# Patient Record
Sex: FEMALE | Race: WHITE | NOT HISPANIC OR LATINO | Employment: FULL TIME | URBAN - METROPOLITAN AREA
[De-identification: names, ages, dates, MRNs, and addresses within clinical notes are randomized per-mention and may not be internally consistent; named-entity substitution may affect disease eponyms.]

---

## 2017-10-16 ENCOUNTER — APPOINTMENT (OUTPATIENT)
Dept: WOMENS IMAGING | Facility: HOSPITAL | Age: 42
End: 2017-10-16

## 2017-10-16 PROCEDURE — 77067 SCR MAMMO BI INCL CAD: CPT | Performed by: RADIOLOGY

## 2017-10-16 PROCEDURE — 77063 BREAST TOMOSYNTHESIS BI: CPT | Performed by: RADIOLOGY

## 2017-11-16 ENCOUNTER — CONSULT (OUTPATIENT)
Dept: GASTROENTEROLOGY | Facility: CLINIC | Age: 42
End: 2017-11-16

## 2017-11-16 VITALS
SYSTOLIC BLOOD PRESSURE: 117 MMHG | BODY MASS INDEX: 35.13 KG/M2 | HEART RATE: 92 BPM | HEIGHT: 66 IN | DIASTOLIC BLOOD PRESSURE: 94 MMHG | OXYGEN SATURATION: 98 % | WEIGHT: 218.6 LBS

## 2017-11-16 DIAGNOSIS — R19.7 DIARRHEA, UNSPECIFIED TYPE: ICD-10-CM

## 2017-11-16 DIAGNOSIS — R19.00 ABDOMINAL SWELLING: ICD-10-CM

## 2017-11-16 DIAGNOSIS — Z86.19 HISTORY OF HELICOBACTER PYLORI INFECTION: ICD-10-CM

## 2017-11-16 DIAGNOSIS — R10.30 LOWER ABDOMINAL PAIN: Primary | ICD-10-CM

## 2017-11-16 DIAGNOSIS — R14.3 FLATULENCE: ICD-10-CM

## 2017-11-16 DIAGNOSIS — R11.0 NAUSEA: ICD-10-CM

## 2017-11-16 PROCEDURE — 99244 OFF/OP CNSLTJ NEW/EST MOD 40: CPT | Performed by: PHYSICIAN ASSISTANT

## 2017-11-16 RX ORDER — INSULIN GLARGINE 100 [IU]/ML
INJECTION, SOLUTION SUBCUTANEOUS DAILY
COMMUNITY
End: 2022-09-16 | Stop reason: SDUPTHER

## 2017-11-16 RX ORDER — CITALOPRAM 10 MG/1
10 TABLET ORAL DAILY
COMMUNITY
End: 2018-08-09

## 2017-11-16 RX ORDER — TRAMADOL HYDROCHLORIDE 50 MG/1
50 TABLET ORAL EVERY 12 HOURS
COMMUNITY
End: 2018-11-26

## 2017-11-16 RX ORDER — MONTELUKAST SODIUM 10 MG/1
10 TABLET ORAL NIGHTLY
COMMUNITY
End: 2018-11-26 | Stop reason: ALTCHOICE

## 2017-11-16 RX ORDER — PAROXETINE 10 MG/1
10 TABLET, FILM COATED ORAL EVERY MORNING
COMMUNITY

## 2017-11-16 NOTE — PROGRESS NOTES
Chief Complaint   Patient presents with   • Nausea   • Diarrhea     Marquita Vernon is a 42 y.o. female who presents to the office today at the request of ALIE Carolina for Nausea and Diarrhea.    HPI    The patient was seen for a GI consult due to nausea and diarrhea.  Patient reports that this has been ongoing for the last two years.  She has had an EGD/colonoscopy in 2013 and was diagnosed with H. Pylori and completed the treatment.  Patient denies vomiting and hematochezia but reports lower abdominal pain, dyspepsia, and increased flatulence.  She also reports melena but states that she takes Pepto Bismol frequently.  Patient has a history of pancreatitis.  Patient takes ultram as needed.  Patient has uncontrolled DM type 2.  Medical, surgical, social, and family histories were reviewed and are listed below.      Review of Systems   Constitutional: Positive for chills, fatigue and fever.   HENT: Positive for congestion and sore throat. Negative for trouble swallowing and voice change.    Eyes: Negative for pain and visual disturbance.   Respiratory: Positive for cough, shortness of breath and wheezing.    Cardiovascular: Negative for chest pain, palpitations and leg swelling.   Gastrointestinal: Positive for abdominal distention, abdominal pain, diarrhea, nausea and vomiting. Negative for anal bleeding, blood in stool, constipation and rectal pain.   Endocrine: Negative for cold intolerance and heat intolerance.   Genitourinary: Negative for difficulty urinating and pelvic pain.   Musculoskeletal: Positive for arthralgias, back pain and myalgias.   Skin: Negative for rash and wound.   Allergic/Immunologic: Positive for environmental allergies. Negative for food allergies.   Neurological: Positive for headaches. Negative for dizziness.   Hematological: Bruises/bleeds easily.   Psychiatric/Behavioral: Positive for sleep disturbance. The patient is nervous/anxious.        ACTIVE PROBLEMS:   Specialty Problems   "   None          PAST MEDICAL HISTORY:  Past Medical History:   Diagnosis Date   • Asthma    • Diabetes mellitus    • Scoliosis        SURGICAL HISTORY:  Past Surgical History:   Procedure Laterality Date   • CHOLECYSTECTOMY     • COLONOSCOPY     • CYST REMOVAL     • ENDOSCOPY     • TUBAL ABDOMINAL LIGATION         FAMILY HISTORY:  Family History   Problem Relation Age of Onset   • Diabetes Other    • Cancer Other    • Lung cancer Other        SOCIAL HISTORY:  Social History   Substance Use Topics   • Smoking status: Never Smoker   • Smokeless tobacco: Never Used   • Alcohol use No       CURRENT MEDICATION:    Current Outpatient Prescriptions:   •  Cholecalciferol (VITAMIN D3) 5000 units capsule capsule, Take 5,000 Units by mouth Daily., Disp: , Rfl:   •  citalopram (CeleXA) 10 MG tablet, Take 10 mg by mouth Daily., Disp: , Rfl:   •  insulin aspart (novoLOG FLEXPEN) 100 UNIT/ML solution pen-injector sc pen, Inject  under the skin 3 (Three) Times a Day With Meals., Disp: , Rfl:   •  insulin glargine (LANTUS) 100 UNIT/ML injection, Inject  under the skin Daily., Disp: , Rfl:   •  montelukast (SINGULAIR) 10 MG tablet, Take 10 mg by mouth Every Night., Disp: , Rfl:   •  PARoxetine (PAXIL) 10 MG tablet, Take 10 mg by mouth Every Morning., Disp: , Rfl:   •  traMADol (ULTRAM) 50 MG tablet, Take 50 mg by mouth Every 12 (Twelve) Hours., Disp: , Rfl:     ALLERGIES:  Ibuprofen and Latex    VISIT VITALS:  /94  Pulse 92  Ht 66\" (167.6 cm)  Wt 218 lb 9.6 oz (99.2 kg)  SpO2 98%  BMI 35.28 kg/m2    PHYSICAL EXAMINATION:  Physical Exam   Constitutional: She is oriented to person, place, and time. She appears well-developed and well-nourished. No distress.   HENT:   Head: Normocephalic and atraumatic.   Right Ear: External ear normal.   Left Ear: External ear normal.   Nose: Nose normal.   Mouth/Throat: Oropharynx is clear and moist. No oropharyngeal exudate.   Eyes: Conjunctivae and EOM are normal. Pupils are equal, " round, and reactive to light. Right eye exhibits no discharge. Left eye exhibits no discharge. No scleral icterus.   Neck: Normal range of motion. Neck supple. No JVD present. No tracheal deviation present. No thyromegaly present.   Cardiovascular: Normal rate, regular rhythm, normal heart sounds and intact distal pulses.  Exam reveals no gallop and no friction rub.    No murmur heard.  Pulmonary/Chest: Effort normal and breath sounds normal. No stridor. No respiratory distress. She has no wheezes. She has no rales. She exhibits no tenderness.   Abdominal: Soft. Bowel sounds are normal. She exhibits no distension and no mass. There is tenderness (RLQ). There is no rebound and no guarding. No hernia.   Musculoskeletal: She exhibits no edema, tenderness or deformity.   Lymphadenopathy:     She has no cervical adenopathy.   Neurological: She is alert and oriented to person, place, and time. She has normal reflexes. She displays normal reflexes. No cranial nerve deficit. She exhibits normal muscle tone. Coordination normal.   Skin: Skin is warm and dry. No rash noted. She is not diaphoretic. No erythema. No pallor.   Psychiatric: She has a normal mood and affect. Her behavior is normal. Judgment and thought content normal.       Assessment/Plan      Diagnosis Plan   1. Lower abdominal pain  H. Pylori Breath Test - Breath, Lung    NM gastric emptying   2. Nausea  H. Pylori Breath Test - Breath, Lung    NM gastric emptying   3. Diarrhea, unspecified type  H. Pylori Breath Test - Breath, Lung    Stool Culture Result    Ova & Parasite Result - Stool, Per Rectum    Clostridium Difficile Toxin - Stool, Per Rectum    XR Abdomen KUB   4. Flatulence  H. Pylori Breath Test - Breath, Lung   5. Abdominal swelling  H. Pylori Breath Test - Breath, Lung    NM gastric emptying   6. History of Helicobacter pylori infection  H. Pylori Breath Test - Breath, Lung     The patient will be scheduled for a gastric emptying test due to GI  symptoms in presence of uncontrolled DM.  She will also have an abdominal xray to rule out constipation.  An H. Pylori test will be ordered due to patient's hx of H. Pylori infection.  Stool studies will be collected to rule out infectious cause for diarrhea.  She voiced understanding and agreement of recommendations.    Return in about 4 weeks (around 12/14/2017) for Recheck.           BRITTANIE Briones

## 2017-11-24 ENCOUNTER — APPOINTMENT (OUTPATIENT)
Dept: NUCLEAR MEDICINE | Facility: HOSPITAL | Age: 42
End: 2017-11-24

## 2017-12-02 ENCOUNTER — HOSPITAL ENCOUNTER (OUTPATIENT)
Dept: GENERAL RADIOLOGY | Facility: HOSPITAL | Age: 42
Discharge: HOME | End: 2017-12-02
Admitting: PHYSICIAN ASSISTANT

## 2017-12-02 ENCOUNTER — HOSPITAL ENCOUNTER (OUTPATIENT)
Dept: NUCLEAR MEDICINE | Facility: HOSPITAL | Age: 42
Discharge: HOME | End: 2017-12-02

## 2017-12-02 DIAGNOSIS — R11.0 NAUSEA: ICD-10-CM

## 2017-12-02 DIAGNOSIS — R10.30 LOWER ABDOMINAL PAIN: ICD-10-CM

## 2017-12-02 DIAGNOSIS — R19.00 ABDOMINAL SWELLING: ICD-10-CM

## 2017-12-02 PROCEDURE — 74000 HC ABDOMEN KUB: CPT

## 2017-12-02 PROCEDURE — A9541 TC99M SULFUR COLLOID: HCPCS | Performed by: PHYSICIAN ASSISTANT

## 2017-12-02 PROCEDURE — 78264 GASTRIC EMPTYING IMG STUDY: CPT | Performed by: RADIOLOGY

## 2017-12-02 PROCEDURE — 0 TECHNETIUM SULFUR COLLOID: Performed by: PHYSICIAN ASSISTANT

## 2017-12-02 PROCEDURE — 78264 GASTRIC EMPTYING IMG STUDY: CPT

## 2017-12-02 PROCEDURE — 74000 XR ABDOMEN KUB: CPT | Performed by: RADIOLOGY

## 2017-12-02 RX ADMIN — TECHNETIUM TC 99M SULFUR COLLOID 1 DOSE: KIT at 12:37

## 2017-12-06 ENCOUNTER — TELEPHONE (OUTPATIENT)
Dept: GASTROENTEROLOGY | Facility: CLINIC | Age: 42
End: 2017-12-06

## 2017-12-06 NOTE — TELEPHONE ENCOUNTER
Please call patient and let her know that her gastric emptying scan showed delayed gastric emptying.  Please schedule her to come in to the office to discuss this unless she has an appointment already within the next two weeks.  Thank you.

## 2017-12-19 ENCOUNTER — OFFICE VISIT (OUTPATIENT)
Dept: GASTROENTEROLOGY | Facility: CLINIC | Age: 42
End: 2017-12-19

## 2017-12-19 VITALS
BODY MASS INDEX: 34.84 KG/M2 | HEIGHT: 67 IN | WEIGHT: 222 LBS | HEART RATE: 93 BPM | SYSTOLIC BLOOD PRESSURE: 131 MMHG | OXYGEN SATURATION: 99 % | DIASTOLIC BLOOD PRESSURE: 100 MMHG

## 2017-12-19 DIAGNOSIS — R19.7 DIARRHEA, UNSPECIFIED TYPE: Primary | ICD-10-CM

## 2017-12-19 DIAGNOSIS — R11.0 NAUSEA: ICD-10-CM

## 2017-12-19 PROCEDURE — 99213 OFFICE O/P EST LOW 20 MIN: CPT | Performed by: PHYSICIAN ASSISTANT

## 2017-12-19 RX ORDER — AMOXICILLIN 875 MG/1
875 TABLET, COATED ORAL 2 TIMES DAILY
COMMUNITY
End: 2018-01-31

## 2017-12-19 NOTE — PROGRESS NOTES
Chief Complaint   Patient presents with   • Nausea       Marquita Vernon is a 42 y.o. female who presents to the office today for follow up appointment regarding Nausea.    HPI  She was having diarrhea and nausea. Noticed change since starting antibiotics for sinuses. She has noticed an improvement in her bowel habits since starting the antibiotic. She was afraid to eat due to symptoms and diabetes. She is having 2 stools daily which are more formed now. Nausea has improved as well.     Gastric emptying abnormal in past with noted delayed emptying. Abdominal film was read as normal.    Review of Systems   Constitutional: Positive for chills, fatigue and fever.   HENT: Positive for congestion and sore throat. Negative for trouble swallowing and voice change.    Eyes: Negative for pain and visual disturbance.   Respiratory: Positive for cough, shortness of breath and wheezing.    Cardiovascular: Negative for chest pain, palpitations and leg swelling.   Gastrointestinal: Positive for diarrhea. Negative for abdominal distention, abdominal pain, anal bleeding, blood in stool, constipation, nausea, rectal pain and vomiting.   Endocrine: Negative for cold intolerance and heat intolerance.   Genitourinary: Negative for difficulty urinating and pelvic pain.   Musculoskeletal: Positive for arthralgias, back pain and myalgias.   Skin: Negative for rash and wound.   Allergic/Immunologic: Positive for environmental allergies. Negative for food allergies.   Neurological: Positive for headaches. Negative for dizziness.   Hematological: Bruises/bleeds easily.   Psychiatric/Behavioral: Positive for sleep disturbance. The patient is nervous/anxious.        Past Medical History:   Diagnosis Date   • Asthma    • Diabetes mellitus    • Scoliosis        Past Surgical History:   Procedure Laterality Date   • CHOLECYSTECTOMY     • COLONOSCOPY     • CYST REMOVAL     • ENDOSCOPY     • TUBAL ABDOMINAL LIGATION         Family History   Problem  "Relation Age of Onset   • Diabetes Other    • Cancer Other    • Lung cancer Other        Social History   Substance Use Topics   • Smoking status: Never Smoker   • Smokeless tobacco: Never Used   • Alcohol use No       CURRENT MEDICATION:  •  amoxicillin (AMOXIL) 875 MG tablet, Take 875 mg by mouth 2 (Two) Times a Day., Disp: , Rfl:   •  Cholecalciferol (VITAMIN D3) 5000 units capsule capsule, Take 5,000 Units by mouth Daily., Disp: , Rfl:   •  citalopram (CeleXA) 10 MG tablet, Take 10 mg by mouth Daily., Disp: , Rfl:   •  insulin aspart (novoLOG FLEXPEN) 100 UNIT/ML solution pen-injector sc pen, Inject  under the skin 3 (Three) Times a Day With Meals., Disp: , Rfl:   •  insulin glargine (LANTUS) 100 UNIT/ML injection, Inject  under the skin Daily., Disp: , Rfl:   •  montelukast (SINGULAIR) 10 MG tablet, Take 10 mg by mouth Every Night., Disp: , Rfl:   •  PARoxetine (PAXIL) 10 MG tablet, Take 10 mg by mouth Every Morning., Disp: , Rfl:   •  traMADol (ULTRAM) 50 MG tablet, Take 50 mg by mouth Every 12 (Twelve) Hours., Disp: , Rfl:     ALLERGIES:  Ibuprofen and Latex    VISIT VITALS:  /100  Pulse 93  Ht 170.2 cm (67\")  Wt 101 kg (222 lb)  SpO2 99%  BMI 34.77 kg/m2    Physical Exam   Constitutional: She is oriented to person, place, and time. She appears well-developed and well-nourished. No distress.   HENT:   Head: Normocephalic and atraumatic.   Right Ear: External ear normal.   Left Ear: External ear normal.   Nose: Nose normal.   Mouth/Throat: Oropharynx is clear and moist.   Eyes: Conjunctivae and EOM are normal. Right eye exhibits no discharge. Left eye exhibits no discharge. No scleral icterus.   Neck: Normal range of motion. Neck supple.   Cardiovascular: Normal rate, regular rhythm and normal heart sounds.  Exam reveals no gallop and no friction rub.    No murmur heard.  Pulmonary/Chest: Effort normal and breath sounds normal. No respiratory distress. She has no wheezes. She has no rales. She " exhibits no tenderness.   Abdominal: Soft. Normal appearance and bowel sounds are normal. She exhibits no distension, no ascites and no mass. There is no tenderness. There is no rigidity and no guarding. No hernia.   Musculoskeletal: Normal range of motion. She exhibits no edema or deformity.   Neurological: She is alert and oriented to person, place, and time. She exhibits normal muscle tone. Coordination normal.   Skin: Skin is warm and dry. No rash noted. No erythema. No pallor.   Psychiatric: She has a normal mood and affect. Her behavior is normal. Judgment and thought content normal.   Nursing note and vitals reviewed.      Assessment/Plan     1. Diarrhea, unspecified type    2. Nausea      Her GI complaints have improved. She has been feeling much better. I have asked her to monitor symptoms for recurrence and call our office if needed. She will continue with small portions at meal times and not overeat due to gastroparesis.       Return if symptoms worsen or fail to improve.      Electronically signed 12/22/2017 2:41 PM  Dianna Walters PA-C, [facility]

## 2017-12-29 ENCOUNTER — TELEPHONE (OUTPATIENT)
Dept: GASTROENTEROLOGY | Facility: CLINIC | Age: 42
End: 2017-12-29

## 2018-01-03 NOTE — TELEPHONE ENCOUNTER
If she has the flu, her symptoms are likely related to that. She should schedule appt for a few weeks out after flu has resolved to discuss complaints.

## 2018-01-03 NOTE — TELEPHONE ENCOUNTER
Patient stated that after she stopped taking antibiotics, diarrhea and stomach cramps came back. She currently has the flu. Is there anything that you could call in for her so she doesn't have to come in? Thank you.

## 2018-01-04 NOTE — TELEPHONE ENCOUNTER
Spoke with patient and let her know that her symptoms are likely related to the flu. I made her an appointment on 01/31/2018 to come in and discuss her complaints.

## 2018-01-31 ENCOUNTER — OFFICE VISIT (OUTPATIENT)
Dept: GASTROENTEROLOGY | Facility: CLINIC | Age: 43
End: 2018-01-31

## 2018-01-31 ENCOUNTER — LAB (OUTPATIENT)
Dept: LAB | Facility: HOSPITAL | Age: 43
End: 2018-01-31

## 2018-01-31 VITALS
SYSTOLIC BLOOD PRESSURE: 112 MMHG | BODY MASS INDEX: 35.75 KG/M2 | OXYGEN SATURATION: 98 % | HEIGHT: 67 IN | WEIGHT: 227.8 LBS | HEART RATE: 92 BPM | DIASTOLIC BLOOD PRESSURE: 82 MMHG

## 2018-01-31 DIAGNOSIS — R10.9 ABDOMINAL CRAMPING: ICD-10-CM

## 2018-01-31 DIAGNOSIS — A09 TRAVELER'S DIARRHEA: ICD-10-CM

## 2018-01-31 DIAGNOSIS — R15.2 FECAL URGENCY: ICD-10-CM

## 2018-01-31 DIAGNOSIS — R19.7 DIARRHEA, UNSPECIFIED TYPE: ICD-10-CM

## 2018-01-31 DIAGNOSIS — R19.7 DIARRHEA, UNSPECIFIED TYPE: Primary | ICD-10-CM

## 2018-01-31 LAB
027 TOXIN: NORMAL
ALBUMIN SERPL-MCNC: 4.4 G/DL (ref 3.5–5)
ALBUMIN/GLOB SERPL: 1.5 G/DL (ref 1.5–2.5)
ALP SERPL-CCNC: 86 U/L (ref 35–104)
ALT SERPL W P-5'-P-CCNC: 22 U/L (ref 10–36)
AMYLASE SERPL-CCNC: 41 U/L (ref 28–100)
ANION GAP SERPL CALCULATED.3IONS-SCNC: 6.9 MMOL/L (ref 3.6–11.2)
AST SERPL-CCNC: 20 U/L (ref 10–30)
BASOPHILS # BLD AUTO: 0.05 10*3/MM3 (ref 0–0.3)
BASOPHILS NFR BLD AUTO: 0.6 % (ref 0–2)
BILIRUB SERPL-MCNC: 0.4 MG/DL (ref 0.2–1.8)
BUN BLD-MCNC: 18 MG/DL (ref 7–21)
BUN/CREAT SERPL: 20.9 (ref 7–25)
C DIFF TOX GENS STL QL NAA+PROBE: NEGATIVE
CALCIUM SPEC-SCNC: 9.2 MG/DL (ref 7.7–10)
CHLORIDE SERPL-SCNC: 107 MMOL/L (ref 99–112)
CO2 SERPL-SCNC: 24.1 MMOL/L (ref 24.3–31.9)
CREAT BLD-MCNC: 0.86 MG/DL (ref 0.43–1.29)
CRP SERPL-MCNC: <0.5 MG/DL (ref 0–0.99)
DEPRECATED RDW RBC AUTO: 41.8 FL (ref 37–54)
EOSINOPHIL # BLD AUTO: 0.36 10*3/MM3 (ref 0–0.7)
EOSINOPHIL NFR BLD AUTO: 4.2 % (ref 0–5)
ERYTHROCYTE [DISTWIDTH] IN BLOOD BY AUTOMATED COUNT: 13 % (ref 11.5–14.5)
GFR SERPL CREATININE-BSD FRML MDRD: 72 ML/MIN/1.73
GLOBULIN UR ELPH-MCNC: 2.9 GM/DL
GLUCOSE BLD-MCNC: 277 MG/DL (ref 70–110)
HCT VFR BLD AUTO: 41.6 % (ref 37–47)
HGB BLD-MCNC: 13.6 G/DL (ref 12–16)
IMM GRANULOCYTES # BLD: 0.02 10*3/MM3 (ref 0–0.03)
IMM GRANULOCYTES NFR BLD: 0.2 % (ref 0–0.5)
LACTOFERRIN STL QL LA: NEGATIVE
LIPASE SERPL-CCNC: 41 U/L (ref 13–60)
LYMPHOCYTES # BLD AUTO: 2.21 10*3/MM3 (ref 1–3)
LYMPHOCYTES NFR BLD AUTO: 25.6 % (ref 21–51)
MCH RBC QN AUTO: 29.5 PG (ref 27–33)
MCHC RBC AUTO-ENTMCNC: 32.7 G/DL (ref 33–37)
MCV RBC AUTO: 90.2 FL (ref 80–94)
MONOCYTES # BLD AUTO: 0.61 10*3/MM3 (ref 0.1–0.9)
MONOCYTES NFR BLD AUTO: 7.1 % (ref 0–10)
NEUTROPHILS # BLD AUTO: 5.39 10*3/MM3 (ref 1.4–6.5)
NEUTROPHILS NFR BLD AUTO: 62.3 % (ref 30–70)
OSMOLALITY SERPL CALC.SUM OF ELEC: 287.5 MOSM/KG (ref 273–305)
PLATELET # BLD AUTO: 253 10*3/MM3 (ref 130–400)
PMV BLD AUTO: 12.7 FL (ref 6–10)
POTASSIUM BLD-SCNC: 4.9 MMOL/L (ref 3.5–5.3)
PROT SERPL-MCNC: 7.3 G/DL (ref 6–8)
RBC # BLD AUTO: 4.61 10*6/MM3 (ref 4.2–5.4)
SODIUM BLD-SCNC: 138 MMOL/L (ref 135–153)
WBC NRBC COR # BLD: 8.64 10*3/MM3 (ref 4.5–12.5)

## 2018-01-31 PROCEDURE — 87045 FECES CULTURE AEROBIC BACT: CPT

## 2018-01-31 PROCEDURE — 36415 COLL VENOUS BLD VENIPUNCTURE: CPT

## 2018-01-31 PROCEDURE — 80053 COMPREHEN METABOLIC PANEL: CPT

## 2018-01-31 PROCEDURE — 85025 COMPLETE CBC W/AUTO DIFF WBC: CPT

## 2018-01-31 PROCEDURE — 87046 STOOL CULTR AEROBIC BACT EA: CPT

## 2018-01-31 PROCEDURE — 87328 CRYPTOSPORIDIUM AG IA: CPT

## 2018-01-31 PROCEDURE — 86140 C-REACTIVE PROTEIN: CPT

## 2018-01-31 PROCEDURE — 87177 OVA AND PARASITES SMEARS: CPT

## 2018-01-31 PROCEDURE — 82150 ASSAY OF AMYLASE: CPT

## 2018-01-31 PROCEDURE — 87493 C DIFF AMPLIFIED PROBE: CPT

## 2018-01-31 PROCEDURE — 82656 EL-1 FECAL QUAL/SEMIQ: CPT

## 2018-01-31 PROCEDURE — 87209 SMEAR COMPLEX STAIN: CPT

## 2018-01-31 PROCEDURE — 83013 H PYLORI (C-13) BREATH: CPT

## 2018-01-31 PROCEDURE — 83631 LACTOFERRIN FECAL (QUANT): CPT

## 2018-01-31 PROCEDURE — 99214 OFFICE O/P EST MOD 30 MIN: CPT | Performed by: PHYSICIAN ASSISTANT

## 2018-01-31 PROCEDURE — 87329 GIARDIA AG IA: CPT

## 2018-01-31 PROCEDURE — 83690 ASSAY OF LIPASE: CPT

## 2018-01-31 RX ORDER — NITAZOXANIDE 500 MG/1
500 TABLET ORAL 2 TIMES DAILY WITH MEALS
Qty: 20 TABLET | Refills: 0 | Status: SHIPPED | OUTPATIENT
Start: 2018-01-31 | End: 2018-02-09

## 2018-01-31 NOTE — PROGRESS NOTES
: 1975    Chief Complaint   Patient presents with   • Diarrhea       Marquita Vernon is a 42 y.o. female who presents to the office today as a follow up appointment regarding Diarrhea.    History of Present Illness:  Since the beginning of 2018, she developed severe abdominal gas pains and it has now gotten to the point that it is keeping her up at night. Abdominal pain is generalized and described as cramping, severe and intermittent. It will last for long periods of time  She has diarrhea up to 8 times per day. Fecal urgency is present without incontinence. She has been taking probiotics which seem to help some. Denies nausea currently. Diarrhea has been intermittent for the past 2 years, she did have improvement in symptoms after taking an antibiotic for a sinus infection around the time of her last office visit.     EGD/colonoscopy was performed in  and she was diagnosed with H. Pylori and completed the treatment at that time. There is also history of pancreatitis.  Takes ultram as needed and has uncontrolled DM type 2.      Abdominal film 2017:  IMPRESSION:  Nonobstructive bowel gas pattern.    Review of Systems   Constitutional: Positive for chills, fatigue and fever.   HENT: Positive for congestion and sore throat. Negative for trouble swallowing and voice change.    Eyes: Negative for pain and visual disturbance.   Respiratory: Positive for cough, shortness of breath and wheezing.    Cardiovascular: Negative for chest pain, palpitations and leg swelling.   Gastrointestinal: Positive for diarrhea. Negative for abdominal distention, abdominal pain, anal bleeding, blood in stool, constipation, nausea, rectal pain and vomiting.   Endocrine: Negative for cold intolerance and heat intolerance.   Genitourinary: Negative for difficulty urinating and pelvic pain.   Musculoskeletal: Positive for arthralgias, back pain and myalgias.   Skin: Negative for rash and wound.   Allergic/Immunologic: Positive  "for environmental allergies. Negative for food allergies.   Neurological: Positive for headaches. Negative for dizziness.   Hematological: Bruises/bleeds easily.   Psychiatric/Behavioral: Positive for sleep disturbance. The patient is nervous/anxious.        Past Medical History:   Diagnosis Date   • Asthma    • Diabetes mellitus    • Scoliosis        Past Surgical History:   Procedure Laterality Date   • CHOLECYSTECTOMY     • COLONOSCOPY     • CYST REMOVAL     • ENDOSCOPY     • TUBAL ABDOMINAL LIGATION         Family History   Problem Relation Age of Onset   • Diabetes Other    • Cancer Other    • Lung cancer Other        Social History     Social History   • Marital status: Unknown     Spouse name: N/A   • Number of children: N/A   • Years of education: N/A     Social History Main Topics   • Smoking status: Never Smoker   • Smokeless tobacco: Never Used   • Alcohol use No   • Drug use: Not on file   • Sexual activity: Not on file     Other Topics Concern   • Not on file     Social History Narrative   • No narrative on file       Current Outpatient Prescriptions:   •  Cholecalciferol (VITAMIN D3) 5000 units capsule capsule, Take 5,000 Units by mouth Daily., Disp: , Rfl:   •  citalopram (CeleXA) 10 MG tablet, Take 10 mg by mouth Daily., Disp: , Rfl:   •  insulin aspart (novoLOG FLEXPEN) 100 UNIT/ML solution pen-injector sc pen, Inject  under the skin 3 (Three) Times a Day With Meals., Disp: , Rfl:   •  insulin glargine (LANTUS) 100 UNIT/ML injection, Inject  under the skin Daily., Disp: , Rfl:   •  montelukast (SINGULAIR) 10 MG tablet, Take 10 mg by mouth Every Night., Disp: , Rfl:   •  PARoxetine (PAXIL) 10 MG tablet, Take 10 mg by mouth Every Morning., Disp: , Rfl:   •  traMADol (ULTRAM) 50 MG tablet, Take 50 mg by mouth Every 12 (Twelve) Hours., Disp: , Rfl:     Allergies:   Ibuprofen and Latex    Vitals:  /82  Pulse 92  Ht 170.2 cm (67\")  Wt 103 kg (227 lb 12.8 oz)  SpO2 98%  BMI 35.68 " kg/m2    Physical Exam   Constitutional: She is oriented to person, place, and time. She appears well-developed and well-nourished. No distress.   HENT:   Head: Normocephalic and atraumatic.   Right Ear: External ear normal.   Left Ear: External ear normal.   Nose: Nose normal.   Mouth/Throat: Oropharynx is clear and moist.   Eyes: Conjunctivae and EOM are normal. Right eye exhibits no discharge. Left eye exhibits no discharge. No scleral icterus.   Neck: Normal range of motion. Neck supple.   Cardiovascular: Normal rate, regular rhythm and normal heart sounds.  Exam reveals no gallop and no friction rub.    No murmur heard.  Pulmonary/Chest: Effort normal and breath sounds normal. No respiratory distress. She has no wheezes. She has no rales. She exhibits no tenderness.   Abdominal: Soft. Normal appearance and bowel sounds are normal. She exhibits no distension, no ascites and no mass. There is tenderness (generalized, mild). There is no rigidity and no guarding. No hernia.   Musculoskeletal: Normal range of motion. She exhibits no edema or deformity.   Neurological: She is alert and oriented to person, place, and time. She exhibits normal muscle tone. Coordination normal.   Skin: Skin is warm and dry. No rash noted. No erythema. No pallor.   Psychiatric: She has a normal mood and affect. Her behavior is normal. Judgment and thought content normal.   Nursing note and vitals reviewed.      Assessment/Plan:  1. Diarrhea, unspecified type    2. Abdominal cramping    3. Fecal urgency      Orders Placed This Encounter   Procedures   • Clostridium Difficile Toxin, PCR - Stool, Per Rectum   • Giardia / Cryptosporidium Screen - Stool, Per Rectum   • Stool Culture - Stool, Per Rectum   • Ova & Parasite Examination - Stool, Per Rectum   • H. Pylori Breath Test - , Lung   • Fecal Lactoferrin - Stool, Per Rectum   • Pancreatic Elastase, Fecal - Stool, Per Rectum   • Amylase   • Comprehensive Metabolic Panel   • C-reactive  Protein   • Lipase   • CBC & Differential       New Medications Ordered This Visit   Medications   • nitazoxanide (ALINIA) 500 MG tablet     Sig: Take 1 tablet by mouth 2 (Two) Times a Day With Meals for 10 days.     Dispense:  20 tablet     Refill:  0     She will have aforementioned tests for further evaluation of her complaints. I have recommended that after stool testing, she go ahead and start treatment with Alinia for diarrhea. Previous abdominal film was reviewed and completely normal. She can also continue Probiotics daily plus Simethicone (Gas-x OTC) as needed.         Return in about 3 weeks (around 2/21/2018) for recheck diarrhea.      Electronically signed 1/31/2018 11:02 AM  Dianna Walters PA-C, [facility]

## 2018-02-02 LAB
BACTERIA SPEC AEROBE CULT: NORMAL
CRYPTOSP AG STL QL IA: NEGATIVE
G LAMBLIA AG STL QL IA: NEGATIVE

## 2018-02-03 LAB
O+P SPEC MICRO: NORMAL
OVA + PARASITE RESULT 1: NORMAL
UREA BREATH TEST QL: NORMAL
UREA BREATH TEST QL: POSITIVE

## 2018-02-05 LAB — ELASTASE PANC STL-MCNT: >500 UG ELAST./G

## 2018-02-06 ENCOUNTER — TELEPHONE (OUTPATIENT)
Dept: GASTROENTEROLOGY | Facility: CLINIC | Age: 43
End: 2018-02-06

## 2018-02-06 NOTE — TELEPHONE ENCOUNTER
Patient states Alinia 500mg bid  Is not covered on [employer]. She wants you to try and p-a

## 2018-02-07 ENCOUNTER — TELEPHONE (OUTPATIENT)
Dept: GASTROENTEROLOGY | Facility: CLINIC | Age: 43
End: 2018-02-07

## 2018-02-07 ENCOUNTER — DOCUMENTATION (OUTPATIENT)
Dept: GASTROENTEROLOGY | Facility: CLINIC | Age: 43
End: 2018-02-07

## 2018-02-07 DIAGNOSIS — R19.7 DIARRHEA, UNSPECIFIED TYPE: Primary | ICD-10-CM

## 2018-02-09 RX ORDER — METRONIDAZOLE 500 MG/1
500 TABLET ORAL 3 TIMES DAILY
Qty: 30 TABLET | Refills: 0 | Status: SHIPPED | OUTPATIENT
Start: 2018-02-09 | End: 2018-02-22

## 2018-02-09 NOTE — TELEPHONE ENCOUNTER
Spoke with patient and let her know her insurance denied Alinia. I let her know that she has Flagyl at her pharmacy and told her to not drink alcohol, use mouth wash or cough syrup while taking the medicine. She voiced understanding.

## 2018-02-09 NOTE — TELEPHONE ENCOUNTER
I will send Flagyl instead. Please let her know and she should not drink alcohol, use mouth wash or cough syrup while taking this medication. Stop probiotic while taking it then resume after therapy.

## 2018-02-22 ENCOUNTER — OFFICE VISIT (OUTPATIENT)
Dept: GASTROENTEROLOGY | Facility: CLINIC | Age: 43
End: 2018-02-22

## 2018-02-22 VITALS
OXYGEN SATURATION: 99 % | HEART RATE: 99 BPM | SYSTOLIC BLOOD PRESSURE: 115 MMHG | BODY MASS INDEX: 36.26 KG/M2 | WEIGHT: 231 LBS | DIASTOLIC BLOOD PRESSURE: 88 MMHG | HEIGHT: 67 IN

## 2018-02-22 DIAGNOSIS — A04.8 H. PYLORI INFECTION: Primary | ICD-10-CM

## 2018-02-22 DIAGNOSIS — R10.84 ABDOMINAL PAIN, GENERALIZED: ICD-10-CM

## 2018-02-22 PROCEDURE — 99214 OFFICE O/P EST MOD 30 MIN: CPT | Performed by: PHYSICIAN ASSISTANT

## 2018-02-22 RX ORDER — OMEPRAZOLE 20 MG/1
20 CAPSULE, DELAYED RELEASE ORAL AS NEEDED
COMMUNITY
End: 2018-02-22 | Stop reason: SDUPTHER

## 2018-02-22 RX ORDER — OMEPRAZOLE 20 MG/1
20 CAPSULE, DELAYED RELEASE ORAL
Qty: 20 CAPSULE | Refills: 0 | Status: SHIPPED | OUTPATIENT
Start: 2018-02-22

## 2018-04-19 ENCOUNTER — RESULTS ENCOUNTER (OUTPATIENT)
Dept: GASTROENTEROLOGY | Facility: CLINIC | Age: 43
End: 2018-04-19

## 2018-04-19 DIAGNOSIS — A04.8 H. PYLORI INFECTION: ICD-10-CM

## 2018-08-09 ENCOUNTER — APPOINTMENT (OUTPATIENT)
Dept: GENERAL RADIOLOGY | Facility: HOSPITAL | Age: 43
End: 2018-08-09

## 2018-08-09 ENCOUNTER — HOSPITAL ENCOUNTER (EMERGENCY)
Facility: HOSPITAL | Age: 43
Discharge: HOME | End: 2018-08-09
Attending: EMERGENCY MEDICINE | Admitting: EMERGENCY MEDICINE

## 2018-08-09 ENCOUNTER — APPOINTMENT (OUTPATIENT)
Dept: CT IMAGING | Facility: HOSPITAL | Age: 43
End: 2018-08-09

## 2018-08-09 VITALS
OXYGEN SATURATION: 100 % | BODY MASS INDEX: 37.28 KG/M2 | SYSTOLIC BLOOD PRESSURE: 109 MMHG | WEIGHT: 232 LBS | DIASTOLIC BLOOD PRESSURE: 66 MMHG | RESPIRATION RATE: 18 BRPM | TEMPERATURE: 98.6 F | HEART RATE: 72 BPM | HEIGHT: 66 IN

## 2018-08-09 DIAGNOSIS — R10.31 RIGHT LOWER QUADRANT ABDOMINAL PAIN: ICD-10-CM

## 2018-08-09 DIAGNOSIS — E11.65 TYPE 2 DIABETES MELLITUS WITH HYPERGLYCEMIA, WITH LONG-TERM CURRENT USE OF INSULIN (HCC): Primary | ICD-10-CM

## 2018-08-09 DIAGNOSIS — Z79.4 TYPE 2 DIABETES MELLITUS WITH HYPERGLYCEMIA, WITH LONG-TERM CURRENT USE OF INSULIN (HCC): Primary | ICD-10-CM

## 2018-08-09 LAB
ACETONE BLD QL: ABNORMAL
ALBUMIN SERPL-MCNC: 4.1 G/DL (ref 3.5–5)
ALBUMIN/GLOB SERPL: 1.4 G/DL (ref 1.5–2.5)
ALP SERPL-CCNC: 76 U/L (ref 35–104)
ALT SERPL W P-5'-P-CCNC: 25 U/L (ref 10–36)
AMYLASE SERPL-CCNC: 18 U/L (ref 28–100)
ANION GAP SERPL CALCULATED.3IONS-SCNC: 7.1 MMOL/L (ref 3.6–11.2)
AST SERPL-CCNC: 53 U/L (ref 10–30)
B-HCG UR QL: NEGATIVE
BASOPHILS # BLD AUTO: 0.03 10*3/MM3 (ref 0–0.3)
BASOPHILS NFR BLD AUTO: 0.4 % (ref 0–2)
BILIRUB SERPL-MCNC: 0.7 MG/DL (ref 0.2–1.8)
BILIRUB UR QL STRIP: NEGATIVE
BUN BLD-MCNC: 14 MG/DL (ref 7–21)
BUN/CREAT SERPL: 14.4 (ref 7–25)
CALCIUM SPEC-SCNC: 9.3 MG/DL (ref 7.7–10)
CHLORIDE SERPL-SCNC: 102 MMOL/L (ref 99–112)
CLARITY UR: CLEAR
CO2 SERPL-SCNC: 22.9 MMOL/L (ref 24.3–31.9)
COLOR UR: YELLOW
CREAT BLD-MCNC: 0.97 MG/DL (ref 0.43–1.29)
CRP SERPL-MCNC: 1.42 MG/DL (ref 0–0.99)
DEPRECATED RDW RBC AUTO: 39.1 FL (ref 37–54)
EOSINOPHIL # BLD AUTO: 0.25 10*3/MM3 (ref 0–0.7)
EOSINOPHIL NFR BLD AUTO: 3.7 % (ref 0–5)
ERYTHROCYTE [DISTWIDTH] IN BLOOD BY AUTOMATED COUNT: 12.5 % (ref 11.5–14.5)
GFR SERPL CREATININE-BSD FRML MDRD: 63 ML/MIN/1.73
GLOBULIN UR ELPH-MCNC: 2.9 GM/DL
GLUCOSE BLD-MCNC: 425 MG/DL (ref 70–110)
GLUCOSE BLDC GLUCOMTR-MCNC: 325 MG/DL (ref 70–130)
GLUCOSE BLDC GLUCOMTR-MCNC: 403 MG/DL (ref 70–130)
GLUCOSE UR STRIP-MCNC: ABNORMAL MG/DL
HCT VFR BLD AUTO: 38.3 % (ref 37–47)
HGB BLD-MCNC: 12.8 G/DL (ref 12–16)
HGB UR QL STRIP.AUTO: NEGATIVE
IMM GRANULOCYTES # BLD: 0.02 10*3/MM3 (ref 0–0.03)
IMM GRANULOCYTES NFR BLD: 0.3 % (ref 0–0.5)
KETONES UR QL STRIP: ABNORMAL
LEUKOCYTE ESTERASE UR QL STRIP.AUTO: NEGATIVE
LIPASE SERPL-CCNC: 34 U/L (ref 13–60)
LYMPHOCYTES # BLD AUTO: 1.61 10*3/MM3 (ref 1–3)
LYMPHOCYTES NFR BLD AUTO: 23.8 % (ref 21–51)
MCH RBC QN AUTO: 29.4 PG (ref 27–33)
MCHC RBC AUTO-ENTMCNC: 33.4 G/DL (ref 33–37)
MCV RBC AUTO: 88 FL (ref 80–94)
MONOCYTES # BLD AUTO: 0.37 10*3/MM3 (ref 0.1–0.9)
MONOCYTES NFR BLD AUTO: 5.5 % (ref 0–10)
NEUTROPHILS # BLD AUTO: 4.48 10*3/MM3 (ref 1.4–6.5)
NEUTROPHILS NFR BLD AUTO: 66.3 % (ref 30–70)
NITRITE UR QL STRIP: NEGATIVE
OSMOLALITY SERPL CALC.SUM OF ELEC: 283.1 MOSM/KG (ref 273–305)
PH UR STRIP.AUTO: <=5 [PH] (ref 5–8)
PLATELET # BLD AUTO: 183 10*3/MM3 (ref 130–400)
PMV BLD AUTO: 12.9 FL (ref 6–10)
POTASSIUM BLD-SCNC: 4.4 MMOL/L (ref 3.5–5.3)
POTASSIUM BLD-SCNC: 6.1 MMOL/L (ref 3.5–5.3)
PROT SERPL-MCNC: 7 G/DL (ref 6–8)
PROT UR QL STRIP: NEGATIVE
RBC # BLD AUTO: 4.35 10*6/MM3 (ref 4.2–5.4)
SODIUM BLD-SCNC: 132 MMOL/L (ref 135–153)
SP GR UR STRIP: >1.03 (ref 1–1.03)
TROPONIN I SERPL-MCNC: <0.006 NG/ML
UROBILINOGEN UR QL STRIP: ABNORMAL
WBC NRBC COR # BLD: 6.76 10*3/MM3 (ref 4.5–12.5)

## 2018-08-09 PROCEDURE — 86140 C-REACTIVE PROTEIN: CPT | Performed by: PHYSICIAN ASSISTANT

## 2018-08-09 PROCEDURE — 99285 EMERGENCY DEPT VISIT HI MDM: CPT

## 2018-08-09 PROCEDURE — 74022 RADEX COMPL AQT ABD SERIES: CPT

## 2018-08-09 PROCEDURE — 82150 ASSAY OF AMYLASE: CPT | Performed by: PHYSICIAN ASSISTANT

## 2018-08-09 PROCEDURE — 25010000002 IOPAMIDOL 61 % SOLUTION: Performed by: EMERGENCY MEDICINE

## 2018-08-09 PROCEDURE — 83690 ASSAY OF LIPASE: CPT | Performed by: PHYSICIAN ASSISTANT

## 2018-08-09 PROCEDURE — 25010000002 ONDANSETRON PER 1 MG: Performed by: PHYSICIAN ASSISTANT

## 2018-08-09 PROCEDURE — 84132 ASSAY OF SERUM POTASSIUM: CPT | Performed by: PHYSICIAN ASSISTANT

## 2018-08-09 PROCEDURE — 74177 CT ABD & PELVIS W/CONTRAST: CPT | Performed by: RADIOLOGY

## 2018-08-09 PROCEDURE — 93005 ELECTROCARDIOGRAM TRACING: CPT | Performed by: PHYSICIAN ASSISTANT

## 2018-08-09 PROCEDURE — 85025 COMPLETE CBC W/AUTO DIFF WBC: CPT | Performed by: PHYSICIAN ASSISTANT

## 2018-08-09 PROCEDURE — 84484 ASSAY OF TROPONIN QUANT: CPT | Performed by: PHYSICIAN ASSISTANT

## 2018-08-09 PROCEDURE — 96374 THER/PROPH/DIAG INJ IV PUSH: CPT

## 2018-08-09 PROCEDURE — 80053 COMPREHEN METABOLIC PANEL: CPT | Performed by: PHYSICIAN ASSISTANT

## 2018-08-09 PROCEDURE — 74022 RADEX COMPL AQT ABD SERIES: CPT | Performed by: RADIOLOGY

## 2018-08-09 PROCEDURE — 82962 GLUCOSE BLOOD TEST: CPT

## 2018-08-09 PROCEDURE — 63710000001 INSULIN REGULAR HUMAN PER 5 UNITS: Performed by: PHYSICIAN ASSISTANT

## 2018-08-09 PROCEDURE — 96361 HYDRATE IV INFUSION ADD-ON: CPT

## 2018-08-09 PROCEDURE — 25010000002 MORPHINE PER 10 MG: Performed by: EMERGENCY MEDICINE

## 2018-08-09 PROCEDURE — 81025 URINE PREGNANCY TEST: CPT | Performed by: PHYSICIAN ASSISTANT

## 2018-08-09 PROCEDURE — 93010 ELECTROCARDIOGRAM REPORT: CPT | Performed by: INTERNAL MEDICINE

## 2018-08-09 PROCEDURE — 96375 TX/PRO/DX INJ NEW DRUG ADDON: CPT

## 2018-08-09 PROCEDURE — 74177 CT ABD & PELVIS W/CONTRAST: CPT

## 2018-08-09 PROCEDURE — 82009 KETONE BODYS QUAL: CPT | Performed by: PHYSICIAN ASSISTANT

## 2018-08-09 PROCEDURE — 81003 URINALYSIS AUTO W/O SCOPE: CPT | Performed by: PHYSICIAN ASSISTANT

## 2018-08-09 RX ORDER — SODIUM CHLORIDE 0.9 % (FLUSH) 0.9 %
10 SYRINGE (ML) INJECTION AS NEEDED
Status: DISCONTINUED | OUTPATIENT
Start: 2018-08-09 | End: 2018-08-09 | Stop reason: HOSPADM

## 2018-08-09 RX ORDER — ONDANSETRON 2 MG/ML
4 INJECTION INTRAMUSCULAR; INTRAVENOUS ONCE
Status: COMPLETED | OUTPATIENT
Start: 2018-08-09 | End: 2018-08-09

## 2018-08-09 RX ADMIN — ONDANSETRON HYDROCHLORIDE 4 MG: 2 INJECTION, SOLUTION INTRAMUSCULAR; INTRAVENOUS at 10:20

## 2018-08-09 RX ADMIN — SODIUM CHLORIDE 1000 ML: 9 INJECTION, SOLUTION INTRAVENOUS at 11:19

## 2018-08-09 RX ADMIN — SODIUM CHLORIDE 1000 ML: 9 INJECTION, SOLUTION INTRAVENOUS at 10:20

## 2018-08-09 RX ADMIN — IOPAMIDOL 100 ML: 612 INJECTION, SOLUTION INTRAVENOUS at 11:45

## 2018-08-09 RX ADMIN — MORPHINE SULFATE 4 MG: 4 INJECTION, SOLUTION INTRAMUSCULAR; INTRAVENOUS at 10:25

## 2018-08-09 RX ADMIN — HUMAN INSULIN 15 UNITS: 100 INJECTION, SOLUTION SUBCUTANEOUS at 10:26

## 2018-08-09 NOTE — ED PROVIDER NOTES
Subjective   43-year-old female presents for right lower quadrant abdominal pain.  She states this is been going on intermittently for the last 5 days.  She states the pain does not radiate.  She states it has gotten worse over the last 2 days.  She describes it as a dull and throbbing pain.  She states she has had nausea.  She had one episode of vomiting yesterday.  She states she always has diarrhea due to irritable bowel syndrome.  She states her pain is better if she lays on her right side and is worse if she tries to eat.  She states she has had this pain in the past.  She has tried Tums and Pepto-Bismol at home with no relief.  She states her doctor sent her to [facility] yesterday because her pancreatic enzymes were elevated.  She states they talked about admitting her but there were no beds so they decided to send her home.  She states she has not eaten today.  She is also not taken her insulin today.  She states she has had a decreased appetite.  She reports her pain is currently an 8 out of 10.        History provided by:  Patient  Abdominal Pain   Pain location:  RLQ  Pain quality: dull and throbbing    Pain radiates to:  Does not radiate  Pain severity:  Moderate  Onset quality:  Gradual  Duration:  5 days  Timing:  Intermittent  Progression:  Worsening  Chronicity:  New  Context: not recent travel, not sick contacts, not suspicious food intake and not trauma    Relieved by:  Position changes  Worsened by:  Eating  Ineffective treatments:  OTC medications  Associated symptoms: diarrhea (chronically), nausea and vomiting    Associated symptoms: no anorexia, no chest pain, no chills, no constipation, no cough, no dysuria, no fatigue, no fever, no hematemesis, no hematochezia, no melena, no shortness of breath and no sore throat    Risk factors: obesity    Risk factors: not pregnant        Review of Systems   Constitutional: Positive for appetite change. Negative for chills, fatigue and fever.  "  HENT: Negative for sore throat.    Eyes: Negative.    Respiratory: Negative for cough and shortness of breath.    Cardiovascular: Negative for chest pain.   Gastrointestinal: Positive for abdominal pain, diarrhea (chronically), nausea and vomiting. Negative for anorexia, blood in stool, constipation, hematemesis, hematochezia and melena.   Genitourinary: Negative.  Negative for dysuria.   Musculoskeletal: Negative.    Skin: Negative.    Neurological: Negative.    Psychiatric/Behavioral: Negative.    All other systems reviewed and are negative.      Past Medical History:   Diagnosis Date   • Asthma    • Diabetes mellitus (CMS/HCC)    • IBS (irritable bowel syndrome)    • Scoliosis        Allergies   Allergen Reactions   • Ibuprofen Hives and Other (See Comments)     \"smiley\"   • Latex Hives       Past Surgical History:   Procedure Laterality Date   • CHOLECYSTECTOMY     • COLONOSCOPY     • CYST REMOVAL     • ENDOSCOPY     • TUBAL ABDOMINAL LIGATION         Family History   Problem Relation Age of Onset   • Diabetes Other    • Cancer Other    • Lung cancer Other        Social History     Social History   • Marital status: Unknown     Social History Main Topics   • Smoking status: Never Smoker   • Smokeless tobacco: Never Used   • Alcohol use No   • Drug use: Unknown     Other Topics Concern   • Not on file           Objective   Physical Exam   Constitutional: She is oriented to person, place, and time. She appears well-developed and well-nourished. No distress.   HENT:   Head: Normocephalic and atraumatic.   Right Ear: External ear normal.   Left Ear: External ear normal.   Nose: Nose normal.   Mouth/Throat: Oropharynx is clear and moist.   Eyes: Pupils are equal, round, and reactive to light. Conjunctivae and EOM are normal.   Neck: Normal range of motion. Neck supple.   Cardiovascular: Normal rate, regular rhythm, normal heart sounds and intact distal pulses.    Pulmonary/Chest: Effort normal and breath sounds " normal. She exhibits no tenderness.   Abdominal: Soft. Bowel sounds are normal. There is tenderness (mild to RLQ). There is no rebound and no guarding.   Musculoskeletal: Normal range of motion.   Neurological: She is alert and oriented to person, place, and time.   Skin: Skin is warm and dry. Capillary refill takes less than 2 seconds.   Psychiatric: She has a normal mood and affect. Her behavior is normal. Judgment and thought content normal.   Nursing note and vitals reviewed.      Procedures           ED Course  ED Course as of Aug 09 1447   Thu Aug 09, 2018   1015 9:49 - sinus rhythm, rate of 81, no acute ischemia, reviewed by Dr. Agarwal ECG 12 Lead []   1114 Records reviewed from [facility].  It appears her PCP actually recommended she go there yesterday due to ketones present.  Will check a ketone level.  []   1446 Patient's blood sugar is improving.  I recommended that she follow up with her PCP to discuss changes in her diabetes medication to improve her blood sugar control.  She will return to the ED if her symptoms change or worsen.  []      ED Course User Index  [] Cassie Chung PA                  MDM  Number of Diagnoses or Management Options  Right lower quadrant abdominal pain:   Type 2 diabetes mellitus with hyperglycemia, with long-term current use of insulin (CMS/HCC):      Amount and/or Complexity of Data Reviewed  Clinical lab tests: reviewed  Tests in the radiology section of CPT®: reviewed    Patient Progress  Patient progress: improved        Final diagnoses:   Type 2 diabetes mellitus with hyperglycemia, with long-term current use of insulin (CMS/HCC)   Right lower quadrant abdominal pain            Cassie Chung PA  08/09/18 1449

## 2018-08-09 NOTE — ED NOTES
Faxed relase of medical records form to [facility] for pts results      Wilver Padilla  08/09/18 2551

## 2018-08-24 ENCOUNTER — OFFICE VISIT (OUTPATIENT)
Dept: GASTROENTEROLOGY | Facility: CLINIC | Age: 43
End: 2018-08-24

## 2018-08-24 ENCOUNTER — LAB (OUTPATIENT)
Dept: LAB | Facility: HOSPITAL | Age: 43
End: 2018-08-24

## 2018-08-24 VITALS
OXYGEN SATURATION: 98 % | BODY MASS INDEX: 37.64 KG/M2 | HEIGHT: 67 IN | HEART RATE: 84 BPM | WEIGHT: 239.8 LBS | SYSTOLIC BLOOD PRESSURE: 115 MMHG | DIASTOLIC BLOOD PRESSURE: 87 MMHG

## 2018-08-24 DIAGNOSIS — R19.7 DIARRHEA, UNSPECIFIED TYPE: Primary | ICD-10-CM

## 2018-08-24 DIAGNOSIS — R19.7 DIARRHEA, UNSPECIFIED TYPE: ICD-10-CM

## 2018-08-24 DIAGNOSIS — R10.9 ABDOMINAL CRAMPING: ICD-10-CM

## 2018-08-24 DIAGNOSIS — R10.31 RLQ ABDOMINAL PAIN: ICD-10-CM

## 2018-08-24 DIAGNOSIS — R11.0 NAUSEA: ICD-10-CM

## 2018-08-24 PROCEDURE — 83993 ASSAY FOR CALPROTECTIN FECAL: CPT

## 2018-08-24 PROCEDURE — 99214 OFFICE O/P EST MOD 30 MIN: CPT | Performed by: PHYSICIAN ASSISTANT

## 2018-08-24 RX ORDER — METRONIDAZOLE 500 MG/1
500 TABLET ORAL 3 TIMES DAILY
Qty: 30 TABLET | Refills: 0 | Status: SHIPPED | OUTPATIENT
Start: 2018-08-24 | End: 2018-09-07
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
## 2018-09-01 ENCOUNTER — LAB (OUTPATIENT)
Dept: LAB | Facility: HOSPITAL | Age: 43
End: 2018-09-01

## 2018-09-01 DIAGNOSIS — R10.9 ABDOMINAL CRAMPING: ICD-10-CM

## 2018-09-01 DIAGNOSIS — R19.7 DIARRHEA, UNSPECIFIED TYPE: ICD-10-CM

## 2018-09-01 LAB
027 TOXIN: NORMAL
C DIFF TOX GENS STL QL NAA+PROBE: NEGATIVE
HEMOCCULT STL QL: POSITIVE

## 2018-09-01 PROCEDURE — 87045 FECES CULTURE AEROBIC BACT: CPT

## 2018-09-01 PROCEDURE — 87899 AGENT NOS ASSAY W/OPTIC: CPT

## 2018-09-01 PROCEDURE — 82272 OCCULT BLD FECES 1-3 TESTS: CPT

## 2018-09-01 PROCEDURE — 87186 SC STD MICRODIL/AGAR DIL: CPT

## 2018-09-01 PROCEDURE — 87046 STOOL CULTR AEROBIC BACT EA: CPT

## 2018-09-01 PROCEDURE — 82656 EL-1 FECAL QUAL/SEMIQ: CPT

## 2018-09-01 PROCEDURE — 87493 C DIFF AMPLIFIED PROBE: CPT

## 2018-09-04 ENCOUNTER — TELEPHONE (OUTPATIENT)
Dept: GASTROENTEROLOGY | Facility: CLINIC | Age: 43
End: 2018-09-04

## 2018-09-04 LAB — BACTERIA SPEC AEROBE CULT: NORMAL

## 2018-09-04 NOTE — TELEPHONE ENCOUNTER
Please ck to see how she is feeling, diarrhea improved at all? Not all stool tests back yet. Please schedule f/u appt in 1-2 weeks. Thanks.

## 2018-09-04 NOTE — TELEPHONE ENCOUNTER
I called and spoke with patient, she states she is having severe abdominal pain now and some diarrhea. She has an appointment scheduled for 9/7/18.

## 2018-09-07 ENCOUNTER — OFFICE VISIT (OUTPATIENT)
Dept: GASTROENTEROLOGY | Facility: CLINIC | Age: 43
End: 2018-09-07

## 2018-09-07 VITALS
HEART RATE: 90 BPM | WEIGHT: 243 LBS | SYSTOLIC BLOOD PRESSURE: 121 MMHG | OXYGEN SATURATION: 94 % | DIASTOLIC BLOOD PRESSURE: 90 MMHG | BODY MASS INDEX: 38.14 KG/M2 | HEIGHT: 67 IN

## 2018-09-07 DIAGNOSIS — R93.89 ABNORMAL CT SCAN: ICD-10-CM

## 2018-09-07 DIAGNOSIS — R10.84 GENERALIZED ABDOMINAL PAIN: ICD-10-CM

## 2018-09-07 DIAGNOSIS — K21.9 GASTROESOPHAGEAL REFLUX DISEASE, ESOPHAGITIS PRESENCE NOT SPECIFIED: ICD-10-CM

## 2018-09-07 DIAGNOSIS — Z86.19 HISTORY OF HELICOBACTER PYLORI INFECTION: ICD-10-CM

## 2018-09-07 DIAGNOSIS — R19.7 DIARRHEA, UNSPECIFIED TYPE: Primary | ICD-10-CM

## 2018-09-07 DIAGNOSIS — R19.5 POSITIVE FECAL OCCULT BLOOD TEST: ICD-10-CM

## 2018-09-07 PROCEDURE — 99214 OFFICE O/P EST MOD 30 MIN: CPT | Performed by: PHYSICIAN ASSISTANT

## 2018-09-07 RX ORDER — RANITIDINE 300 MG/1
300 TABLET ORAL 2 TIMES DAILY
Qty: 28 TABLET | Refills: 0 | Status: SHIPPED | OUTPATIENT
Start: 2018-09-07 | End: 2023-01-24

## 2018-09-13 PROBLEM — R19.5 POSITIVE FECAL OCCULT BLOOD TEST: Status: ACTIVE | Noted: 2018-09-13

## 2018-09-13 PROBLEM — K21.9 GASTROESOPHAGEAL REFLUX DISEASE: Status: ACTIVE | Noted: 2018-09-13

## 2018-09-13 PROBLEM — R10.84 GENERALIZED ABDOMINAL PAIN: Status: ACTIVE | Noted: 2018-09-13

## 2018-09-14 LAB — CALPROTECTIN STL-MCNT: <16 UG/G (ref 0–120)

## 2018-09-21 ENCOUNTER — ANESTHESIA (OUTPATIENT)
Dept: PERIOP | Facility: HOSPITAL | Age: 43
End: 2018-09-21

## 2018-09-21 ENCOUNTER — ANESTHESIA EVENT (OUTPATIENT)
Dept: PERIOP | Facility: HOSPITAL | Age: 43
End: 2018-09-21

## 2018-09-21 ENCOUNTER — HOSPITAL ENCOUNTER (OUTPATIENT)
Facility: HOSPITAL | Age: 43
Setting detail: HOSPITAL OUTPATIENT SURGERY
Discharge: HOME | End: 2018-09-21
Attending: SURGERY | Admitting: SURGERY

## 2018-09-21 VITALS
BODY MASS INDEX: 37.77 KG/M2 | DIASTOLIC BLOOD PRESSURE: 78 MMHG | HEART RATE: 75 BPM | OXYGEN SATURATION: 98 % | HEIGHT: 66 IN | TEMPERATURE: 97.5 F | RESPIRATION RATE: 20 BRPM | WEIGHT: 235 LBS | SYSTOLIC BLOOD PRESSURE: 109 MMHG

## 2018-09-21 DIAGNOSIS — R10.84 GENERALIZED ABDOMINAL PAIN: ICD-10-CM

## 2018-09-21 DIAGNOSIS — R19.5 POSITIVE FECAL OCCULT BLOOD TEST: ICD-10-CM

## 2018-09-21 DIAGNOSIS — R19.7 DIARRHEA, UNSPECIFIED TYPE: ICD-10-CM

## 2018-09-21 DIAGNOSIS — Z86.19 HISTORY OF HELICOBACTER PYLORI INFECTION: ICD-10-CM

## 2018-09-21 DIAGNOSIS — K21.9 GASTROESOPHAGEAL REFLUX DISEASE, ESOPHAGITIS PRESENCE NOT SPECIFIED: ICD-10-CM

## 2018-09-21 LAB — GLUCOSE BLDC GLUCOMTR-MCNC: 242 MG/DL (ref 70–130)

## 2018-09-21 PROCEDURE — 25010000002 PROPOFOL 10 MG/ML EMULSION: Performed by: NURSE ANESTHETIST, CERTIFIED REGISTERED

## 2018-09-21 PROCEDURE — 82962 GLUCOSE BLOOD TEST: CPT

## 2018-09-21 PROCEDURE — 25010000002 PROPOFOL 1000 MG/ML EMULSION: Performed by: NURSE ANESTHETIST, CERTIFIED REGISTERED

## 2018-09-21 PROCEDURE — 25010000002 MIDAZOLAM PER 1 MG: Performed by: NURSE ANESTHETIST, CERTIFIED REGISTERED

## 2018-09-21 PROCEDURE — 45378 DIAGNOSTIC COLONOSCOPY: CPT | Performed by: SURGERY

## 2018-09-21 PROCEDURE — 43239 EGD BIOPSY SINGLE/MULTIPLE: CPT | Performed by: SURGERY

## 2018-09-21 PROCEDURE — 25010000002 FENTANYL CITRATE (PF) 100 MCG/2ML SOLUTION: Performed by: NURSE ANESTHETIST, CERTIFIED REGISTERED

## 2018-09-21 RX ORDER — MIDAZOLAM HYDROCHLORIDE 1 MG/ML
1 INJECTION INTRAMUSCULAR; INTRAVENOUS
Status: DISCONTINUED | OUTPATIENT
Start: 2018-09-21 | End: 2018-09-21 | Stop reason: HOSPADM

## 2018-09-21 RX ORDER — FENTANYL CITRATE 50 UG/ML
50 INJECTION, SOLUTION INTRAMUSCULAR; INTRAVENOUS
Status: CANCELLED | OUTPATIENT
Start: 2018-09-21

## 2018-09-21 RX ORDER — IPRATROPIUM BROMIDE AND ALBUTEROL SULFATE 2.5; .5 MG/3ML; MG/3ML
3 SOLUTION RESPIRATORY (INHALATION) ONCE AS NEEDED
Status: CANCELLED | OUTPATIENT
Start: 2018-09-21

## 2018-09-21 RX ORDER — SODIUM CHLORIDE 0.9 % (FLUSH) 0.9 %
1-10 SYRINGE (ML) INJECTION AS NEEDED
Status: DISCONTINUED | OUTPATIENT
Start: 2018-09-21 | End: 2018-09-21 | Stop reason: HOSPADM

## 2018-09-21 RX ORDER — FENTANYL CITRATE 50 UG/ML
INJECTION, SOLUTION INTRAMUSCULAR; INTRAVENOUS AS NEEDED
Status: DISCONTINUED | OUTPATIENT
Start: 2018-09-21 | End: 2018-09-21 | Stop reason: SURG

## 2018-09-21 RX ORDER — MIDAZOLAM HYDROCHLORIDE 1 MG/ML
2 INJECTION INTRAMUSCULAR; INTRAVENOUS
Status: DISCONTINUED | OUTPATIENT
Start: 2018-09-21 | End: 2018-09-21 | Stop reason: HOSPADM

## 2018-09-21 RX ORDER — ONDANSETRON 2 MG/ML
4 INJECTION INTRAMUSCULAR; INTRAVENOUS ONCE AS NEEDED
Status: CANCELLED | OUTPATIENT
Start: 2018-09-21

## 2018-09-21 RX ORDER — MIDAZOLAM HYDROCHLORIDE 1 MG/ML
INJECTION INTRAMUSCULAR; INTRAVENOUS AS NEEDED
Status: DISCONTINUED | OUTPATIENT
Start: 2018-09-21 | End: 2018-09-21 | Stop reason: SURG

## 2018-09-21 RX ORDER — LIDOCAINE HYDROCHLORIDE 20 MG/ML
INJECTION, SOLUTION INFILTRATION; PERINEURAL AS NEEDED
Status: DISCONTINUED | OUTPATIENT
Start: 2018-09-21 | End: 2018-09-21 | Stop reason: SURG

## 2018-09-21 RX ORDER — SODIUM CHLORIDE, SODIUM LACTATE, POTASSIUM CHLORIDE, CALCIUM CHLORIDE 600; 310; 30; 20 MG/100ML; MG/100ML; MG/100ML; MG/100ML
125 INJECTION, SOLUTION INTRAVENOUS CONTINUOUS
Status: DISCONTINUED | OUTPATIENT
Start: 2018-09-21 | End: 2018-09-21 | Stop reason: HOSPADM

## 2018-09-21 RX ORDER — PROPOFOL 10 MG/ML
VIAL (ML) INTRAVENOUS AS NEEDED
Status: DISCONTINUED | OUTPATIENT
Start: 2018-09-21 | End: 2018-09-21 | Stop reason: SURG

## 2018-09-21 RX ADMIN — PROPOFOL 150 MCG/KG/MIN: 10 INJECTION, EMULSION INTRAVENOUS at 07:35

## 2018-09-21 RX ADMIN — MIDAZOLAM HYDROCHLORIDE 2 MG: 1 INJECTION, SOLUTION INTRAMUSCULAR; INTRAVENOUS at 07:32

## 2018-09-21 RX ADMIN — SODIUM CHLORIDE, POTASSIUM CHLORIDE, SODIUM LACTATE AND CALCIUM CHLORIDE: 600; 310; 30; 20 INJECTION, SOLUTION INTRAVENOUS at 07:32

## 2018-09-21 RX ADMIN — FENTANYL CITRATE 100 MCG: 50 INJECTION INTRAMUSCULAR; INTRAVENOUS at 07:32

## 2018-09-21 RX ADMIN — PROPOFOL 30 MG: 10 INJECTION, EMULSION INTRAVENOUS at 07:35

## 2018-09-21 RX ADMIN — LIDOCAINE HYDROCHLORIDE 100 MG: 20 INJECTION, SOLUTION INFILTRATION; PERINEURAL at 07:35

## 2018-09-21 NOTE — ANESTHESIA PREPROCEDURE EVALUATION
Anesthesia Evaluation     no history of anesthetic complications:  NPO Solid Status: > 8 hours  NPO Liquid Status: > 8 hours           Airway   Mallampati: II  TM distance: >3 FB  Neck ROM: full  No difficulty expected  Dental    (+) poor dentition    Pulmonary - normal exam   (+) asthma,   Cardiovascular - normal exam        Neuro/Psych  GI/Hepatic/Renal/Endo    (+) obesity,  GERD,  diabetes mellitus,     Musculoskeletal     Abdominal  - normal exam   Substance History      OB/GYN          Other                        Anesthesia Plan    ASA 3     general     intravenous induction   Anesthetic plan, all risks, benefits, and alternatives have been provided, discussed and informed consent has been obtained with: patient.

## 2018-09-21 NOTE — H&P (VIEW-ONLY)
Chief Complaint   Patient presents with   • Diarrhea   • Abdominal Pain       Marquita Vernon is a 43 y.o. female who presents to the office today for follow up appointment regarding Diarrhea and Abdominal Pain.    HPI  Since her last office visit, she is feeling some better. Diarrhea has decreased in frequency. She has 1 stool daily which is loose now. She took Flagyl as directed for 10 days and that seemed to help with her diarrhea. Abdominal pain is still present, generalized and sporadic which can be very severe. She had diarrhea all day approx 1 week ago but it seems to be gradually improving. She was not able to take H pylori breath test after last was positive. She is currently taking the Prilosec 20 mg twice daily for GERD.    9/1/2018 stool studies:  Occult blood positive  C diff negative  Stool culture normal  Pancreatic elastase could not be completed because stool too watery     EGD/colonoscopy was performed in 2013 and she was diagnosed with H. Pylori and completed the treatment at that time. There is also history of pancreatitis.  Takes ultram as needed and has uncontrolled DM type 2.       Abdominal film 12/2/2017:  IMPRESSION:  Nonobstructive bowel gas pattern.     CT abd/pelv 8/9/2018:  Impression:  1. QUESTION MILD ENTERITIS BOWEL GAS PATTERN. NO BOWEL OBSTRUCTION OR  INFLAMMATORY CHANGES OF THE GI TRACT.  2. OTHERWISE NO ACUTE FINDINGS IDENTIFIED WITHIN ABDOMEN OR PELVIS.  SPECIFICALLY, THE APPENDIX IS NONINFLAMED.  3. SMALL SLIDING HIATAL HERNIA. FAT ONLY CONTAINING ANTERIOR ABDOMINAL  WALL HERNIA.  4. OTHER NONACUTE/INCIDENTAL FINDINGS AS ABOVE.     Review of Systems   Constitutional: Positive for fatigue.   HENT: Negative for trouble swallowing.    Eyes: Negative.    Respiratory: Negative.    Cardiovascular: Negative for chest pain.   Gastrointestinal: Positive for abdominal distention, abdominal pain, diarrhea, nausea and vomiting. Negative for anal bleeding, blood in stool, constipation and rectal  "pain.   Endocrine: Negative for heat intolerance.   Genitourinary: Negative.    Musculoskeletal: Positive for arthralgias, back pain and myalgias.   Skin: Negative.    Allergic/Immunologic: Positive for environmental allergies. Negative for food allergies.   Neurological: Negative for dizziness and light-headedness.   Hematological: Bruises/bleeds easily.   Psychiatric/Behavioral: The patient is not nervous/anxious.      Past Medical History:   Diagnosis Date   • Asthma    • Diabetes mellitus (CMS/HCC)    • IBS (irritable bowel syndrome)    • Scoliosis      Past Surgical History:   Procedure Laterality Date   • CHOLECYSTECTOMY     • COLONOSCOPY     • CYST REMOVAL     • ENDOSCOPY     • TUBAL ABDOMINAL LIGATION       Family History   Problem Relation Age of Onset   • Diabetes Other    • Cancer Other    • Lung cancer Other      Social History   Substance Use Topics   • Smoking status: Never Smoker   • Smokeless tobacco: Never Used   • Alcohol use No       CURRENT MEDICATION:  •  Cholecalciferol (VITAMIN D3) 5000 units capsule capsule, Take 5,000 Units by mouth Daily., Disp: , Rfl:   •  insulin aspart (novoLOG FLEXPEN) 100 UNIT/ML solution pen-injector sc pen, Inject  under the skin 3 (Three) Times a Day With Meals., Disp: , Rfl:   •  insulin glargine (LANTUS) 100 UNIT/ML injection, Inject  under the skin Daily., Disp: , Rfl:   •  montelukast (SINGULAIR) 10 MG tablet, Take 10 mg by mouth Every Night., Disp: , Rfl:   •  omeprazole (priLOSEC) 20 MG capsule, Take 1 capsule by mouth 2 (Two) Times a Day Before Meals., Disp: 20 capsule, Rfl: 0  •  PARoxetine (PAXIL) 10 MG tablet, Take 10 mg by mouth Every Morning., Disp: , Rfl:   •  traMADol (ULTRAM) 50 MG tablet, Take 50 mg by mouth Every 12 (Twelve) Hours., Disp: , Rfl:     ALLERGIES:  Ibuprofen and Latex    VISIT VITALS:  /90   Pulse 90   Ht 170.2 cm (67\")   Wt 110 kg (243 lb)   SpO2 94%   BMI 38.06 kg/m²     Physical Exam   Constitutional: She is oriented to " person, place, and time. She appears well-developed and well-nourished. No distress.   HENT:   Head: Normocephalic and atraumatic.   Nose: Nose normal.   Mouth/Throat: Oropharynx is clear and moist.   Eyes: Conjunctivae are normal. Right eye exhibits no discharge. Left eye exhibits no discharge. No scleral icterus.   Neck: Normal range of motion. No JVD present.   Cardiovascular: Normal rate, regular rhythm and normal heart sounds.  Exam reveals no gallop and no friction rub.    No murmur heard.  Pulmonary/Chest: Effort normal and breath sounds normal. No respiratory distress. She has no wheezes. She has no rales. She exhibits no tenderness.   Abdominal: Soft. Bowel sounds are normal. She exhibits no mass. There is tenderness (generalized, moderate).   Musculoskeletal: Normal range of motion. She exhibits no edema or deformity.   Neurological: She is alert and oriented to person, place, and time. Coordination normal.   Skin: Skin is warm and dry. No rash noted. She is not diaphoretic. No erythema.   Psychiatric: She has a normal mood and affect. Her behavior is normal. Judgment and thought content normal.   Vitals reviewed.      Assessment/Plan     1. Diarrhea, unspecified type    2. Generalized abdominal pain    3. Positive fecal occult blood test    4. Abnormal CT scan    5. History of Helicobacter pylori infection    6. Gastroesophageal reflux disease, esophagitis presence not specified      Orders Placed This Encounter   Procedures   • H. Pylori Breath Test - , Lung   • Follow Anesthesia Guidelines / Standing Orders     ESOPHAGOGASTRODUODENOSCOPY WITH BIOPSY CPT CODE: 05668 (N/A) COLONOSCOPY  She will need an esophagogastroduodenoscopy and colonoscopy performed with IV general sedation. All of the risks, benefits and alternatives of these procedures have been discussed with her, all of her questions have been answered and she has elected to proceed. She should follow up in the office after these procedures to  discuss the results and further recommendations can be made at that time.    New Medications Ordered This Visit   Medications   • raNITIdine (ZANTAC) 300 MG tablet     Sig: Take 1 tablet by mouth 2 (Two) Times a Day.     Dispense:  28 tablet     Refill:  0   • polyethylene glycol (GoLYTELY) 236 g solution     Sig: Starting at 6pm the day before procedure, drink 8 ounces every 30 minutes until all gone or stools are clear. May add flavor packet.     Dispense:  4000 mL     Refill:  0     H pylori breath test will be completed in 2 weeks. She will hold PPI during this time and take Zantac instead.             Return for follow up after procedure to discuss results.      Electronically signed 9/7/2018 1:36 PM  Dianna Walters PA-C, [facility]

## 2018-09-21 NOTE — ANESTHESIA POSTPROCEDURE EVALUATION
Patient: Marquita Vernon    Procedure Summary     Date:  09/21/18 Room / Location:  [facility] OR [address] [place] [address] COR OR    Anesthesia Start:  0732 Anesthesia Stop:  0803    Procedures:       ESOPHAGOGASTRODUODENOSCOPY WITH BIOPSY CPT CODE: 53820 (N/A Esophagus)      COLONOSCOPY CPT CODE: 71144 (N/A ) Diagnosis:       Generalized abdominal pain      Positive fecal occult blood test      History of Helicobacter pylori infection      Gastroesophageal reflux disease, esophagitis presence not specified      Diarrhea, unspecified type      (Generalized abdominal pain [R10.84])      (Positive fecal occult blood test [R19.5])      (History of Helicobacter pylori infection [Z86.19])      (Gastroesophageal reflux disease, esophagitis presence not specified [K21.9])      (Diarrhea, unspecified type [R19.7])    Surgeon:  Micah Swartz MD Provider:  Clayton Sung MD    Anesthesia Type:  general ASA Status:  3          Anesthesia Type: general  Last vitals  BP   105/78 (09/21/18 0820)   Temp   97.5 °F (36.4 °C) (09/21/18 0805)   Pulse   80 (09/21/18 0820)   Resp   20 (09/21/18 0820)     SpO2   96 % (09/21/18 0820)     Post Anesthesia Care and Evaluation    Patient location during evaluation: PHASE II  Patient participation: complete - patient participated  Level of consciousness: awake and alert  Pain score: 1  Pain management: adequate  Airway patency: patent  Anesthetic complications: No anesthetic complications  PONV Status: controlled  Cardiovascular status: acceptable  Respiratory status: acceptable  Hydration status: acceptable

## 2018-09-21 NOTE — OP NOTE
ESOPHAGOGASTRODUODENOSCOPY WITH BIOPSY, COLONOSCOPY  Procedure Note    Marquita Vernon  9/21/2018    Pre-op Diagnosis:   Generalized abdominal pain [R10.84]  Positive fecal occult blood test [R19.5]  History of Helicobacter pylori infection [Z86.19]  Gastroesophageal reflux disease, esophagitis presence not specified [K21.9]  Diarrhea, unspecified type [R19.7]    Post-op Diagnosis:   GERD, esophagitis, normal colon, gastritis    Indications: see above    Procedure(s):  ESOPHAGOGASTRODUODENOSCOPY WITH BIOPSY CPT CODE: 55891  COLONOSCOPY CPT CODE: 17068    Surgeon(s):  Micah Swartz MD    Anesthesia: General    Staff:   Circulator: Juju Vernon RN  Endo Technician: Dieudonne Wright    Findings: mild gastritis, reflux esophagitis, normal colon    Operative Procedure: The patient was taken operating suite and placed in left lateral decubitus position.  Bilateral sequential compression devices were in place and IV anesthesia was administered.  Timeout procedure was performed.  The endoscope was inserted into the oropharynx and the esophagus was intubated.  The scope was advanced to the third portion of the duodenum.  The scope was slowly withdrawn evaluating all mucosal areas.  The patient had mild enterogastritis.  Biopsy forceps were used to obtain specimens of the antral mucosa.  Retroflexion showed no evidence of paraesophageal or hiatal hernia.  The scope was slowly withdrawn and the patient GE junction showed reflux changes with some esophagitis but no significant ulceration or mass.  The scope was slowly withdrawn after biopsy forceps were used to sample the GE junction.  The remaining esophageal mucosa was then normal limits.  digital rectal examination was then performed.  The patient had hypertrophic papilla circumferentially around the anal canal but no masses were palpated.  The colonoscope was inserted and advanced to the cecum as evidenced by the appendiceal orifice and ileocecal valve.  The prep was  fair.  The scope was slowly withdrawn and the entirety of the colonic mucosa was evaluated and was within normal limits.  No source of occult blood was identified from a lower GI standpoint.    Estimated Blood Loss: minimal    Specimens:   Antral biopsy, GE junction biopsy                 Drains: none    Grafts or Implants: none    Complications: none    Recommendations: Screening colonoscopy in 10 years unless symptoms develop, continue PPI therapy    Micah Swartz MD     Date: 9/21/2018  Time: 8:12 AM

## 2018-09-24 ENCOUNTER — RESULTS ENCOUNTER (OUTPATIENT)
Dept: GASTROENTEROLOGY | Facility: CLINIC | Age: 43
End: 2018-09-24

## 2018-09-24 DIAGNOSIS — Z86.19 HISTORY OF HELICOBACTER PYLORI INFECTION: ICD-10-CM

## 2018-09-25 LAB
LAB AP CASE REPORT: NORMAL
PATH REPORT.FINAL DX SPEC: NORMAL

## 2018-10-05 ENCOUNTER — OFFICE VISIT (OUTPATIENT)
Dept: GASTROENTEROLOGY | Facility: CLINIC | Age: 43
End: 2018-10-05

## 2018-10-05 VITALS
HEIGHT: 66 IN | BODY MASS INDEX: 39.15 KG/M2 | SYSTOLIC BLOOD PRESSURE: 121 MMHG | HEART RATE: 88 BPM | WEIGHT: 243.6 LBS | OXYGEN SATURATION: 98 % | DIASTOLIC BLOOD PRESSURE: 85 MMHG

## 2018-10-05 DIAGNOSIS — K29.50 OTHER CHRONIC GASTRITIS WITHOUT HEMORRHAGE: ICD-10-CM

## 2018-10-05 DIAGNOSIS — R10.9 ABDOMINAL CRAMPING: Primary | ICD-10-CM

## 2018-10-05 DIAGNOSIS — K21.00 GASTROESOPHAGEAL REFLUX DISEASE WITH ESOPHAGITIS: ICD-10-CM

## 2018-10-05 DIAGNOSIS — Z86.19 HISTORY OF HELICOBACTER PYLORI INFECTION: ICD-10-CM

## 2018-10-05 PROBLEM — R10.84 GENERALIZED ABDOMINAL PAIN: Status: RESOLVED | Noted: 2018-09-13 | Resolved: 2018-10-05

## 2018-10-05 PROBLEM — R19.00 ABDOMINAL SWELLING: Status: RESOLVED | Noted: 2017-11-16 | Resolved: 2018-10-05

## 2018-10-05 PROBLEM — R10.30 LOWER ABDOMINAL PAIN: Status: RESOLVED | Noted: 2017-11-16 | Resolved: 2018-10-05

## 2018-10-05 PROBLEM — R19.7 DIARRHEA: Status: RESOLVED | Noted: 2017-11-16 | Resolved: 2018-10-05

## 2018-10-05 PROBLEM — R14.3 FLATULENCE: Status: RESOLVED | Noted: 2017-11-16 | Resolved: 2018-10-05

## 2018-10-05 PROBLEM — R19.5 POSITIVE FECAL OCCULT BLOOD TEST: Status: RESOLVED | Noted: 2018-09-13 | Resolved: 2018-10-05

## 2018-10-05 PROBLEM — R11.0 NAUSEA: Status: RESOLVED | Noted: 2017-11-16 | Resolved: 2018-10-05

## 2018-10-05 PROCEDURE — 99213 OFFICE O/P EST LOW 20 MIN: CPT | Performed by: PHYSICIAN ASSISTANT

## 2018-10-05 NOTE — PROGRESS NOTES
: 1975    Chief Complaint   Patient presents with   • Diarrhea       Marquita Vernon is a 43 y.o. female who presents to the office today as a follow up appointment regarding diarrhea.    History of Present Illness:  Today, she states that she is feeling so much better.  She is no longer having severe diarrhea with fecal urgency or severe abdominal pain.  She states that she is still having mild, abdominal cramping which is generalized and sporadic.  It is less frequent than previous.  She took Flagyl previously without much relief of symptoms.  She later had EGD and colonoscopy for further evaluation.  There is also a history of H. pylori.  She has not taken Prilosec since about 2 weeks ago and has only been taking Zantac 300 mg as needed for relief of heartburn.  Acid reflux is still occurring intermittently but better relieved with Prilosec has taken previously.    She had EGD and colonoscopy performed by Dr. Swartz on 2018 which revealed mild gastritis, reflux esophagitis, normal colon.  Pathology showed acute and chronic inflammation in the GE junction and mild chronic gastritis of the antrum.  No H. pylori was found on biopsies.    Review of Systems   Constitutional: Positive for fatigue.   HENT: Negative for trouble swallowing.    Eyes: Negative.    Respiratory: Negative.    Cardiovascular: Negative for chest pain.   Gastrointestinal: Positive for abdominal pain. Negative for abdominal distention, anal bleeding, blood in stool, constipation, diarrhea, nausea, rectal pain and vomiting.   Endocrine: Negative for heat intolerance.   Genitourinary: Negative.    Musculoskeletal: Positive for arthralgias, back pain and myalgias.   Skin: Negative.    Allergic/Immunologic: Positive for environmental allergies. Negative for food allergies.   Neurological: Negative for dizziness and light-headedness.   Hematological: Bruises/bleeds easily.   Psychiatric/Behavioral: The patient is not nervous/anxious.         Past Medical History:   Diagnosis Date   • Arthritis    • Asthma    • Diabetes mellitus (CMS/HCC)    • GERD (gastroesophageal reflux disease)    • IBS (irritable bowel syndrome)    • Pancreatitis 2017   • Scoliosis        Past Surgical History:   Procedure Laterality Date   • CHOLECYSTECTOMY     • COLONOSCOPY     • COLONOSCOPY N/A 9/21/2018    Procedure: COLONOSCOPY CPT CODE: 70822;  Surgeon: Micah Swartz MD;  Location: [facility];  Service: Gastroenterology   • CYST REMOVAL     • ENDOSCOPY     • ENDOSCOPY N/A 9/21/2018    Procedure: ESOPHAGOGASTRODUODENOSCOPY WITH BIOPSY CPT CODE: 19109;  Surgeon: Micah Swartz MD;  Location: [facility] OR;  Service: Gastroenterology   • TUBAL ABDOMINAL LIGATION     • UPPER GASTROINTESTINAL ENDOSCOPY  2015 2018       Family History   Problem Relation Age of Onset   • Diabetes Other    • Cancer Other    • Lung cancer Other        Social History     Social History   • Marital status:      Social History Main Topics   • Smoking status: Never Smoker   • Smokeless tobacco: Never Used   • Alcohol use No   • Drug use: No   • Sexual activity: Yes     Partners: Male     Birth control/ protection: Surgical     Other Topics Concern   • Not on file       Current Outpatient Prescriptions:   •  Cholecalciferol (VITAMIN D3) 5000 units capsule capsule, Take 5,000 Units by mouth Daily., Disp: , Rfl:   •  insulin aspart (novoLOG FLEXPEN) 100 UNIT/ML solution pen-injector sc pen, Inject  under the skin 3 (Three) Times a Day With Meals., Disp: , Rfl:   •  insulin glargine (LANTUS) 100 UNIT/ML injection, Inject  under the skin Daily., Disp: , Rfl:   •  montelukast (SINGULAIR) 10 MG tablet, Take 10 mg by mouth Every Night., Disp: , Rfl:   •  PARoxetine (PAXIL) 10 MG tablet, Take 10 mg by mouth Every Morning., Disp: , Rfl:   •  raNITIdine (ZANTAC) 300 MG tablet, Take 1 tablet by mouth 2 (Two) Times a Day., Disp: 28 tablet, Rfl: 0  •  traMADol (ULTRAM) 50 MG tablet, Take 50 mg  "by mouth Every 12 (Twelve) Hours., Disp: , Rfl:     Allergies:   Ibuprofen; Latex; and Vaseline [petrolatum]    Vitals:  /85 (BP Location: Left arm, Patient Position: Sitting, Cuff Size: Adult)   Pulse 88   Ht 167.6 cm (66\")   Wt 110 kg (243 lb 9.6 oz)   SpO2 98%   BMI 39.32 kg/m²     Physical Exam   Constitutional: She is oriented to person, place, and time. She appears well-developed and well-nourished. No distress.   HENT:   Head: Normocephalic and atraumatic.   Nose: Nose normal.   Mouth/Throat: Oropharynx is clear and moist.   Eyes: Conjunctivae are normal. Right eye exhibits no discharge. Left eye exhibits no discharge. No scleral icterus.   Neck: Normal range of motion. No JVD present.   Cardiovascular: Normal rate, regular rhythm and normal heart sounds.  Exam reveals no gallop and no friction rub.    No murmur heard.  Pulmonary/Chest: Effort normal and breath sounds normal. No respiratory distress. She has no wheezes. She has no rales. She exhibits no tenderness.   Abdominal: Soft. Bowel sounds are normal. She exhibits no mass. There is tenderness (Mild, generalized).   Musculoskeletal: Normal range of motion. She exhibits no edema or deformity.   Neurological: She is alert and oriented to person, place, and time. Coordination normal.   Skin: Skin is warm and dry. No rash noted. She is not diaphoretic. No erythema.   Psychiatric: She has a normal mood and affect. Her behavior is normal. Judgment and thought content normal.   Vitals reviewed.      Assessment/Plan:  1. Abdominal cramping    2. History of Helicobacter pylori infection    3. Gastroesophageal reflux disease with esophagitis    4. Other chronic gastritis without hemorrhage      Her symptoms have greatly improved.  She still having mild, intermittent abdominal cramping.  She will call with worsening symptoms.  Continue current medications and resume Prilosec 20 mg twice daily 30 minutes before meals for relief of GERD and gastritis.  H. " pylori was negative on recent pathology from EGD.  She does not need to complete the breath test as previously recommended.        Return if symptoms worsen or fail to improve.      Electronically signed 10/23/2018 11:09 AM  Dianna Walters PA-C, [facility]

## 2018-11-09 ENCOUNTER — APPOINTMENT (OUTPATIENT)
Dept: GENERAL RADIOLOGY | Facility: HOSPITAL | Age: 43
End: 2018-11-09

## 2018-11-09 ENCOUNTER — HOSPITAL ENCOUNTER (EMERGENCY)
Facility: HOSPITAL | Age: 43
Discharge: HOME | End: 2018-11-09
Attending: EMERGENCY MEDICINE

## 2018-11-09 VITALS
OXYGEN SATURATION: 100 % | HEART RATE: 92 BPM | DIASTOLIC BLOOD PRESSURE: 82 MMHG | TEMPERATURE: 98.8 F | SYSTOLIC BLOOD PRESSURE: 109 MMHG | RESPIRATION RATE: 18 BRPM | BODY MASS INDEX: 35.68 KG/M2 | WEIGHT: 222 LBS | HEIGHT: 66 IN

## 2018-11-09 DIAGNOSIS — R07.89 ATYPICAL CHEST PAIN: Primary | ICD-10-CM

## 2018-11-09 DIAGNOSIS — M41.24 OTHER IDIOPATHIC SCOLIOSIS, THORACIC REGION: ICD-10-CM

## 2018-11-09 LAB
ALBUMIN SERPL-MCNC: 3.9 G/DL (ref 3.5–5)
ALBUMIN/GLOB SERPL: 1.6 G/DL (ref 1.5–2.5)
ALP SERPL-CCNC: 94 U/L (ref 35–104)
ALT SERPL W P-5'-P-CCNC: 18 U/L (ref 10–36)
ANION GAP SERPL CALCULATED.3IONS-SCNC: 8.1 MMOL/L (ref 3.6–11.2)
AST SERPL-CCNC: 13 U/L (ref 10–30)
BASOPHILS # BLD AUTO: 0.04 10*3/MM3 (ref 0–0.3)
BASOPHILS NFR BLD AUTO: 0.6 % (ref 0–2)
BILIRUB SERPL-MCNC: 0.6 MG/DL (ref 0.2–1.8)
BILIRUB UR QL STRIP: NEGATIVE
BNP SERPL-MCNC: 4 PG/ML (ref 0–100)
BUN BLD-MCNC: 13 MG/DL (ref 7–21)
BUN/CREAT SERPL: 13.1 (ref 7–25)
CALCIUM SPEC-SCNC: 8.8 MG/DL (ref 7.7–10)
CHLORIDE SERPL-SCNC: 102 MMOL/L (ref 99–112)
CLARITY UR: CLEAR
CO2 SERPL-SCNC: 24.9 MMOL/L (ref 24.3–31.9)
COLOR UR: YELLOW
CREAT BLD-MCNC: 0.99 MG/DL (ref 0.43–1.29)
DEPRECATED RDW RBC AUTO: 39.5 FL (ref 37–54)
EOSINOPHIL # BLD AUTO: 0.27 10*3/MM3 (ref 0–0.7)
EOSINOPHIL NFR BLD AUTO: 4 % (ref 0–5)
ERYTHROCYTE [DISTWIDTH] IN BLOOD BY AUTOMATED COUNT: 12.9 % (ref 11.5–14.5)
GFR SERPL CREATININE-BSD FRML MDRD: 61 ML/MIN/1.73
GLOBULIN UR ELPH-MCNC: 2.5 GM/DL
GLUCOSE BLD-MCNC: 351 MG/DL (ref 70–110)
GLUCOSE BLDC GLUCOMTR-MCNC: 376 MG/DL (ref 70–130)
GLUCOSE UR STRIP-MCNC: ABNORMAL MG/DL
HCT VFR BLD AUTO: 38.6 % (ref 37–47)
HGB BLD-MCNC: 13.1 G/DL (ref 12–16)
HGB UR QL STRIP.AUTO: NEGATIVE
IMM GRANULOCYTES # BLD: 0.03 10*3/MM3 (ref 0–0.03)
IMM GRANULOCYTES NFR BLD: 0.4 % (ref 0–0.5)
KETONES UR QL STRIP: ABNORMAL
LEUKOCYTE ESTERASE UR QL STRIP.AUTO: NEGATIVE
LIPASE SERPL-CCNC: 21 U/L (ref 13–60)
LYMPHOCYTES # BLD AUTO: 2.17 10*3/MM3 (ref 1–3)
LYMPHOCYTES NFR BLD AUTO: 32.2 % (ref 21–51)
MCH RBC QN AUTO: 29.1 PG (ref 27–33)
MCHC RBC AUTO-ENTMCNC: 33.9 G/DL (ref 33–37)
MCV RBC AUTO: 85.8 FL (ref 80–94)
MONOCYTES # BLD AUTO: 0.39 10*3/MM3 (ref 0.1–0.9)
MONOCYTES NFR BLD AUTO: 5.8 % (ref 0–10)
NEUTROPHILS # BLD AUTO: 3.84 10*3/MM3 (ref 1.4–6.5)
NEUTROPHILS NFR BLD AUTO: 57 % (ref 30–70)
NITRITE UR QL STRIP: NEGATIVE
OSMOLALITY SERPL CALC.SUM OF ELEC: 284.2 MOSM/KG (ref 273–305)
PH UR STRIP.AUTO: <=5 [PH] (ref 5–8)
PLATELET # BLD AUTO: 202 10*3/MM3 (ref 130–400)
PMV BLD AUTO: 12.6 FL (ref 6–10)
POTASSIUM BLD-SCNC: 3.4 MMOL/L (ref 3.5–5.3)
PROT SERPL-MCNC: 6.4 G/DL (ref 6–8)
PROT UR QL STRIP: NEGATIVE
RBC # BLD AUTO: 4.5 10*6/MM3 (ref 4.2–5.4)
SODIUM BLD-SCNC: 135 MMOL/L (ref 135–153)
SP GR UR STRIP: >1.03 (ref 1–1.03)
TROPONIN I SERPL-MCNC: <0.006 NG/ML
UROBILINOGEN UR QL STRIP: ABNORMAL
WBC NRBC COR # BLD: 6.74 10*3/MM3 (ref 4.5–12.5)

## 2018-11-09 PROCEDURE — 84484 ASSAY OF TROPONIN QUANT: CPT | Performed by: EMERGENCY MEDICINE

## 2018-11-09 PROCEDURE — 93005 ELECTROCARDIOGRAM TRACING: CPT | Performed by: EMERGENCY MEDICINE

## 2018-11-09 PROCEDURE — 36415 COLL VENOUS BLD VENIPUNCTURE: CPT

## 2018-11-09 PROCEDURE — 82962 GLUCOSE BLOOD TEST: CPT

## 2018-11-09 PROCEDURE — 85025 COMPLETE CBC W/AUTO DIFF WBC: CPT | Performed by: EMERGENCY MEDICINE

## 2018-11-09 PROCEDURE — 81003 URINALYSIS AUTO W/O SCOPE: CPT | Performed by: EMERGENCY MEDICINE

## 2018-11-09 PROCEDURE — 93010 ELECTROCARDIOGRAM REPORT: CPT | Performed by: INTERNAL MEDICINE

## 2018-11-09 PROCEDURE — 99284 EMERGENCY DEPT VISIT MOD MDM: CPT

## 2018-11-09 PROCEDURE — 83690 ASSAY OF LIPASE: CPT | Performed by: EMERGENCY MEDICINE

## 2018-11-09 PROCEDURE — 71046 X-RAY EXAM CHEST 2 VIEWS: CPT | Performed by: RADIOLOGY

## 2018-11-09 PROCEDURE — 71046 X-RAY EXAM CHEST 2 VIEWS: CPT

## 2018-11-09 PROCEDURE — 80053 COMPREHEN METABOLIC PANEL: CPT | Performed by: EMERGENCY MEDICINE

## 2018-11-09 PROCEDURE — 83880 ASSAY OF NATRIURETIC PEPTIDE: CPT | Performed by: EMERGENCY MEDICINE

## 2018-11-09 RX ORDER — ACETAMINOPHEN AND CODEINE PHOSPHATE 300; 30 MG/1; MG/1
1 TABLET ORAL EVERY 4 HOURS PRN
Qty: 10 TABLET | Refills: 0 | Status: SHIPPED | OUTPATIENT
Start: 2018-11-09 | End: 2018-11-26

## 2018-11-10 NOTE — ED PROVIDER NOTES
"Subjective   Patient presents to ER with chest pain        Chest Pain   Pain location:  Unable to specify  Pain radiates to:  Does not radiate  Pain severity:  Mild  Onset quality:  Gradual  Relieved by:  Nothing  Worsened by:  Nothing  Ineffective treatments:  None tried  Associated symptoms: back pain        Review of Systems   Constitutional: Positive for activity change.   HENT: Negative.    Eyes: Negative.    Respiratory: Negative.    Cardiovascular: Positive for chest pain.   Endocrine: Negative.    Genitourinary: Negative.    Musculoskeletal: Positive for back pain.   Skin: Negative.    Allergic/Immunologic: Negative.    Neurological: Negative.    Hematological: Negative.    Psychiatric/Behavioral: Negative.        Past Medical History:   Diagnosis Date   • Arthritis    • Asthma    • Diabetes mellitus (CMS/HCC)    • GERD (gastroesophageal reflux disease)    • IBS (irritable bowel syndrome)    • Pancreatitis 2017   • Scoliosis        Allergies   Allergen Reactions   • Ibuprofen Hives and Other (See Comments)     \"smiley\"   • Latex Hives   • Vaseline [Petrolatum] Hives       Past Surgical History:   Procedure Laterality Date   • CHOLECYSTECTOMY     • COLONOSCOPY     • COLONOSCOPY N/A 9/21/2018    Procedure: COLONOSCOPY CPT CODE: 42324;  Surgeon: Micah Swartz MD;  Location: [facility];  Service: Gastroenterology   • CYST REMOVAL     • ENDOSCOPY     • ENDOSCOPY N/A 9/21/2018    Procedure: ESOPHAGOGASTRODUODENOSCOPY WITH BIOPSY CPT CODE: 16064;  Surgeon: Micah Swartz MD;  Location: [facility];  Service: Gastroenterology   • TUBAL ABDOMINAL LIGATION     • UPPER GASTROINTESTINAL ENDOSCOPY  2015 2018       Family History   Problem Relation Age of Onset   • Diabetes Other    • Cancer Other    • Lung cancer Other        Social History     Social History   • Marital status:      Social History Main Topics   • Smoking status: Never Smoker   • Smokeless tobacco: Never Used   • Alcohol use No   • " Drug use: No   • Sexual activity: Yes     Partners: Male     Birth control/ protection: Surgical     Other Topics Concern   • Not on file           Objective   Physical Exam   Constitutional: She appears well-developed and well-nourished.   HENT:   Head: Normocephalic and atraumatic.   Eyes: Pupils are equal, round, and reactive to light. EOM are normal.   Neck: Normal range of motion.   Cardiovascular: Normal rate and regular rhythm.    Pulmonary/Chest: Effort normal.   Abdominal: Soft.   Musculoskeletal: Normal range of motion.   Neurological: She is alert.   Skin: Skin is warm.   Psychiatric: She has a normal mood and affect.   Nursing note and vitals reviewed.      Procedures           ED Course                  MDM      Final diagnoses:   Atypical chest pain   Other idiopathic scoliosis, thoracic region            Cornel So MD  11/09/18 1953

## 2018-11-10 NOTE — ED NOTES
"Patient reports she was sent here by her PCP for an abnormal EKG; her  states \"her heart rate was real arrythmatic and fast\"; patient states she had a sudden onset of chest pain that started approximately 2 hours ago after eating dinner. Patient states she was eating a salad at the time. Patient also reports shortness of breath and nausea. Patient denies any cardiac hx. Patient is alert and oriented x4; no acute distress noted at this time. Stretcher locked and in lowest position with side rails up x1, call light within reach of patient. Patient denies any needs at this time.     Johanny Khan, RN  11/09/18 1914    "

## 2018-11-26 ENCOUNTER — OFFICE VISIT (OUTPATIENT)
Dept: CARDIOLOGY | Facility: CLINIC | Age: 43
End: 2018-11-26

## 2018-11-26 VITALS
WEIGHT: 226 LBS | DIASTOLIC BLOOD PRESSURE: 80 MMHG | BODY MASS INDEX: 36.32 KG/M2 | SYSTOLIC BLOOD PRESSURE: 118 MMHG | HEIGHT: 66 IN | HEART RATE: 94 BPM

## 2018-11-26 DIAGNOSIS — E88.81 METABOLIC SYNDROME: ICD-10-CM

## 2018-11-26 DIAGNOSIS — Z79.4 TYPE 2 DIABETES MELLITUS WITHOUT COMPLICATION, WITH LONG-TERM CURRENT USE OF INSULIN (HCC): ICD-10-CM

## 2018-11-26 DIAGNOSIS — E11.9 TYPE 2 DIABETES MELLITUS WITHOUT COMPLICATION, WITH LONG-TERM CURRENT USE OF INSULIN (HCC): ICD-10-CM

## 2018-11-26 DIAGNOSIS — R07.89 CHEST PRESSURE: ICD-10-CM

## 2018-11-26 DIAGNOSIS — R00.2 PALPITATIONS: ICD-10-CM

## 2018-11-26 DIAGNOSIS — R55 SYNCOPE AND COLLAPSE: Primary | ICD-10-CM

## 2018-11-26 DIAGNOSIS — R06.02 SHORTNESS OF BREATH: ICD-10-CM

## 2018-11-26 PROBLEM — E88.810 METABOLIC SYNDROME: Status: ACTIVE | Noted: 2018-11-26

## 2018-11-26 PROCEDURE — 99204 OFFICE O/P NEW MOD 45 MIN: CPT | Performed by: INTERNAL MEDICINE

## 2018-11-26 PROCEDURE — 93000 ELECTROCARDIOGRAM COMPLETE: CPT | Performed by: INTERNAL MEDICINE

## 2018-11-26 RX ORDER — LORATADINE 10 MG/1
10 CAPSULE, LIQUID FILLED ORAL DAILY
COMMUNITY

## 2018-11-26 RX ORDER — TIZANIDINE 4 MG/1
4 TABLET ORAL EVERY 6 HOURS PRN
COMMUNITY

## 2018-11-26 NOTE — PATIENT INSTRUCTIONS
Mediterranean Diet  A Mediterranean diet refers to food and lifestyle choices that are based on the traditions of countries located on the [place]. This way of eating has been shown to help prevent certain conditions and improve outcomes for people who have chronic diseases, like kidney disease and heart disease.  What are tips for following this plan?  Lifestyle  · Cook and eat meals together with your family, when possible.  · Drink enough fluid to keep your urine clear or pale yellow.  · Be physically active every day. This includes:  ? Aerobic exercise like running or swimming.  ? Leisure activities like gardening, walking, or housework.  · Get 7-8 hours of sleep each night.  · If recommended by your health care provider, drink red wine in moderation. This means 1 glass a day for nonpregnant women and 2 glasses a day for men. A glass of wine equals 5 oz (150 mL).  Reading food labels  · Check the serving size of packaged foods. For foods such as rice and pasta, the serving size refers to the amount of cooked product, not dry.  · Check the total fat in packaged foods. Avoid foods that have saturated fat or trans fats.  · Check the ingredients list for added sugars, such as corn syrup.  Shopping  · At the grocery store, buy most of your food from the areas near the walls of the store. This includes:  ? Fresh fruits and vegetables (produce).  ? Grains, beans, nuts, and seeds. Some of these may be available in unpackaged forms or large amounts (in bulk).  ? Fresh seafood.  ? Poultry and eggs.  ? Low-fat dairy products.  · Buy whole ingredients instead of prepackaged foods.  · Buy fresh fruits and vegetables in-season from local farmers markets.  · Buy frozen fruits and vegetables in resealable bags.  · If you do not have access to quality fresh seafood, buy precooked frozen shrimp or canned fish, such as tuna, salmon, or sardines.  · Buy small amounts of raw or cooked vegetables, salads, or olives from the  deli or salad bar at your store.  · Stock your pantry so you always have certain foods on hand, such as olive oil, canned tuna, canned tomatoes, rice, pasta, and beans.  Cooking  · Cook foods with extra-virgin olive oil instead of using butter or other vegetable oils.  · Have meat as a side dish, and have vegetables or grains as your main dish. This means having meat in small portions or adding small amounts of meat to foods like pasta or stew.  · Use beans or vegetables instead of meat in common dishes like chili or lasagna.  · [place] with different cooking methods. Try roasting or broiling vegetables instead of steaming or sautéeing them.  · Add frozen vegetables to soups, stews, pasta, or rice.  · Add nuts or seeds for added healthy fat at each meal. You can add these to yogurt, salads, or vegetable dishes.  · Marinate fish or vegetables using olive oil, lemon juice, garlic, and fresh herbs.  Meal planning  · Plan to eat 1 vegetarian meal one day each week. Try to work up to 2 vegetarian meals, if possible.  · Eat seafood 2 or more times a week.  · Have healthy snacks readily available, such as:  ? Vegetable sticks with hummus.  ? Greek yogurt.  ? Fruit and nut trail mix.  · Eat balanced meals throughout the week. This includes:  ? Fruit: 2-3 servings a day  ? Vegetables: 4-5 servings a day  ? Low-fat dairy: 2 servings a day  ? Fish, poultry, or lean meat: 1 serving a day  ? Beans and legumes: 2 or more servings a week  ? Nuts and seeds: 1-2 servings a day  ? Whole grains: 6-8 servings a day  ? Extra-virgin olive oil: 3-4 servings a day  · Limit red meat and sweets to only a few servings a month  What are my food choices?  · Mediterranean diet  ? Recommended  ? Grains: Whole-grain pasta. Brown rice. Bulgar wheat. Polenta. Couscous. Whole-wheat bread. Oatmeal. Quinoa.  ? Vegetables: Artichokes. Beets. Broccoli. Cabbage. Carrots. Eggplant. Green beans. Chard. Kale. Spinach. Onions. Leeks. Peas. Squash.  Tomatoes. Peppers. Radishes.  ? Fruits: Apples. Apricots. Avocado. Berries. Bananas. Cherries. Dates. Figs. Grapes. Christina. Melon. Oranges. Peaches. Plums. Pomegranate.  ? Meats and other protein foods: Beans. Almonds. Sunflower seeds. Pine nuts. Peanuts. Cod. [place]. Scallops. Shrimp. Tuna. Tilapia. Clams. Oysters. Eggs.  ? Dairy: Low-fat milk. Cheese. Greek yogurt.  ? Beverages: Water. Red wine. Herbal tea.  ? Fats and oils: Extra virgin olive oil. Avocado oil. Grape seed oil.  ? Sweets and desserts: Greek yogurt with honey. Baked apples. Poached pears. Trail mix.  ? Seasoning and other foods: Basil. Cilantro. Coriander. Cumin. Mint. Parsley. [place]. Rosemary. Tarragon. Garlic. Oregano. Thyme. Pepper. Balsalmic vinegar. Tahini. Hummus. Tomato sauce. Olives. Mushrooms.  ? Limit these  ? Grains: Prepackaged pasta or rice dishes. Prepackaged cereal with added sugar.  ? Vegetables: Deep fried potatoes (french fries).  ? Fruits: Fruit canned in syrup.  ? Meats and other protein foods: Beef. Pork. Lamb. Poultry with skin. Hot dogs. Ruiz.  ? Dairy: Ice cream. Sour cream. Whole milk.  ? Beverages: Juice. Sugar-sweetened soft drinks. Beer. Liquor and spirits.  ? Fats and oils: Butter. Canola oil. Vegetable oil. Beef fat (tallow). Lard.  ? Sweets and desserts: Cookies. Cakes. Pies. Candy.  ? Seasoning and other foods: Mayonnaise. Premade sauces and marinades.  ? The items listed may not be a complete list. Talk with your dietitian about what dietary choices are right for you.  Summary  · The Mediterranean diet includes both food and lifestyle choices.  · Eat a variety of fresh fruits and vegetables, beans, nuts, seeds, and whole grains.  · Limit the amount of red meat and sweets that you eat.  · Talk with your health care provider about whether it is safe for you to drink red wine in moderation. This means 1 glass a day for nonpregnant women and 2 glasses a day for men. A glass of wine equals 5 oz (150 mL).  This information  is not intended to replace advice given to you by your health care provider. Make sure you discuss any questions you have with your health care provider.  Document Released: 08/10/2017 Document Revised: 09/12/2017 Document Reviewed: 08/10/2017  [employer] Interactive Patient Education © 2018 [employer]

## 2018-11-26 NOTE — PROGRESS NOTES
Chief Complaint   Patient presents with   • Establish Care     chest pain   • Loss of Consciousness     about 3 weeks ago, pt was by herself, has no way of knowing how long she was out. Went to PCP shortly after coming to, pulse was 162 when she arrived there. PCP contributed it to an inhaler( Symbicort) which he discontinued.   • Chest Pain     chest heaviness, SOB, occured while sitting, went to [place] [facility], felt it could be related to anxiety attack.    • Cardiac history     none   • Weight Loss     180 lb weight loss over several years, diet and exercise   • Labs     most recent in chart from ER visit, pt is not sure when a TSH has been checked   • Caffine     very little, usually drinks Propel flavored water, rarely has a soda        CARDIAC COMPLAINTS  chest pressure/discomfort, palpitations and syncope      Subjective   Marquita Vernon is a 43 y.o. female him in today for her initial cardiac evaluation.  She has history of diabetes who had a syncopal episode about 3 weeks ago.  On that day in the afternoon around 2:00 she was moving some objects when she started noticing palpitation in the form of heart racing associated with shortness of breath and dizziness.  The symptoms lasted for about 15-20 minutes.  She sat outside on the porch for a while and the symptoms slowly subsided.  She was asymptomatic after that.  Sometime in the night, she got up to go to the bathroom.  On the way, she is not sure what happened.  She woke up on the floor.  She was confused mildly, she had some chest discomfort and palpitation.  After sometimes, they subsided.  She got up and went to the bathroom, and later to her bed.  She is not sure how long she was unconscious.  She did not have any involuntary micturition.  She did not have any unusual pain anywhere in the body.  She did have some mild headache.  She was seen at the primary care physician's office the next day.  It was felt, that it could be related to Symbicort inhalers and  she is advised to discontinue it.  She also has been having some chest heaviness on and off associated with shortness of breath.  She has been to the emergency room and was told that it is not ischemic heart disease.  She has undergone multiple lab work and was told everything was normal including her lipids.  She also has last about 180 pounds over the last few years.  She also has episodes of frequent diarrhea.  She is not a smoker but heart disease does run in the family.    Past Surgical History:   Procedure Laterality Date   • CHOLECYSTECTOMY     • COLONOSCOPY     • CYST REMOVAL     • ENDOSCOPY     • TUBAL ABDOMINAL LIGATION     • UPPER GASTROINTESTINAL ENDOSCOPY  2015 2018       Current Outpatient Medications   Medication Sig Dispense Refill   • Cholecalciferol (VITAMIN D3) 5000 units capsule capsule Take 5,000 Units by mouth Daily.     • insulin aspart (novoLOG FLEXPEN) 100 UNIT/ML solution pen-injector sc pen Inject  under the skin 3 (Three) Times a Day With Meals.     • insulin glargine (LANTUS) 100 UNIT/ML injection Inject  under the skin Daily.     • Loratadine (CLARITIN) 10 MG capsule Take  by mouth Daily.     • Multiple Vitamins-Minerals (MULTIVITAMIN ADULT PO) Take  by mouth Daily.     • omeprazole (priLOSEC) 20 MG capsule Take 1 capsule by mouth 2 (Two) Times a Day Before Meals. (Patient taking differently: Take 20 mg by mouth Daily.) 20 capsule 0   • PARoxetine (PAXIL) 10 MG tablet Take 10 mg by mouth Every Morning.     • raNITIdine (ZANTAC) 300 MG tablet Take 1 tablet by mouth 2 (Two) Times a Day. 28 tablet 0   • tiZANidine (ZANAFLEX) 4 MG tablet Take 4 mg by mouth Every 6 (Six) Hours As Needed for Muscle Spasms.       No current facility-administered medications for this visit.            ALLERGIES:  Ibuprofen; Latex; and Vaseline [petrolatum]    Past Medical History:   Diagnosis Date   • Abnormal ECG    • Arthritis    • Asthma    • Diabetes mellitus (CMS/[facility])    • GERD (gastroesophageal reflux  "disease)    • IBS (irritable bowel syndrome)    • Pancreatitis 2017   • Scoliosis        Social History     Tobacco Use   Smoking Status Never Smoker   Smokeless Tobacco Never Used          Family History   Problem Relation Age of Onset   • Diabetes Other    • Cancer Other    • Lung cancer Other    • Heart attack Father         MI in his late 40's   • No Known Problems Sister        Review of Systems   Constitution: Positive for weight loss. Negative for decreased appetite and malaise/fatigue.   HENT: Negative for congestion and sore throat.    Eyes: Negative for blurred vision.   Cardiovascular: Positive for chest pain, leg swelling, near-syncope, palpitations, paroxysmal nocturnal dyspnea and syncope.   Respiratory: Positive for shortness of breath. Negative for snoring.    Endocrine: Negative for cold intolerance and heat intolerance.   Hematologic/Lymphatic: Negative for adenopathy. Does not bruise/bleed easily.   Skin: Negative for itching, nail changes and skin cancer.   Musculoskeletal: Negative for arthritis and myalgias.   Gastrointestinal: Positive for abdominal pain. Negative for dysphagia and heartburn.   Genitourinary: Negative for bladder incontinence and frequency.   Neurological: Positive for headaches. Negative for dizziness, light-headedness, seizures and vertigo.   Psychiatric/Behavioral: Negative for altered mental status.   Allergic/Immunologic: Negative for environmental allergies and hives.       Diabetes- Yes  Thyroid- normal    Objective     /80 (BP Location: Left arm)   Pulse 94   Ht 167.6 cm (66\")   Wt 103 kg (226 lb)   BMI 36.48 kg/m²     Physical Exam   Constitutional: She is oriented to person, place, and time. She appears well-developed and well-nourished.   HENT:   Head: Normocephalic.   Nose: Nose normal.   Eyes: EOM are normal. Pupils are equal, round, and reactive to light.   Neck: Normal range of motion. Neck supple.   Cardiovascular: Normal rate, regular rhythm, S1 " normal and S2 normal.   Murmur heard.  Pulmonary/Chest: Effort normal and breath sounds normal.   Abdominal: Soft. Bowel sounds are normal.   Musculoskeletal: Normal range of motion. She exhibits edema.   Neurological: She is alert and oriented to person, place, and time.   Skin: Skin is warm.   Psychiatric: She has a normal mood and affect.         ECG 12 Lead  Date/Time: 11/26/2018 1:35 PM  Performed by: Urszula Madden MD  Authorized by: Urszula Madden MD   Previous ECG: no previous ECG available  Rhythm: sinus rhythm  Rate: normal  QRS axis: normal  Clinical impression: non-specific ECG              Assessment/Plan   Patient's Body mass index is 36.48 kg/m². BMI is above normal parameters. Recommendations include: educational material, exercise counseling and nutrition counseling.     Marquita was seen today for establish care, loss of consciousness, chest pain, cardiac history, weight loss, labs and caffine.    Diagnoses and all orders for this visit:    Syncope and collapse  -     Stress Test With Myocardial Perfusion One Day; Future    Chest pressure  -     Stress Test With Myocardial Perfusion One Day; Future    Type 2 diabetes mellitus without complication, with long-term current use of insulin (CMS/[facility])    Metabolic syndrome    Palpitations  -     Adult Transthoracic Echo Complete W/ Cont if Necessary Per Protocol; Future    Shortness of breath  -     Adult Transthoracic Echo Complete W/ Cont if Necessary Per Protocol; Future       At baseline her heart rate is upper limit of normal, blood pressure is within normal limit.  Her BMI is 36.5.  Her EKG showed sinus rhythm with nonspecific ST-T changes.  Her clinical examination reveals short systolic murmur at the mitral area.  Some of her symptoms could be related to the drop in the blood pressure versus tachyarrhythmia.  Also talked to her about the heart rate.  At this time I did not start on any medication.  I scheduled her to undergo an  echocardiogram to rule out any valvular heart disease and also to rule out any structural heart disease.  I also scheduled her to undergo a stress test in the form of modified Norris to evaluate her functional status, chronotropic response and also to look for any stress-induced arrhythmia or ischemia.  Based on the results, she may need to be on a low dose of beta blockers.  If the stress test and echo were non diagnostic, then she may need other workup including extended monitor or loop recorder.  Based on the results, further recommendations will be made.  Regarding her BMI, I encourage her to continue the diet.  I also gave her papers on Mediterranean diet to help her.             Electronically signed by Urszula Madden MD November 26, 2018 1:25 PM

## 2018-11-27 ENCOUNTER — HOSPITAL ENCOUNTER (OUTPATIENT)
Dept: CARDIOLOGY | Facility: HOSPITAL | Age: 43
Discharge: HOME | End: 2018-11-27
Attending: INTERNAL MEDICINE

## 2018-11-27 DIAGNOSIS — R07.89 CHEST PRESSURE: ICD-10-CM

## 2018-11-27 DIAGNOSIS — R00.2 PALPITATIONS: ICD-10-CM

## 2018-11-27 DIAGNOSIS — R55 SYNCOPE AND COLLAPSE: ICD-10-CM

## 2018-11-27 DIAGNOSIS — R06.02 SHORTNESS OF BREATH: ICD-10-CM

## 2018-11-27 PROCEDURE — 0 TECHNETIUM SESTAMIBI: Performed by: INTERNAL MEDICINE

## 2018-11-27 PROCEDURE — 78452 HT MUSCLE IMAGE SPECT MULT: CPT | Performed by: INTERNAL MEDICINE

## 2018-11-27 PROCEDURE — 93306 TTE W/DOPPLER COMPLETE: CPT | Performed by: INTERNAL MEDICINE

## 2018-11-27 PROCEDURE — 93306 TTE W/DOPPLER COMPLETE: CPT

## 2018-11-27 PROCEDURE — 93017 CV STRESS TEST TRACING ONLY: CPT

## 2018-11-27 PROCEDURE — 78452 HT MUSCLE IMAGE SPECT MULT: CPT

## 2018-11-27 PROCEDURE — 93018 CV STRESS TEST I&R ONLY: CPT | Performed by: INTERNAL MEDICINE

## 2018-11-27 PROCEDURE — A9500 TC99M SESTAMIBI: HCPCS | Performed by: INTERNAL MEDICINE

## 2018-11-27 PROCEDURE — 25010000002 REGADENOSON 0.4 MG/5ML SOLUTION: Performed by: INTERNAL MEDICINE

## 2018-11-27 RX ADMIN — REGADENOSON 0.4 MG: 0.08 INJECTION, SOLUTION INTRAVENOUS at 09:58

## 2018-11-27 RX ADMIN — TECHNETIUM TC 99M SESTAMIBI 1 DOSE: 1 INJECTION INTRAVENOUS at 10:00

## 2018-11-27 RX ADMIN — TECHNETIUM TC 99M SESTAMIBI 1 DOSE: 1 INJECTION INTRAVENOUS at 08:31

## 2018-11-28 ENCOUNTER — TELEPHONE (OUTPATIENT)
Dept: CARDIOLOGY | Facility: CLINIC | Age: 43
End: 2018-11-28

## 2018-11-28 DIAGNOSIS — R55 SYNCOPE AND COLLAPSE: Primary | ICD-10-CM

## 2018-11-28 LAB
BH CV ECHO MEAS - ACS: 2.6 CM
BH CV ECHO MEAS - AO MEAN PG: 3.9 MMHG
BH CV ECHO MEAS - AO ROOT AREA (BSA CORRECTED): 1.6
BH CV ECHO MEAS - AO ROOT AREA: 8.9 CM^2
BH CV ECHO MEAS - AO ROOT DIAM: 3.4 CM
BH CV ECHO MEAS - AO V2 MEAN: 92.4 CM/SEC
BH CV ECHO MEAS - AO V2 VTI: 28.6 CM
BH CV ECHO MEAS - BSA(HAYCOCK): 2.2 M^2
BH CV ECHO MEAS - BSA: 2.1 M^2
BH CV ECHO MEAS - BZI_BMI: 36.5 KILOGRAMS/M^2
BH CV ECHO MEAS - BZI_METRIC_HEIGHT: 167.6 CM
BH CV ECHO MEAS - BZI_METRIC_WEIGHT: 102.5 KG
BH CV ECHO MEAS - EDV(CUBED): 41.8 ML
BH CV ECHO MEAS - EDV(MOD-SP4): 63 ML
BH CV ECHO MEAS - EDV(TEICH): 49.8 ML
BH CV ECHO MEAS - EF(CUBED): 70.4 %
BH CV ECHO MEAS - EF(MOD-SP4): 52.4 %
BH CV ECHO MEAS - EF(TEICH): 63.2 %
BH CV ECHO MEAS - ESV(CUBED): 12.4 ML
BH CV ECHO MEAS - ESV(MOD-SP4): 30 ML
BH CV ECHO MEAS - ESV(TEICH): 18.4 ML
BH CV ECHO MEAS - FS: 33.4 %
BH CV ECHO MEAS - IVS/LVPW: 0.99
BH CV ECHO MEAS - IVSD: 1.2 CM
BH CV ECHO MEAS - LA DIMENSION: 3.2 CM
BH CV ECHO MEAS - LA/AO: 0.96
BH CV ECHO MEAS - LV DIASTOLIC VOL/BSA (35-75): 29.9 ML/M^2
BH CV ECHO MEAS - LV IVRT: 0.13 SEC
BH CV ECHO MEAS - LV MASS(C)D: 134.9 GRAMS
BH CV ECHO MEAS - LV MASS(C)DI: 64 GRAMS/M^2
BH CV ECHO MEAS - LV SYSTOLIC VOL/BSA (12-30): 14.2 ML/M^2
BH CV ECHO MEAS - LVIDD: 3.5 CM
BH CV ECHO MEAS - LVIDS: 2.3 CM
BH CV ECHO MEAS - LVLD AP4: 7.5 CM
BH CV ECHO MEAS - LVLS AP4: 6.7 CM
BH CV ECHO MEAS - LVOT AREA (M): 4.2 CM^2
BH CV ECHO MEAS - LVOT AREA: 4.2 CM^2
BH CV ECHO MEAS - LVOT DIAM: 2.3 CM
BH CV ECHO MEAS - LVPWD: 1.2 CM
BH CV ECHO MEAS - MV A MAX VEL: 80 CM/SEC
BH CV ECHO MEAS - MV DEC SLOPE: 236 CM/SEC^2
BH CV ECHO MEAS - MV E MAX VEL: 74 CM/SEC
BH CV ECHO MEAS - MV E/A: 0.93
BH CV ECHO MEAS - RVDD: 2.6 CM
BH CV ECHO MEAS - SI(AO): 120.7 ML/M^2
BH CV ECHO MEAS - SI(CUBED): 14 ML/M^2
BH CV ECHO MEAS - SI(MOD-SP4): 15.7 ML/M^2
BH CV ECHO MEAS - SI(TEICH): 14.9 ML/M^2
BH CV ECHO MEAS - SV(AO): 254.3 ML
BH CV ECHO MEAS - SV(CUBED): 29.4 ML
BH CV ECHO MEAS - SV(MOD-SP4): 33 ML
BH CV ECHO MEAS - SV(TEICH): 31.5 ML
BH CV NUCLEAR PRIOR STUDY: 3
BH CV STRESS COMMENTS STAGE 1: NORMAL
BH CV STRESS DOSE REGADENOSON STAGE 1: 0.4
BH CV STRESS DURATION MIN STAGE 1: 0
BH CV STRESS DURATION SEC STAGE 1: 10
BH CV STRESS PROTOCOL 1: NORMAL
BH CV STRESS RECOVERY BP: NORMAL MMHG
BH CV STRESS RECOVERY HR: 104 BPM
BH CV STRESS STAGE 1: 1
LV EF NUC BP: 69 %
MAXIMAL PREDICTED HEART RATE: 177 BPM
MAXIMAL PREDICTED HEART RATE: 177 BPM
PERCENT MAX PREDICTED HR: 64.97 %
STRESS BASELINE BP: NORMAL MMHG
STRESS BASELINE HR: 86 BPM
STRESS PERCENT HR: 76 %
STRESS POST PEAK BP: NORMAL MMHG
STRESS POST PEAK HR: 115 BPM
STRESS TARGET HR: 150 BPM
STRESS TARGET HR: 150 BPM

## 2018-11-28 NOTE — TELEPHONE ENCOUNTER
----- Message from Urszula Madden MD sent at 11/28/2018  6:37 AM EST -----  Extended Monitor. Order is in

## 2018-11-28 NOTE — PROGRESS NOTES
Pt aware of results and recommendations to start Lopressor 25 mg BID and to wear an extended monitor. Will call back tomorrow to schedule.

## 2018-12-03 ENCOUNTER — TELEPHONE (OUTPATIENT)
Dept: CARDIOLOGY | Facility: CLINIC | Age: 43
End: 2018-12-03

## 2018-12-03 DIAGNOSIS — R55 SYNCOPE AND COLLAPSE: Primary | ICD-10-CM

## 2018-12-03 NOTE — TELEPHONE ENCOUNTER
To do a 21 day recording an Event Monitor is needed (you do not like the reports that come back with this type of monitor as it does not show all that you need).  Do you want to order the 14 day Cardiokey instead?

## 2018-12-03 NOTE — TELEPHONE ENCOUNTER
Received word this morning from [employer] to inform us that patient does not meet criteria for MCOT.  Do you want to change it to a 14 day Cardiokey?  If so please put in a new order.  Thank you

## 2018-12-04 NOTE — TELEPHONE ENCOUNTER
Patient aware that she didn't meet the criteria for the MCOT and it has been changed to 14 day Cardiokey.  Patient to come in on Thursday around 3:30  for monitor placement.

## 2018-12-06 ENCOUNTER — TELEPHONE (OUTPATIENT)
Dept: CARDIOLOGY | Facility: CLINIC | Age: 43
End: 2018-12-06

## 2018-12-06 ENCOUNTER — CLINICAL SUPPORT (OUTPATIENT)
Dept: CARDIOLOGY | Facility: CLINIC | Age: 43
End: 2018-12-06

## 2018-12-06 DIAGNOSIS — R55 SYNCOPE AND COLLAPSE: ICD-10-CM

## 2018-12-06 PROCEDURE — 0296T PR EXT ECG > 48HR TO 21 DAY RCRD W/CONECT INTL RCRD: CPT | Performed by: INTERNAL MEDICINE

## 2018-12-06 NOTE — TELEPHONE ENCOUNTER
Patient was in for Cardiokey placement, however patient is now waiting for larger electrodes to be shipped to her then she will place monitor herself. She will call when she receives electrodes and will wear for 14 days then.

## 2019-01-02 ENCOUNTER — OUTSIDE FACILITY SERVICE (OUTPATIENT)
Dept: CARDIOLOGY | Facility: CLINIC | Age: 44
End: 2019-01-02

## 2019-01-02 PROCEDURE — 0298T PR EXT ECG > 48HR TO 21 DAY REVIEW AND INTERPRETATN: CPT | Performed by: INTERNAL MEDICINE

## 2019-03-26 ENCOUNTER — OFFICE VISIT (OUTPATIENT)
Dept: CARDIOLOGY | Facility: CLINIC | Age: 44
End: 2019-03-26

## 2019-03-26 VITALS
HEART RATE: 84 BPM | SYSTOLIC BLOOD PRESSURE: 110 MMHG | DIASTOLIC BLOOD PRESSURE: 80 MMHG | WEIGHT: 216 LBS | HEIGHT: 66 IN | BODY MASS INDEX: 34.72 KG/M2

## 2019-03-26 DIAGNOSIS — Z79.4 TYPE 2 DIABETES MELLITUS WITHOUT COMPLICATION, WITH LONG-TERM CURRENT USE OF INSULIN (HCC): ICD-10-CM

## 2019-03-26 DIAGNOSIS — I47.1 PAROXYSMAL SVT (SUPRAVENTRICULAR TACHYCARDIA) (HCC): Primary | ICD-10-CM

## 2019-03-26 DIAGNOSIS — E11.9 TYPE 2 DIABETES MELLITUS WITHOUT COMPLICATION, WITH LONG-TERM CURRENT USE OF INSULIN (HCC): ICD-10-CM

## 2019-03-26 DIAGNOSIS — Z79.899 MEDICATION MANAGEMENT: ICD-10-CM

## 2019-03-26 DIAGNOSIS — E88.81 METABOLIC SYNDROME: ICD-10-CM

## 2019-03-26 DIAGNOSIS — R00.2 PALPITATIONS: ICD-10-CM

## 2019-03-26 PROCEDURE — 99213 OFFICE O/P EST LOW 20 MIN: CPT | Performed by: NURSE PRACTITIONER

## 2019-03-26 NOTE — PROGRESS NOTES
Chief Complaint   Patient presents with   • Follow-up     cardiac management.  After stress test, metoprolol added.  14 day monitor, one episode of SVT.  per result note, flecainide and EP referral if symptoms persist.  Pt reports only one episode since monitor, lasted only 15-20 seconds while moving brush.   • Med Refill     no refills needed.  pt did not bring list.  Verbalized med list.   • ASA     not on ASA   • Labs     Last labs 11/2018 in chart.       Subjective       Marquita Vernon is a 43 y.o. female  with a history of diabetes and family heart disease. She was seen in November for initial cardiac evaluation of syncopal episode.  She developed palpitation in the form of heart racing associated with shortness of breath and dizziness.  The symptoms lasted for about 15-20 minutes. Later in the night, she got up to go to the bathroom.  On the way, she is not sure what happened, but she woke up on the floor.  She was confused mildly, she had some chest discomfort and palpitation. She then seen PCP and it felt could be related to Symbicort inhalers and advised to discontinue.  She also has been having some chest heaviness on and off associated with shortness of breath.  She had been to the emergency room and was told that it was not ischemic heart disease.  Multiple labs had been done and told everything was normal including her lipids.  She also has last about 180 pounds over the last few years. At consultation no medications advised. A stress and echo were advised and done 11/27/18. LV function was normal and no ischemia noted. Metoprolol was added and extended cardiac monitor ordered.  Her baseline was sinus rhythm with one episode of SVT noted, 39 beats.     Today she comes to the office for a follow-up visit.  She denies chest pain, dizziness, or increased shortness of breath.  She admits to one brief episode of palpitations.  She continues beta-blocker in the form of metoprolol without problem.  Since her last  visit she has changed occupation which she finds less stressful.    HPI     Cardiac History:    Past Surgical History:   Procedure Laterality Date   • CARDIOVASCULAR STRESS TEST  11/27/2018    L. Cardiolite- Unable to walk. EF 69%. Negative.   • CHOLECYSTECTOMY     • COLONOSCOPY     • COLONOSCOPY N/A 9/21/2018    Procedure: COLONOSCOPY CPT CODE: 97324;  Surgeon: Micah Swartz MD;  [facility];  Service: Gastroenterology   • CYST REMOVAL     • ECHO - CONVERTED  11/27/2018    EF 65%. No AS. Mild MR   • ENDOSCOPY     • ENDOSCOPY N/A 9/21/2018    Procedure: ESOPHAGOGASTRODUODENOSCOPY WITH BIOPSY CPT CODE: 23708;  Surgeon: Micah Swartz MD;  Location: [facility];  Service: Gastroenterology   • OTHER SURGICAL HISTORY  01/02/2019    Event monitor- baseline sinus, one 39 beat SVT, no V-tach, no AFib, no pauses   • TUBAL ABDOMINAL LIGATION     • UPPER GASTROINTESTINAL ENDOSCOPY  2015 2018       Current Outpatient Medications   Medication Sig Dispense Refill   • Cholecalciferol (VITAMIN D3) 5000 units capsule capsule Take 5,000 Units by mouth Daily.     • insulin aspart (novoLOG FLEXPEN) 100 UNIT/ML solution pen-injector sc pen Inject  under the skin 3 (Three) Times a Day With Meals.     • insulin glargine (LANTUS) 100 UNIT/ML injection Inject  under the skin Daily.     • Loratadine (CLARITIN) 10 MG capsule Take  by mouth Daily.     • metoprolol tartrate (LOPRESSOR) 25 MG tablet Take 1 tablet by mouth 2 (Two) Times a Day. 180 tablet 3   • Multiple Vitamins-Minerals (MULTIVITAMIN ADULT PO) Take  by mouth Daily.     • omeprazole (priLOSEC) 20 MG capsule Take 1 capsule by mouth 2 (Two) Times a Day Before Meals. (Patient taking differently: Take 20 mg by mouth Daily.) 20 capsule 0   • PARoxetine (PAXIL) 10 MG tablet Take 10 mg by mouth Every Morning.     • raNITIdine (ZANTAC) 300 MG tablet Take 1 tablet by mouth 2 (Two) Times a Day. 28 tablet 0   • tiZANidine (ZANAFLEX) 4 MG tablet Take 4 mg by mouth  Every 6 (Six) Hours As Needed for Muscle Spasms.       No current facility-administered medications for this visit.        Ibuprofen; Latex; and Vaseline [petrolatum]    Past Medical History:   Diagnosis Date   • Abnormal ECG    • Arthritis    • Asthma    • Diabetes mellitus (CMS/HCC)    • GERD (gastroesophageal reflux disease)    • IBS (irritable bowel syndrome)    • Pancreatitis 2017   • Scoliosis        Social History     Socioeconomic History   • Marital status:      Spouse name: Not on file   • Number of children: Not on file   • Years of education: Not on file   • Highest education level: Not on file   Tobacco Use   • Smoking status: Never Smoker   • Smokeless tobacco: Never Used   Substance and Sexual Activity   • Alcohol use: No   • Drug use: No   • Sexual activity: Yes     Partners: Male     Birth control/protection: Surgical       Family History   Problem Relation Age of Onset   • Diabetes Other    • Cancer Other    • Lung cancer Other    • Heart attack Father         MI in his late 40's   • No Known Problems Sister        Review of Systems   Constitution: Negative for decreased appetite and weakness.   HENT: Negative for congestion, hoarse voice and nosebleeds.    Eyes: Negative for redness and visual disturbance.   Cardiovascular: Positive for palpitations (one episode). Negative for chest pain, irregular heartbeat, leg swelling and near-syncope.   Respiratory: Negative for cough and shortness of breath.    Endocrine: Negative for polydipsia, polyphagia and polyuria.   Hematologic/Lymphatic: Negative for bleeding problem. Does not bruise/bleed easily.   Skin: Negative for color change, dry skin and itching.   Musculoskeletal: Negative for muscle cramps and muscle weakness.   Gastrointestinal: Negative for abdominal pain, dysphagia and heartburn.   Genitourinary: Negative for dysuria and hematuria.   Neurological: Negative for dizziness, headaches and light-headedness.   Psychiatric/Behavioral:  "Negative for altered mental status. The patient is nervous/anxious (improved).    Allergic/Immunologic: Negative for hives and persistent infections.        Objective     /80 (BP Location: Right arm)   Pulse 84   Ht 167.6 cm (66\")   Wt 98 kg (216 lb)   BMI 34.86 kg/m²     Physical Exam   Constitutional: She is oriented to person, place, and time. She appears well-nourished.   HENT:   Head: Normocephalic.   Eyes: Conjunctivae are normal. Pupils are equal, round, and reactive to light.   Neck: Normal range of motion. Neck supple. Carotid bruit is not present.   Cardiovascular: Normal rate, regular rhythm, S1 normal and S2 normal.   No murmur heard.  Pulmonary/Chest: Breath sounds normal. She has no wheezes. She has no rales.   Abdominal: Soft. Bowel sounds are normal.   Musculoskeletal: Normal range of motion. She exhibits no edema.   Neurological: She is alert and oriented to person, place, and time.   Skin: Skin is warm and dry. No pallor.   Psychiatric: She has a normal mood and affect.        Procedures: none today        Assessment/Plan      Marquita was seen today for follow-up, med refill, asa and labs.    Diagnoses and all orders for this visit:    Paroxysmal SVT (supraventricular tachycardia) (CMS/HCC)    Palpitations    Type 2 diabetes mellitus without complication, with long-term current use of insulin (CMS/HCC)    Metabolic syndrome    Medication management    The reports of her stress test and echocardiogram were reviewed.  No ischemia was noted.  Palpitations have subsided.  She is tolerating the addition of metoprolol well.  Current blood pressure, heart rate, and heart rhythm are normal.  Advised to continue same dose of metoprolol.    Patient's Body mass index is 34.86 kg/m². BMI is above normal parameters. Recommendations include: nutrition counseling.  Her weight is down 10 pounds since last office visit.  I encouraged her to continue diet efforts.  Low impact cardio exercise encouraged at " least for 5 days a week.    She follows with you for lab orders/management.  Please forward copy of results as available.  Currently she is managing lipids by diet.    A 6-month follow-up visit scheduled.  Please call sooner for any cardiac concerns.           Electronically signed by ALIE Wellington,  March 27, 2019 7:47 AM

## 2019-03-26 NOTE — PATIENT INSTRUCTIONS
Palpitations  A palpitation is the feeling that your heartbeat is irregular or is faster than normal. It may feel like your heart is fluttering or skipping a beat. Palpitations are usually not a serious problem. They may be caused by many things, including smoking, caffeine, alcohol, stress, and certain medicines. Although most causes of palpitations are not serious, palpitations can be a sign of a serious medical problem. In some cases, you may need further medical evaluation.  Follow these instructions at home:  Pay attention to any changes in your symptoms. Take these actions to help with your condition:  · Avoid the following:  ? Caffeinated coffee, tea, soft drinks, diet pills, and energy drinks.  ? Chocolate.  ? Alcohol.  · Do not use any tobacco products, such as cigarettes, chewing tobacco, and e-cigarettes. If you need help quitting, ask your health care provider.  · Try to reduce your stress and anxiety. Things that can help you relax include:  ? Yoga.  ? Meditation.  ? Physical activity, such as swimming, jogging, or walking.  ? Biofeedback. This is a method that helps you learn to use your mind to control things in your body, such as your heartbeats.  · Get plenty of rest and sleep.  · Take over-the-counter and prescription medicines only as told by your health care provider.  · Keep all follow-up visits as told by your health care provider. This is important.    Contact a health care provider if:  · You continue to have a fast or irregular heartbeat after 24 hours.  · Your palpitations occur more often.  Get help right away if:  · You have chest pain or shortness of breath.  · You have a severe headache.  · You feel dizzy or you faint.  This information is not intended to replace advice given to you by your health care provider. Make sure you discuss any questions you have with your health care provider.  Document Released: 12/15/2001 Document Revised: 05/22/2017 Document Reviewed: 09/01/2016  Elsesiddharth  Interactive Patient Education © 2019 [employer]

## 2019-03-28 ENCOUNTER — HOSPITAL ENCOUNTER (EMERGENCY)
Facility: HOSPITAL | Age: 44
Discharge: HOME | End: 2019-03-28
Attending: EMERGENCY MEDICINE | Admitting: EMERGENCY MEDICINE

## 2019-03-28 VITALS
HEART RATE: 91 BPM | SYSTOLIC BLOOD PRESSURE: 107 MMHG | RESPIRATION RATE: 18 BRPM | WEIGHT: 208 LBS | HEIGHT: 66 IN | DIASTOLIC BLOOD PRESSURE: 78 MMHG | BODY MASS INDEX: 33.43 KG/M2 | TEMPERATURE: 98.2 F | OXYGEN SATURATION: 97 %

## 2019-03-28 DIAGNOSIS — E11.65 UNCONTROLLED TYPE 2 DIABETES MELLITUS WITH HYPERGLYCEMIA (HCC): Primary | ICD-10-CM

## 2019-03-28 LAB
6-ACETYL MORPHINE: NEGATIVE
ALBUMIN SERPL-MCNC: 4.05 G/DL (ref 3.5–5.2)
ALBUMIN/GLOB SERPL: 1.4 G/DL
ALP SERPL-CCNC: 97 U/L (ref 39–117)
ALT SERPL W P-5'-P-CCNC: 12 U/L (ref 1–33)
AMPHET+METHAMPHET UR QL: NEGATIVE
ANION GAP SERPL CALCULATED.3IONS-SCNC: 15.5 MMOL/L
ARTERIAL PATENCY WRIST A: POSITIVE
AST SERPL-CCNC: 12 U/L (ref 1–32)
ATMOSPHERIC PRESS: 735 MMHG
BARBITURATES UR QL SCN: NEGATIVE
BASE EXCESS BLDA CALC-SCNC: -5.5 MMOL/L
BASOPHILS # BLD AUTO: 0.05 10*3/MM3 (ref 0–0.2)
BASOPHILS NFR BLD AUTO: 0.7 % (ref 0–1.5)
BDY SITE: ABNORMAL
BENZODIAZ UR QL SCN: NEGATIVE
BILIRUB SERPL-MCNC: 0.3 MG/DL (ref 0.2–1.2)
BILIRUB UR QL STRIP: NEGATIVE
BODY TEMPERATURE: 98.6 C
BUN BLD-MCNC: 17 MG/DL (ref 6–20)
BUN/CREAT SERPL: 18.1 (ref 7–25)
BUPRENORPHINE SERPL-MCNC: NEGATIVE NG/ML
CALCIUM SPEC-SCNC: 9.1 MG/DL (ref 8.6–10.5)
CANNABINOIDS SERPL QL: NEGATIVE
CHLORIDE SERPL-SCNC: 101 MMOL/L (ref 98–107)
CLARITY UR: CLEAR
CO2 SERPL-SCNC: 21.5 MMOL/L (ref 22–29)
COCAINE UR QL: NEGATIVE
COHGB MFR BLD: 1.1 % (ref 0–5)
COLOR UR: YELLOW
CREAT BLD-MCNC: 0.94 MG/DL (ref 0.57–1)
DEPRECATED RDW RBC AUTO: 41.1 FL (ref 37–54)
EOSINOPHIL # BLD AUTO: 0.3 10*3/MM3 (ref 0–0.4)
EOSINOPHIL NFR BLD AUTO: 3.9 % (ref 0.3–6.2)
ERYTHROCYTE [DISTWIDTH] IN BLOOD BY AUTOMATED COUNT: 12.7 % (ref 12.3–15.4)
GFR SERPL CREATININE-BSD FRML MDRD: 65 ML/MIN/1.73
GLOBULIN UR ELPH-MCNC: 3 GM/DL
GLUCOSE BLD-MCNC: 387 MG/DL (ref 65–99)
GLUCOSE BLDC GLUCOMTR-MCNC: 291 MG/DL (ref 70–130)
GLUCOSE UR STRIP-MCNC: ABNORMAL MG/DL
HBA1C MFR BLD: 12.6 % (ref 4.8–5.6)
HCG SERPL QL: NEGATIVE
HCO3 BLDA-SCNC: 17.9 MMOL/L (ref 22–26)
HCT VFR BLD AUTO: 39.2 % (ref 34–46.6)
HCT VFR BLD CALC: 38 % (ref 37–47)
HGB BLD-MCNC: 13.5 G/DL (ref 12–15.9)
HGB BLDA-MCNC: 13 G/DL (ref 12–16)
HGB UR QL STRIP.AUTO: NEGATIVE
HOLD SPECIMEN: NORMAL
HOROWITZ INDEX BLD+IHG-RTO: 21 %
IMM GRANULOCYTES # BLD AUTO: 0.02 10*3/MM3 (ref 0–0.05)
IMM GRANULOCYTES NFR BLD AUTO: 0.3 % (ref 0–0.5)
KETONES UR QL STRIP: ABNORMAL
LEUKOCYTE ESTERASE UR QL STRIP.AUTO: NEGATIVE
LIPASE SERPL-CCNC: 14 U/L (ref 13–60)
LYMPHOCYTES # BLD AUTO: 1.99 10*3/MM3 (ref 0.7–3.1)
LYMPHOCYTES NFR BLD AUTO: 26.1 % (ref 19.6–45.3)
MCH RBC QN AUTO: 30.8 PG (ref 26.6–33)
MCHC RBC AUTO-ENTMCNC: 34.4 G/DL (ref 31.5–35.7)
MCV RBC AUTO: 89.3 FL (ref 79–97)
METHADONE UR QL SCN: NEGATIVE
METHGB BLD QL: 0.3 % (ref 0–3)
MODALITY: ABNORMAL
MONOCYTES # BLD AUTO: 0.44 10*3/MM3 (ref 0.1–0.9)
MONOCYTES NFR BLD AUTO: 5.8 % (ref 5–12)
NEUTROPHILS # BLD AUTO: 4.82 10*3/MM3 (ref 1.4–7)
NEUTROPHILS NFR BLD AUTO: 63.2 % (ref 42.7–76)
NITRITE UR QL STRIP: NEGATIVE
OPIATES UR QL: NEGATIVE
OXYCODONE UR QL SCN: NEGATIVE
OXYHGB MFR BLDV: 96.9 % (ref 85–100)
PCO2 BLDA: 28.9 MM HG (ref 35–45)
PCP UR QL SCN: NEGATIVE
PH BLDA: 7.41 PH UNITS (ref 7.35–7.45)
PH UR STRIP.AUTO: <=5 [PH] (ref 5–8)
PLATELET # BLD AUTO: 231 10*3/MM3 (ref 140–450)
PMV BLD AUTO: 12.8 FL (ref 6–12)
PO2 BLDA: 125.5 MM HG (ref 80–100)
POTASSIUM BLD-SCNC: 3.8 MMOL/L (ref 3.5–5.2)
PROT SERPL-MCNC: 7 G/DL (ref 6–8.5)
PROT UR QL STRIP: NEGATIVE
RBC # BLD AUTO: 4.39 10*6/MM3 (ref 3.77–5.28)
SAO2 % BLDCOA: 98.3 % (ref 90–100)
SODIUM BLD-SCNC: 138 MMOL/L (ref 136–145)
SP GR UR STRIP: >1.03 (ref 1–1.03)
UROBILINOGEN UR QL STRIP: ABNORMAL
WBC NRBC COR # BLD: 7.62 10*3/MM3 (ref 3.4–10.8)
WHOLE BLOOD HOLD SPECIMEN: NORMAL

## 2019-03-28 PROCEDURE — 85025 COMPLETE CBC W/AUTO DIFF WBC: CPT | Performed by: EMERGENCY MEDICINE

## 2019-03-28 PROCEDURE — 83036 HEMOGLOBIN GLYCOSYLATED A1C: CPT | Performed by: EMERGENCY MEDICINE

## 2019-03-28 PROCEDURE — 80307 DRUG TEST PRSMV CHEM ANLYZR: CPT | Performed by: EMERGENCY MEDICINE

## 2019-03-28 PROCEDURE — 83690 ASSAY OF LIPASE: CPT | Performed by: EMERGENCY MEDICINE

## 2019-03-28 PROCEDURE — 36600 WITHDRAWAL OF ARTERIAL BLOOD: CPT | Performed by: EMERGENCY MEDICINE

## 2019-03-28 PROCEDURE — 96360 HYDRATION IV INFUSION INIT: CPT

## 2019-03-28 PROCEDURE — 81003 URINALYSIS AUTO W/O SCOPE: CPT | Performed by: EMERGENCY MEDICINE

## 2019-03-28 PROCEDURE — 99283 EMERGENCY DEPT VISIT LOW MDM: CPT

## 2019-03-28 PROCEDURE — 80053 COMPREHEN METABOLIC PANEL: CPT | Performed by: EMERGENCY MEDICINE

## 2019-03-28 PROCEDURE — 82962 GLUCOSE BLOOD TEST: CPT

## 2019-03-28 PROCEDURE — 82805 BLOOD GASES W/O2 SATURATION: CPT | Performed by: EMERGENCY MEDICINE

## 2019-03-28 PROCEDURE — 84703 CHORIONIC GONADOTROPIN ASSAY: CPT | Performed by: EMERGENCY MEDICINE

## 2019-03-28 PROCEDURE — 82375 ASSAY CARBOXYHB QUANT: CPT | Performed by: EMERGENCY MEDICINE

## 2019-03-28 PROCEDURE — 83050 HGB METHEMOGLOBIN QUAN: CPT | Performed by: EMERGENCY MEDICINE

## 2019-03-28 RX ORDER — SODIUM CHLORIDE 0.9 % (FLUSH) 0.9 %
10 SYRINGE (ML) INJECTION AS NEEDED
Status: DISCONTINUED | OUTPATIENT
Start: 2019-03-28 | End: 2019-03-29 | Stop reason: HOSPADM

## 2019-03-28 RX ORDER — SODIUM CHLORIDE 9 MG/ML
125 INJECTION, SOLUTION INTRAVENOUS CONTINUOUS
Status: DISCONTINUED | OUTPATIENT
Start: 2019-03-28 | End: 2019-03-29 | Stop reason: HOSPADM

## 2019-03-28 RX ADMIN — SODIUM CHLORIDE 125 ML/HR: 9 INJECTION, SOLUTION INTRAVENOUS at 21:00

## 2019-03-28 RX ADMIN — SODIUM CHLORIDE 1000 ML: 9 INJECTION, SOLUTION INTRAVENOUS at 21:00

## 2019-03-29 LAB — GLUCOSE BLDC GLUCOMTR-MCNC: 334 MG/DL (ref 70–130)

## 2019-04-19 ENCOUNTER — APPOINTMENT (OUTPATIENT)
Dept: GENERAL RADIOLOGY | Facility: HOSPITAL | Age: 44
End: 2019-04-19

## 2019-04-19 ENCOUNTER — HOSPITAL ENCOUNTER (EMERGENCY)
Facility: HOSPITAL | Age: 44
Discharge: HOME | End: 2019-04-19
Attending: EMERGENCY MEDICINE | Admitting: EMERGENCY MEDICINE

## 2019-04-19 VITALS
SYSTOLIC BLOOD PRESSURE: 120 MMHG | BODY MASS INDEX: 34.55 KG/M2 | HEART RATE: 110 BPM | TEMPERATURE: 98.8 F | OXYGEN SATURATION: 97 % | RESPIRATION RATE: 16 BRPM | HEIGHT: 66 IN | DIASTOLIC BLOOD PRESSURE: 72 MMHG | WEIGHT: 215 LBS

## 2019-04-19 DIAGNOSIS — E11.65 UNCONTROLLED TYPE 2 DIABETES MELLITUS WITH HYPERGLYCEMIA (HCC): Primary | ICD-10-CM

## 2019-04-19 LAB
A-A DO2: 26.7 MMHG (ref 0–300)
ACETONE BLD QL: NEGATIVE
ALBUMIN SERPL-MCNC: 4.09 G/DL (ref 3.5–5.2)
ALBUMIN/GLOB SERPL: 1.3 G/DL
ALP SERPL-CCNC: 120 U/L (ref 39–117)
ALT SERPL W P-5'-P-CCNC: 8 U/L (ref 1–33)
ANION GAP SERPL CALCULATED.3IONS-SCNC: 16.2 MMOL/L
ARTERIAL PATENCY WRIST A: POSITIVE
AST SERPL-CCNC: 10 U/L (ref 1–32)
ATMOSPHERIC PRESS: 716 MMHG
BASE EXCESS BLDA CALC-SCNC: -2.5 MMOL/L
BASOPHILS # BLD AUTO: 0.04 10*3/MM3 (ref 0–0.2)
BASOPHILS NFR BLD AUTO: 0.5 % (ref 0–1.5)
BDY SITE: ABNORMAL
BILIRUB SERPL-MCNC: 0.4 MG/DL (ref 0.2–1.2)
BILIRUB UR QL STRIP: NEGATIVE
BODY TEMPERATURE: 98.6 C
BUN BLD-MCNC: 14 MG/DL (ref 6–20)
BUN/CREAT SERPL: 14.6 (ref 7–25)
CALCIUM SPEC-SCNC: 9.6 MG/DL (ref 8.6–10.5)
CHLORIDE SERPL-SCNC: 94 MMOL/L (ref 98–107)
CLARITY UR: CLEAR
CO2 SERPL-SCNC: 22.8 MMOL/L (ref 22–29)
COHGB MFR BLD: 1.7 % (ref 0–5)
COLOR UR: YELLOW
CREAT BLD-MCNC: 0.96 MG/DL (ref 0.57–1)
DEPRECATED RDW RBC AUTO: 37.3 FL (ref 37–54)
EOSINOPHIL # BLD AUTO: 0.34 10*3/MM3 (ref 0–0.4)
EOSINOPHIL NFR BLD AUTO: 3.9 % (ref 0.3–6.2)
ERYTHROCYTE [DISTWIDTH] IN BLOOD BY AUTOMATED COUNT: 12.5 % (ref 12.3–15.4)
GFR SERPL CREATININE-BSD FRML MDRD: 63 ML/MIN/1.73
GLOBULIN UR ELPH-MCNC: 3.1 GM/DL
GLUCOSE BLD-MCNC: 497 MG/DL (ref 65–99)
GLUCOSE BLDC GLUCOMTR-MCNC: 468 MG/DL (ref 70–130)
GLUCOSE UR STRIP-MCNC: ABNORMAL MG/DL
HBA1C MFR BLD: 13.7 % (ref 4.8–5.6)
HCO3 BLDA-SCNC: 21.1 MMOL/L (ref 22–26)
HCT VFR BLD AUTO: 38.6 % (ref 34–46.6)
HCT VFR BLD CALC: 40 % (ref 37–47)
HGB BLD-MCNC: 13.7 G/DL (ref 12–15.9)
HGB BLDA-MCNC: 13.7 G/DL (ref 12–16)
HGB UR QL STRIP.AUTO: NEGATIVE
HOROWITZ INDEX BLD+IHG-RTO: 21 %
IMM GRANULOCYTES # BLD AUTO: 0.04 10*3/MM3 (ref 0–0.05)
IMM GRANULOCYTES NFR BLD AUTO: 0.5 % (ref 0–0.5)
KETONES UR QL STRIP: ABNORMAL
LEUKOCYTE ESTERASE UR QL STRIP.AUTO: NEGATIVE
LIPASE SERPL-CCNC: 18 U/L (ref 13–60)
LYMPHOCYTES # BLD AUTO: 2.05 10*3/MM3 (ref 0.7–3.1)
LYMPHOCYTES NFR BLD AUTO: 23.8 % (ref 19.6–45.3)
MCH RBC QN AUTO: 30.1 PG (ref 26.6–33)
MCHC RBC AUTO-ENTMCNC: 35.5 G/DL (ref 31.5–35.7)
MCV RBC AUTO: 84.8 FL (ref 79–97)
METHGB BLD QL: 0.1 % (ref 0–3)
MODALITY: ABNORMAL
MONOCYTES # BLD AUTO: 0.48 10*3/MM3 (ref 0.1–0.9)
MONOCYTES NFR BLD AUTO: 5.6 % (ref 5–12)
NEUTROPHILS # BLD AUTO: 5.68 10*3/MM3 (ref 1.7–7)
NEUTROPHILS NFR BLD AUTO: 65.7 % (ref 42.7–76)
NITRITE UR QL STRIP: NEGATIVE
NOTE: ABNORMAL
OXYHGB MFR BLDV: 93.4 % (ref 85–100)
PCO2 BLDA: 33 MM HG (ref 35–45)
PCO2 TEMP ADJ BLD: ABNORMAL MM HG (ref 35–45)
PH BLDA: 7.42 PH UNITS (ref 7.35–7.45)
PH UR STRIP.AUTO: 5.5 [PH] (ref 5–8)
PH, TEMP CORRECTED: ABNORMAL PH UNITS (ref 7.35–7.45)
PLATELET # BLD AUTO: 217 10*3/MM3 (ref 140–450)
PMV BLD AUTO: 12.4 FL (ref 6–12)
PO2 BLDA: 74.3 MM HG (ref 80–100)
PO2 TEMP ADJ BLD: ABNORMAL MM HG (ref 83–108)
POTASSIUM BLD-SCNC: 4.2 MMOL/L (ref 3.5–5.2)
PROT SERPL-MCNC: 7.2 G/DL (ref 6–8.5)
PROT UR QL STRIP: NEGATIVE
RBC # BLD AUTO: 4.55 10*6/MM3 (ref 3.77–5.28)
SAO2 % BLDCOA: 95.1 % (ref 90–100)
SODIUM BLD-SCNC: 133 MMOL/L (ref 136–145)
SP GR UR STRIP: >1.03 (ref 1–1.03)
TROPONIN T SERPL-MCNC: <0.01 NG/ML (ref 0–0.03)
UROBILINOGEN UR QL STRIP: ABNORMAL
WBC NRBC COR # BLD: 8.63 10*3/MM3 (ref 3.4–10.8)

## 2019-04-19 PROCEDURE — 83930 ASSAY OF BLOOD OSMOLALITY: CPT | Performed by: PHYSICIAN ASSISTANT

## 2019-04-19 PROCEDURE — 83690 ASSAY OF LIPASE: CPT | Performed by: PHYSICIAN ASSISTANT

## 2019-04-19 PROCEDURE — 82962 GLUCOSE BLOOD TEST: CPT

## 2019-04-19 PROCEDURE — 71045 X-RAY EXAM CHEST 1 VIEW: CPT | Performed by: RADIOLOGY

## 2019-04-19 PROCEDURE — 71045 X-RAY EXAM CHEST 1 VIEW: CPT

## 2019-04-19 PROCEDURE — 82375 ASSAY CARBOXYHB QUANT: CPT | Performed by: PHYSICIAN ASSISTANT

## 2019-04-19 PROCEDURE — 83050 HGB METHEMOGLOBIN QUAN: CPT | Performed by: PHYSICIAN ASSISTANT

## 2019-04-19 PROCEDURE — 96374 THER/PROPH/DIAG INJ IV PUSH: CPT

## 2019-04-19 PROCEDURE — 63710000001 INSULIN ASPART PER 5 UNITS: Performed by: PHYSICIAN ASSISTANT

## 2019-04-19 PROCEDURE — 82805 BLOOD GASES W/O2 SATURATION: CPT | Performed by: PHYSICIAN ASSISTANT

## 2019-04-19 PROCEDURE — 82009 KETONE BODYS QUAL: CPT | Performed by: PHYSICIAN ASSISTANT

## 2019-04-19 PROCEDURE — 36600 WITHDRAWAL OF ARTERIAL BLOOD: CPT | Performed by: PHYSICIAN ASSISTANT

## 2019-04-19 PROCEDURE — 80053 COMPREHEN METABOLIC PANEL: CPT | Performed by: PHYSICIAN ASSISTANT

## 2019-04-19 PROCEDURE — 83036 HEMOGLOBIN GLYCOSYLATED A1C: CPT | Performed by: PHYSICIAN ASSISTANT

## 2019-04-19 PROCEDURE — 85025 COMPLETE CBC W/AUTO DIFF WBC: CPT | Performed by: PHYSICIAN ASSISTANT

## 2019-04-19 PROCEDURE — 84484 ASSAY OF TROPONIN QUANT: CPT | Performed by: PHYSICIAN ASSISTANT

## 2019-04-19 PROCEDURE — 93005 ELECTROCARDIOGRAM TRACING: CPT | Performed by: PHYSICIAN ASSISTANT

## 2019-04-19 PROCEDURE — 99284 EMERGENCY DEPT VISIT MOD MDM: CPT

## 2019-04-19 PROCEDURE — 93010 ELECTROCARDIOGRAM REPORT: CPT | Performed by: INTERNAL MEDICINE

## 2019-04-19 PROCEDURE — 81003 URINALYSIS AUTO W/O SCOPE: CPT | Performed by: PHYSICIAN ASSISTANT

## 2019-04-19 PROCEDURE — 25010000002 ONDANSETRON PER 1 MG: Performed by: PHYSICIAN ASSISTANT

## 2019-04-19 RX ORDER — ONDANSETRON 2 MG/ML
4 INJECTION INTRAMUSCULAR; INTRAVENOUS ONCE
Status: COMPLETED | OUTPATIENT
Start: 2019-04-19 | End: 2019-04-19

## 2019-04-19 RX ORDER — SODIUM CHLORIDE 0.9 % (FLUSH) 0.9 %
10 SYRINGE (ML) INJECTION AS NEEDED
Status: DISCONTINUED | OUTPATIENT
Start: 2019-04-19 | End: 2019-04-19 | Stop reason: HOSPADM

## 2019-04-19 RX ADMIN — INSULIN ASPART 12 UNITS: 100 INJECTION, SOLUTION INTRAVENOUS; SUBCUTANEOUS at 18:28

## 2019-04-19 RX ADMIN — SODIUM CHLORIDE 1000 ML: 9 INJECTION, SOLUTION INTRAVENOUS at 16:28

## 2019-04-19 RX ADMIN — ONDANSETRON 4 MG: 2 INJECTION, SOLUTION INTRAMUSCULAR; INTRAVENOUS at 16:27

## 2019-04-20 LAB
GLUCOSE BLDC GLUCOMTR-MCNC: 335 MG/DL (ref 70–130)
OSMOLALITY SERPL: 305 MOSM/KG (ref 275–300)

## 2019-04-26 ENCOUNTER — HOSPITAL ENCOUNTER (EMERGENCY)
Facility: HOSPITAL | Age: 44
Discharge: HOME | End: 2019-04-27
Attending: EMERGENCY MEDICINE | Admitting: EMERGENCY MEDICINE

## 2019-04-26 DIAGNOSIS — B34.9 VIRAL ILLNESS: Primary | ICD-10-CM

## 2019-04-26 LAB
FLUAV AG NPH QL: NEGATIVE
FLUBV AG NPH QL IA: NEGATIVE
S PYO AG THROAT QL: NEGATIVE

## 2019-04-26 PROCEDURE — 87081 CULTURE SCREEN ONLY: CPT | Performed by: EMERGENCY MEDICINE

## 2019-04-26 PROCEDURE — 99283 EMERGENCY DEPT VISIT LOW MDM: CPT

## 2019-04-26 PROCEDURE — 87804 INFLUENZA ASSAY W/OPTIC: CPT | Performed by: EMERGENCY MEDICINE

## 2019-04-26 PROCEDURE — 93005 ELECTROCARDIOGRAM TRACING: CPT | Performed by: PHYSICIAN ASSISTANT

## 2019-04-26 PROCEDURE — 87880 STREP A ASSAY W/OPTIC: CPT | Performed by: EMERGENCY MEDICINE

## 2019-04-26 RX ORDER — SODIUM CHLORIDE 0.9 % (FLUSH) 0.9 %
10 SYRINGE (ML) INJECTION AS NEEDED
Status: DISCONTINUED | OUTPATIENT
Start: 2019-04-26 | End: 2019-04-27 | Stop reason: HOSPADM

## 2019-04-26 RX ORDER — ONDANSETRON 2 MG/ML
4 INJECTION INTRAMUSCULAR; INTRAVENOUS ONCE
Status: COMPLETED | OUTPATIENT
Start: 2019-04-26 | End: 2019-04-27

## 2019-04-27 ENCOUNTER — APPOINTMENT (OUTPATIENT)
Dept: GENERAL RADIOLOGY | Facility: HOSPITAL | Age: 44
End: 2019-04-27

## 2019-04-27 VITALS
HEART RATE: 96 BPM | BODY MASS INDEX: 34.55 KG/M2 | WEIGHT: 215 LBS | SYSTOLIC BLOOD PRESSURE: 123 MMHG | HEIGHT: 66 IN | DIASTOLIC BLOOD PRESSURE: 89 MMHG | TEMPERATURE: 98.7 F | RESPIRATION RATE: 16 BRPM | OXYGEN SATURATION: 100 %

## 2019-04-27 LAB
ANION GAP SERPL CALCULATED.3IONS-SCNC: 12.3 MMOL/L
BASOPHILS # BLD AUTO: 0.04 10*3/MM3 (ref 0–0.2)
BASOPHILS NFR BLD AUTO: 0.5 % (ref 0–1.5)
BUN BLD-MCNC: 16 MG/DL (ref 6–20)
BUN/CREAT SERPL: 19.8 (ref 7–25)
CALCIUM SPEC-SCNC: 8.7 MG/DL (ref 8.6–10.5)
CHLORIDE SERPL-SCNC: 105 MMOL/L (ref 98–107)
CO2 SERPL-SCNC: 25.7 MMOL/L (ref 22–29)
CREAT BLD-MCNC: 0.81 MG/DL (ref 0.57–1)
DEPRECATED RDW RBC AUTO: 42.7 FL (ref 37–54)
EOSINOPHIL # BLD AUTO: 0.33 10*3/MM3 (ref 0–0.4)
EOSINOPHIL NFR BLD AUTO: 4.5 % (ref 0.3–6.2)
ERYTHROCYTE [DISTWIDTH] IN BLOOD BY AUTOMATED COUNT: 13 % (ref 12.3–15.4)
GFR SERPL CREATININE-BSD FRML MDRD: 77 ML/MIN/1.73
GLUCOSE BLD-MCNC: 104 MG/DL (ref 65–99)
HCT VFR BLD AUTO: 36.4 % (ref 34–46.6)
HGB BLD-MCNC: 12.1 G/DL (ref 12–15.9)
IMM GRANULOCYTES # BLD AUTO: 0.04 10*3/MM3 (ref 0–0.05)
IMM GRANULOCYTES NFR BLD AUTO: 0.5 % (ref 0–0.5)
LYMPHOCYTES # BLD AUTO: 2.16 10*3/MM3 (ref 0.7–3.1)
LYMPHOCYTES NFR BLD AUTO: 29.4 % (ref 19.6–45.3)
MCH RBC QN AUTO: 30.5 PG (ref 26.6–33)
MCHC RBC AUTO-ENTMCNC: 33.2 G/DL (ref 31.5–35.7)
MCV RBC AUTO: 91.7 FL (ref 79–97)
MONOCYTES # BLD AUTO: 0.61 10*3/MM3 (ref 0.1–0.9)
MONOCYTES NFR BLD AUTO: 8.3 % (ref 5–12)
NEUTROPHILS # BLD AUTO: 4.17 10*3/MM3 (ref 1.7–7)
NEUTROPHILS NFR BLD AUTO: 56.8 % (ref 42.7–76)
NT-PROBNP SERPL-MCNC: 86.4 PG/ML (ref 5–450)
PLATELET # BLD AUTO: 238 10*3/MM3 (ref 140–450)
PMV BLD AUTO: 12 FL (ref 6–12)
POTASSIUM BLD-SCNC: 4 MMOL/L (ref 3.5–5.2)
RBC # BLD AUTO: 3.97 10*6/MM3 (ref 3.77–5.28)
SODIUM BLD-SCNC: 143 MMOL/L (ref 136–145)
TROPONIN T SERPL-MCNC: <0.01 NG/ML (ref 0–0.03)
WBC NRBC COR # BLD: 7.35 10*3/MM3 (ref 3.4–10.8)

## 2019-04-27 PROCEDURE — 80048 BASIC METABOLIC PNL TOTAL CA: CPT | Performed by: PHYSICIAN ASSISTANT

## 2019-04-27 PROCEDURE — 93010 ELECTROCARDIOGRAM REPORT: CPT | Performed by: INTERNAL MEDICINE

## 2019-04-27 PROCEDURE — 85025 COMPLETE CBC W/AUTO DIFF WBC: CPT | Performed by: PHYSICIAN ASSISTANT

## 2019-04-27 PROCEDURE — 25010000002 ONDANSETRON PER 1 MG: Performed by: PHYSICIAN ASSISTANT

## 2019-04-27 PROCEDURE — 71046 X-RAY EXAM CHEST 2 VIEWS: CPT | Performed by: RADIOLOGY

## 2019-04-27 PROCEDURE — 96374 THER/PROPH/DIAG INJ IV PUSH: CPT

## 2019-04-27 PROCEDURE — 71046 X-RAY EXAM CHEST 2 VIEWS: CPT

## 2019-04-27 PROCEDURE — 83880 ASSAY OF NATRIURETIC PEPTIDE: CPT | Performed by: PHYSICIAN ASSISTANT

## 2019-04-27 PROCEDURE — 84484 ASSAY OF TROPONIN QUANT: CPT | Performed by: PHYSICIAN ASSISTANT

## 2019-04-27 RX ADMIN — SODIUM CHLORIDE 1000 ML: 9 INJECTION, SOLUTION INTRAVENOUS at 00:00

## 2019-04-27 RX ADMIN — ONDANSETRON 4 MG: 2 INJECTION, SOLUTION INTRAMUSCULAR; INTRAVENOUS at 00:00

## 2019-04-28 LAB — BACTERIA SPEC AEROBE CULT: NORMAL

## 2019-05-23 ENCOUNTER — TELEPHONE (OUTPATIENT)
Dept: GASTROENTEROLOGY | Facility: CLINIC | Age: 44
End: 2019-05-23

## 2019-05-23 ENCOUNTER — PATIENT MESSAGE (OUTPATIENT)
Dept: GASTROENTEROLOGY | Facility: CLINIC | Age: 44
End: 2019-05-23

## 2019-05-24 ENCOUNTER — TELEPHONE (OUTPATIENT)
Dept: GASTROENTEROLOGY | Facility: CLINIC | Age: 44
End: 2019-05-24

## 2019-05-24 NOTE — TELEPHONE ENCOUNTER
Regarding: Prescription Question  Contact: [phone]  ----- Message from Mychart, Generic sent at 5/23/2019  4:55 PM EDT -----    This one is by prescription only. I dont sleep of a night die to stomach pain   ----- Message -----  From: Dianna Walters PA-C  Sent: 5/23/2019  4:42 PM EDT  To: Marquita Vernon  Subject: RE: Prescription Question  Sure, you can take probiotics of any kind. They can be purchased over the counter.     ----- Message -----     From: Marquita Vernon     Sent: 5/23/2019  3:49 PM EDT       To: Dianna Walters PA-C  Subject: Prescription Question    I saw on tv a new probolic that works of a night where ppl can sleep an I a candidate for it.

## 2019-05-30 ENCOUNTER — TELEPHONE (OUTPATIENT)
Dept: GASTROENTEROLOGY | Facility: CLINIC | Age: 44
End: 2019-05-30

## 2019-05-30 NOTE — TELEPHONE ENCOUNTER
Regarding: Prescription Question  Contact: [phone]  ----- Message from Mychart, Generic sent at 5/30/2019  2:33 PM EDT -----    Peptiva is the name. I can come in any time after Tuesday   ----- Message -----  From: Dianna Walters PA-C  Sent: 5/30/2019  9:54 AM EDT  To: Marquita Vernon  Subject: RE: Prescription Question  I'm not sure which probiotic you are speaking of, if you can get me the name, we can see if it is covered by your insurance. Also, consider arranging appointment so that we can discuss your ongoing GI problems in more detail and get you feeling better. Thanks.    ----- Message -----     From: Marquita Vrenon     Sent: 5/23/2019  4:55 PM EDT       To: Dianna Walters PA-C  Subject: Prescription Question    This one is by prescription only. I dont sleep of a night die to stomach pain   ----- Message -----  From: Dianna Walters PA-C  Sent: 5/23/2019  4:42 PM EDT  To: Marquita Vernon  Subject: RE: Prescription Question  Sure, you can take probiotics of any kind. They can be purchased over the counter.     ----- Message -----     From: Marquita Vernon     Sent: 5/23/2019  3:49 PM EDT       To: Dianna Walters PA-C  Subject: Prescription Question    I saw on tv a new probolic that works of a night where ppl can sleep an I a candidate for it.

## 2019-05-30 NOTE — TELEPHONE ENCOUNTER
Please arrange pt appt to discuss. I checked on Peptivia and it is an over the counter medication.

## 2019-06-07 ENCOUNTER — PATIENT MESSAGE (OUTPATIENT)
Dept: GASTROENTEROLOGY | Facility: CLINIC | Age: 44
End: 2019-06-07

## 2019-06-07 ENCOUNTER — OFFICE VISIT (OUTPATIENT)
Dept: GASTROENTEROLOGY | Facility: CLINIC | Age: 44
End: 2019-06-07

## 2019-06-07 ENCOUNTER — TELEPHONE (OUTPATIENT)
Dept: GASTROENTEROLOGY | Facility: CLINIC | Age: 44
End: 2019-06-07

## 2019-06-07 VITALS — HEIGHT: 66 IN | BODY MASS INDEX: 36.58 KG/M2 | WEIGHT: 227.6 LBS | OXYGEN SATURATION: 98 %

## 2019-06-07 DIAGNOSIS — R15.9 INCONTINENCE OF FECES WITH FECAL URGENCY: ICD-10-CM

## 2019-06-07 DIAGNOSIS — K58.0 IRRITABLE BOWEL SYNDROME WITH DIARRHEA: Primary | ICD-10-CM

## 2019-06-07 DIAGNOSIS — R15.2 INCONTINENCE OF FECES WITH FECAL URGENCY: ICD-10-CM

## 2019-06-07 PROCEDURE — 99214 OFFICE O/P EST MOD 30 MIN: CPT | Performed by: PHYSICIAN ASSISTANT

## 2019-06-07 RX ORDER — SACCHAROMYCES BOULARDII 250 MG
250 CAPSULE ORAL DAILY
Qty: 30 CAPSULE | Refills: 5 | Status: SHIPPED | OUTPATIENT
Start: 2019-06-07 | End: 2019-08-20

## 2019-06-07 NOTE — TELEPHONE ENCOUNTER
Regarding: Prescription Question  Contact: [phone]  ----- Message from [facility], Generic sent at 6/7/2019 10:37 AM EDT -----    What was the name of the drink at [employer]   ----- Message -----  From: Dianna Walters PA-C  Sent: 5/30/2019  9:54 AM EDT  To: Marquita Vernon  Subject: RE: Prescription Question  I'm not sure which probiotic you are speaking of, if you can get me the name, we can see if it is covered by your insurance. Also, consider arranging appointment so that we can discuss your ongoing GI problems in more detail and get you feeling better. Thanks.    ----- Message -----     From: Marquita Vernon     Sent: 5/23/2019  4:55 PM EDT       To: Dianna Walters PA-C  Subject: Prescription Question    This one is by prescription only. I dont sleep of a night die to stomach pain   ----- Message -----  From: Dianna Walters PA-C  Sent: 5/23/2019  4:42 PM EDT  To: Marquita Vernon  Subject: RE: Prescription Question  Sure, you can take probiotics of any kind. They can be purchased over the counter.     ----- Message -----     From: Marquita Vernon     Sent: 5/23/2019  3:49 PM EDT       To: Dianna Walters PA-C  Subject: Prescription Question    I saw on tv a new probolic that works of a night where ppl can sleep an I a candidate for it.

## 2019-06-07 NOTE — PROGRESS NOTES
: 1975    Chief Complaint   Patient presents with   • Diarrhea       Marquita Vernon is a 44 y.o. female who presents to the office today as a follow up appointment regarding Diarrhea.    History of Present Illness:  Patient reports that over the past couple of weeks, she has developed severe diarrhea. She will have fecal incontinence in her sleep and is worried about going to sleep because it is so severe. She wonders about a probiotic that she saw on a commercial that also helps with sleep, Peptivia. She is having 3-4 diarrhea stools per day. She took Alinia in the past for SIBO which did help. Flagyl has been taken in the past without relief. She has had diarrhea for a while now but it has been more severe recently. Last EGD and colonoscopy was in 2018 and showed normal colon.     Review of Systems   Constitutional: Positive for fatigue.   HENT: Negative for trouble swallowing.    Eyes: Negative.    Respiratory: Negative.    Cardiovascular: Negative for chest pain.   Gastrointestinal: Positive for abdominal pain, diarrhea and nausea. Negative for abdominal distention, anal bleeding, blood in stool, constipation, rectal pain and vomiting.   Endocrine: Negative for heat intolerance.   Genitourinary: Negative.    Musculoskeletal: Positive for arthralgias, back pain and myalgias.   Skin: Negative.    Allergic/Immunologic: Positive for environmental allergies. Negative for food allergies.   Neurological: Negative for dizziness and light-headedness.   Hematological: Bruises/bleeds easily.   Psychiatric/Behavioral: Positive for sleep disturbance. The patient is not nervous/anxious.        Past Medical History:   Diagnosis Date   • Abnormal ECG    • Arthritis    • Asthma    • Diabetes mellitus (CMS/HCC)    • GERD (gastroesophageal reflux disease)    • IBS (irritable bowel syndrome)    • Pancreatitis 2017   • Scoliosis        Past Surgical History:   Procedure Laterality Date   • CARDIOVASCULAR STRESS TEST   11/27/2018    L. Cardiolite- Unable to walk. EF 69%. Negative.   • CHOLECYSTECTOMY     • COLONOSCOPY     • COLONOSCOPY N/A 9/21/2018    Procedure: COLONOSCOPY CPT CODE: 28439;  Surgeon: Micah Swartz MD;  Location: [facility];  Service: Gastroenterology   • CYST REMOVAL     • ECHO - CONVERTED  11/27/2018    EF 65%. No AS. Mild MR   • ENDOSCOPY     • ENDOSCOPY N/A 9/21/2018    Procedure: ESOPHAGOGASTRODUODENOSCOPY WITH BIOPSY CPT CODE: 52164;  Surgeon: Micah Swartz MD;  Location: [facility];  Service: Gastroenterology   • OTHER SURGICAL HISTORY  01/02/2019    Event monitor- baseline sinus, one 39 beat SVT, no V-tach, no AFib, no pauses   • TUBAL ABDOMINAL LIGATION     • UPPER GASTROINTESTINAL ENDOSCOPY  2015 2018       Family History   Problem Relation Age of Onset   • Diabetes Other    • Cancer Other    • Lung cancer Other    • Heart attack Father         MI in his late 40's   • No Known Problems Sister        Social History     Socioeconomic History   • Marital status:      Spouse name: Not on file   • Number of children: Not on file   • Years of education: Not on file   • Highest education level: Not on file   Tobacco Use   • Smoking status: Never Smoker   • Smokeless tobacco: Never Used   Substance and Sexual Activity   • Alcohol use: No   • Drug use: No   • Sexual activity: Yes     Partners: Male     Birth control/protection: Surgical       Current Outpatient Medications:   •  Cholecalciferol (VITAMIN D3) 5000 units capsule capsule, Take 5,000 Units by mouth Daily., Disp: , Rfl:   •  insulin aspart (novoLOG FLEXPEN) 100 UNIT/ML solution pen-injector sc pen, Inject  under the skin 3 (Three) Times a Day With Meals., Disp: , Rfl:   •  insulin glargine (LANTUS) 100 UNIT/ML injection, Inject  under the skin Daily., Disp: , Rfl:   •  Loratadine (CLARITIN) 10 MG capsule, Take  by mouth Daily., Disp: , Rfl:   •  metoprolol tartrate (LOPRESSOR) 25 MG tablet, Take 1 tablet by mouth 2 (Two) Times  "a Day., Disp: 180 tablet, Rfl: 3  •  Multiple Vitamins-Minerals (MULTIVITAMIN ADULT PO), Take  by mouth Daily., Disp: , Rfl:   •  omeprazole (priLOSEC) 20 MG capsule, Take 1 capsule by mouth 2 (Two) Times a Day Before Meals. (Patient taking differently: Take 20 mg by mouth Daily.), Disp: 20 capsule, Rfl: 0  •  PARoxetine (PAXIL) 10 MG tablet, Take 10 mg by mouth Every Morning., Disp: , Rfl:   •  raNITIdine (ZANTAC) 300 MG tablet, Take 1 tablet by mouth 2 (Two) Times a Day., Disp: 28 tablet, Rfl: 0  •  tiZANidine (ZANAFLEX) 4 MG tablet, Take 4 mg by mouth Every 6 (Six) Hours As Needed for Muscle Spasms., Disp: , Rfl:       Allergies:   Ibuprofen; Latex; and Vaseline [petrolatum]    Vitals:  Ht 167.6 cm (66\")   Wt 103 kg (227 lb 9.6 oz)   SpO2 98%   BMI 36.74 kg/m²     Physical Exam   Constitutional: She is oriented to person, place, and time. She appears well-developed and well-nourished. No distress.   HENT:   Head: Normocephalic and atraumatic.   Nose: Nose normal.   Mouth/Throat: Oropharynx is clear and moist.   Eyes: Conjunctivae are normal. Right eye exhibits no discharge. Left eye exhibits no discharge. No scleral icterus.   Neck: Normal range of motion. No JVD present.   Cardiovascular: Normal rate, regular rhythm and normal heart sounds. Exam reveals no gallop and no friction rub.   No murmur heard.  Pulmonary/Chest: Effort normal and breath sounds normal. No respiratory distress. She has no wheezes. She has no rales. She exhibits no tenderness.   Abdominal: Soft. Bowel sounds are normal. She exhibits no mass. There is tenderness (mild, generalized).   Musculoskeletal: Normal range of motion. She exhibits no edema or deformity.   Neurological: She is alert and oriented to person, place, and time. Coordination normal.   Skin: Skin is warm and dry. No rash noted. She is not diaphoretic. No erythema.   Psychiatric: She has a normal mood and affect. Her behavior is normal. Judgment and thought content normal. "   Vitals reviewed.      Assessment/Plan:  1. Irritable bowel syndrome with diarrhea    2. Incontinence of feces with fecal urgency      I have asked her to take Xifaxan therapy for treatment of IBS-D. Samples were given today because her insurance does not cover it. She will also add daily probiotic to her regimen. She could eat yogurt or drink Kefir smoothie drink as well. Call with concerns.     New Medications Ordered This Visit   Medications   • rifaximin (XIFAXAN) 550 MG tablet     Sig: Take 1 tablet by mouth 3 (Three) Times a Day.     Dispense:  42 tablet     Refill:  2   • saccharomyces boulardii (FLORASTOR) 250 MG capsule     Sig: Take 1 capsule by mouth Daily.     Dispense:  30 capsule     Refill:  5           Return in about 6 weeks (around 7/19/2019) for recheck diarrhea.      Electronically signed 6/7/2019 11:06 AM  Dianna Walters PA-C, [facility]

## 2019-06-24 ENCOUNTER — TELEPHONE (OUTPATIENT)
Dept: GASTROENTEROLOGY | Facility: CLINIC | Age: 44
End: 2019-06-24

## 2019-06-24 NOTE — TELEPHONE ENCOUNTER
Patients insurance denied Xifaxan I was not aware that a reply was scanned into chart. Denial states that they want her to try 2 of the following for 30 days in the past 90 days:    Loperamide, dicyclomine, OR amitriptyline, desipramine, and doxepin.     Denial is scanned into chart.

## 2019-08-20 ENCOUNTER — OFFICE VISIT (OUTPATIENT)
Dept: GASTROENTEROLOGY | Facility: CLINIC | Age: 44
End: 2019-08-20

## 2019-08-20 VITALS
HEIGHT: 66 IN | BODY MASS INDEX: 37.61 KG/M2 | SYSTOLIC BLOOD PRESSURE: 120 MMHG | WEIGHT: 234 LBS | OXYGEN SATURATION: 98 % | HEART RATE: 101 BPM | DIASTOLIC BLOOD PRESSURE: 90 MMHG

## 2019-08-20 DIAGNOSIS — K58.0 IRRITABLE BOWEL SYNDROME WITH DIARRHEA: Primary | ICD-10-CM

## 2019-08-20 PROCEDURE — 99213 OFFICE O/P EST LOW 20 MIN: CPT | Performed by: PHYSICIAN ASSISTANT

## 2019-08-20 NOTE — PROGRESS NOTES
: 1975    Chief Complaint   Patient presents with   • Diarrhea       Marquita Vernon is a 44 y.o. female who presents to the office today as a follow up appointment regarding Diarrhea.    History of Present Illness:  Today, she states that she is feeling better.  Diarrhea is still present but occurring 1-2 times per day.  Sometimes her stools are more formed but mostly they are loose.  Denies any rectal bleeding.  Still having some abdominal gas pains.  Nausea is present and intermittent but mild.  She relates most of her nausea currently to sinus drainage.  She is still taking probiotic that she bought over-the-counter twice daily, helping with nighttime abdominal pain.  She completed treatment with Xifaxan for irritable bowel syndrome with diarrhea and noticed improvement after that.    Review of Systems   Constitutional: Positive for fatigue.   HENT: Negative for trouble swallowing.    Eyes: Negative.    Respiratory: Negative.    Cardiovascular: Negative for chest pain.   Gastrointestinal: Positive for abdominal pain, diarrhea and nausea. Negative for abdominal distention, anal bleeding, blood in stool, constipation, rectal pain and vomiting.   Endocrine: Negative for heat intolerance.   Genitourinary: Negative.    Musculoskeletal: Positive for arthralgias, back pain and myalgias.   Skin: Negative.    Allergic/Immunologic: Positive for environmental allergies. Negative for food allergies.   Neurological: Negative for dizziness and light-headedness.   Hematological: Bruises/bleeds easily.   Psychiatric/Behavioral: Positive for sleep disturbance. The patient is not nervous/anxious.        Past Medical History:   Diagnosis Date   • Abnormal ECG    • Arthritis    • Asthma    • Diabetes mellitus (CMS/HCC)    • GERD (gastroesophageal reflux disease)    • IBS (irritable bowel syndrome)    • Pancreatitis 2017   • Scoliosis        Past Surgical History:   Procedure Laterality Date   • CARDIOVASCULAR STRESS TEST   11/27/2018    L. Cardiolite- Unable to walk. EF 69%. Negative.   • CHOLECYSTECTOMY     • COLONOSCOPY     • COLONOSCOPY N/A 9/21/2018    Procedure: COLONOSCOPY CPT CODE: 87015;  Surgeon: Micah Swartz MD;  Location: [facility];  Service: Gastroenterology   • CYST REMOVAL     • ECHO - CONVERTED  11/27/2018    EF 65%. No AS. Mild MR   • ENDOSCOPY     • ENDOSCOPY N/A 9/21/2018    Procedure: ESOPHAGOGASTRODUODENOSCOPY WITH BIOPSY CPT CODE: 00360;  Surgeon: Micah Swartz MD;  Location: [facility];  Service: Gastroenterology   • OTHER SURGICAL HISTORY  01/02/2019    Event monitor- baseline sinus, one 39 beat SVT, no V-tach, no AFib, no pauses   • TUBAL ABDOMINAL LIGATION     • UPPER GASTROINTESTINAL ENDOSCOPY  2015 2018       Family History   Problem Relation Age of Onset   • Diabetes Other    • Cancer Other    • Lung cancer Other    • Heart attack Father         MI in his late 40's   • No Known Problems Sister        Social History     Socioeconomic History   • Marital status:      Spouse name: Not on file   • Number of children: Not on file   • Years of education: Not on file   • Highest education level: Not on file   Tobacco Use   • Smoking status: Never Smoker   • Smokeless tobacco: Never Used   Substance and Sexual Activity   • Alcohol use: No   • Drug use: No   • Sexual activity: Yes     Partners: Male     Birth control/protection: Surgical       Current Outpatient Medications:   •  Cholecalciferol (VITAMIN D3) 5000 units capsule capsule, Take 5,000 Units by mouth Daily., Disp: , Rfl:   •  insulin aspart (novoLOG FLEXPEN) 100 UNIT/ML solution pen-injector sc pen, Inject  under the skin 3 (Three) Times a Day With Meals., Disp: , Rfl:   •  insulin glargine (LANTUS) 100 UNIT/ML injection, Inject  under the skin Daily., Disp: , Rfl:   •  Loratadine (CLARITIN) 10 MG capsule, Take  by mouth Daily., Disp: , Rfl:   •  metoprolol tartrate (LOPRESSOR) 25 MG tablet, Take 1 tablet by mouth 2 (Two) Times  "a Day., Disp: 180 tablet, Rfl: 3  •  Multiple Vitamins-Minerals (MULTIVITAMIN ADULT PO), Take  by mouth Daily., Disp: , Rfl:   •  omeprazole (priLOSEC) 20 MG capsule, Take 1 capsule by mouth 2 (Two) Times a Day Before Meals. (Patient taking differently: Take 20 mg by mouth Daily.), Disp: 20 capsule, Rfl: 0  •  PARoxetine (PAXIL) 10 MG tablet, Take 10 mg by mouth Every Morning., Disp: , Rfl:   •  raNITIdine (ZANTAC) 300 MG tablet, Take 1 tablet by mouth 2 (Two) Times a Day., Disp: 28 tablet, Rfl: 0  •  rifaximin (XIFAXAN) 550 MG tablet, Take 1 tablet by mouth 3 (Three) Times a Day., Disp: 42 tablet, Rfl: 2  •  saccharomyces boulardii (FLORASTOR) 250 MG capsule, Take 1 capsule by mouth Daily., Disp: 30 capsule, Rfl: 5  •  tiZANidine (ZANAFLEX) 4 MG tablet, Take 4 mg by mouth Every 6 (Six) Hours As Needed for Muscle Spasms., Disp: , Rfl:     Allergies:   Ibuprofen; Latex; and Vaseline [petrolatum]    Vitals:  /90 (BP Location: Left arm, Patient Position: Sitting, Cuff Size: Adult)   Pulse 101   Ht 167.6 cm (66\")   Wt 106 kg (234 lb)   SpO2 98%   BMI 37.77 kg/m²     Physical Exam   Constitutional: She is oriented to person, place, and time. She appears well-developed and well-nourished. No distress.   HENT:   Head: Normocephalic and atraumatic.   Nose: Nose normal.   Mouth/Throat: Oropharynx is clear and moist.   Eyes: Conjunctivae are normal. Right eye exhibits no discharge. Left eye exhibits no discharge. No scleral icterus.   Neck: Normal range of motion. No JVD present.   Cardiovascular: Normal rate, regular rhythm and normal heart sounds. Exam reveals no gallop and no friction rub.   No murmur heard.  Pulmonary/Chest: Effort normal and breath sounds normal. No respiratory distress. She has no wheezes. She has no rales. She exhibits no tenderness.   Abdominal: Soft. Bowel sounds are normal. She exhibits no mass. There is no tenderness.   Musculoskeletal: Normal range of motion. She exhibits no edema or " deformity.   Neurological: She is alert and oriented to person, place, and time. Coordination normal.   Skin: Skin is warm and dry. No rash noted. She is not diaphoretic. No erythema.   Psychiatric: She has a normal mood and affect. Her behavior is normal. Judgment and thought content normal.   Vitals reviewed.      Assessment:  1. Irritable bowel syndrome with diarrhea      Plan:  Call with concerns. Continue to monitor stools and take probiotic daily.         Return in about 6 months (around 2/20/2020) for recheck IBS.      Electronically signed 8/20/2019 4:43 PM  Dianna Walters PA-C, [facility]

## 2020-04-09 ENCOUNTER — PATIENT MESSAGE (OUTPATIENT)
Dept: GASTROENTEROLOGY | Facility: CLINIC | Age: 45
End: 2020-04-09

## 2020-04-09 DIAGNOSIS — R19.7 DIARRHEA, UNSPECIFIED TYPE: Primary | ICD-10-CM

## 2020-04-10 RX ORDER — L.ACID,PARA/B.BIFIDUM/S.THERM 8B CELL
1 CAPSULE ORAL DAILY
Qty: 90 CAPSULE | Refills: 3 | Status: SHIPPED | OUTPATIENT
Start: 2020-04-10

## 2020-04-10 NOTE — TELEPHONE ENCOUNTER
From: Marquita Vernon  To: Dianna Walters PA-C  Sent: 4/9/2020 8:53 PM EDT  Subject: Prescription Question    With the coronavirus I have not been able to find the probolicts, can you please prescribe some thing

## 2021-03-23 ENCOUNTER — BULK ORDERING (OUTPATIENT)
Dept: CASE MANAGEMENT | Facility: OTHER | Age: 46
End: 2021-03-23

## 2021-03-23 DIAGNOSIS — Z23 IMMUNIZATION DUE: ICD-10-CM

## 2022-03-19 ENCOUNTER — APPOINTMENT (OUTPATIENT)
Dept: WOMENS IMAGING | Facility: HOSPITAL | Age: 47
End: 2022-03-19

## 2022-03-19 PROCEDURE — 77063 BREAST TOMOSYNTHESIS BI: CPT | Performed by: RADIOLOGY

## 2022-03-19 PROCEDURE — 77067 SCR MAMMO BI INCL CAD: CPT | Performed by: RADIOLOGY

## 2022-08-13 ENCOUNTER — APPOINTMENT (OUTPATIENT)
Dept: MRI IMAGING | Facility: HOSPITAL | Age: 47
End: 2022-08-13

## 2022-08-13 ENCOUNTER — APPOINTMENT (OUTPATIENT)
Dept: GENERAL RADIOLOGY | Facility: HOSPITAL | Age: 47
End: 2022-08-13

## 2022-08-13 ENCOUNTER — HOSPITAL ENCOUNTER (EMERGENCY)
Facility: HOSPITAL | Age: 47
Discharge: HOME | End: 2022-08-13
Attending: STUDENT IN AN ORGANIZED HEALTH CARE EDUCATION/TRAINING PROGRAM | Admitting: STUDENT IN AN ORGANIZED HEALTH CARE EDUCATION/TRAINING PROGRAM

## 2022-08-13 VITALS
SYSTOLIC BLOOD PRESSURE: 107 MMHG | DIASTOLIC BLOOD PRESSURE: 86 MMHG | OXYGEN SATURATION: 98 % | WEIGHT: 224 LBS | HEART RATE: 90 BPM | RESPIRATION RATE: 18 BRPM | BODY MASS INDEX: 36 KG/M2 | TEMPERATURE: 97.5 F | HEIGHT: 66 IN

## 2022-08-13 DIAGNOSIS — G45.4 TGA (TRANSIENT GLOBAL AMNESIA): Primary | ICD-10-CM

## 2022-08-13 LAB
ALBUMIN SERPL-MCNC: 4.41 G/DL (ref 3.5–5.2)
ALBUMIN/GLOB SERPL: 1.4 G/DL
ALP SERPL-CCNC: 86 U/L (ref 39–117)
ALT SERPL W P-5'-P-CCNC: 19 U/L (ref 1–33)
ANION GAP SERPL CALCULATED.3IONS-SCNC: 11.6 MMOL/L (ref 5–15)
APTT PPP: 29.2 SECONDS (ref 26.5–34.5)
AST SERPL-CCNC: 24 U/L (ref 1–32)
BASOPHILS # BLD AUTO: 0.07 10*3/MM3 (ref 0–0.2)
BASOPHILS NFR BLD AUTO: 0.7 % (ref 0–1.5)
BILIRUB SERPL-MCNC: 0.4 MG/DL (ref 0–1.2)
BUN SERPL-MCNC: 20 MG/DL (ref 6–20)
BUN/CREAT SERPL: 16.5 (ref 7–25)
CALCIUM SPEC-SCNC: 9.9 MG/DL (ref 8.6–10.5)
CHLORIDE SERPL-SCNC: 108 MMOL/L (ref 98–107)
CO2 SERPL-SCNC: 24.4 MMOL/L (ref 22–29)
CREAT SERPL-MCNC: 1.21 MG/DL (ref 0.57–1)
DEPRECATED RDW RBC AUTO: 44.8 FL (ref 37–54)
EGFRCR SERPLBLD CKD-EPI 2021: 55.7 ML/MIN/1.73
EOSINOPHIL # BLD AUTO: 0.23 10*3/MM3 (ref 0–0.4)
EOSINOPHIL NFR BLD AUTO: 2.4 % (ref 0.3–6.2)
ERYTHROCYTE [DISTWIDTH] IN BLOOD BY AUTOMATED COUNT: 13.4 % (ref 12.3–15.4)
FLUAV RNA RESP QL NAA+PROBE: NOT DETECTED
FLUBV RNA RESP QL NAA+PROBE: NOT DETECTED
GLOBULIN UR ELPH-MCNC: 3.1 GM/DL
GLUCOSE BLDC GLUCOMTR-MCNC: 76 MG/DL (ref 70–130)
GLUCOSE SERPL-MCNC: 75 MG/DL (ref 65–99)
HCT VFR BLD AUTO: 39.4 % (ref 34–46.6)
HGB BLD-MCNC: 12.5 G/DL (ref 12–15.9)
HOLD SPECIMEN: NORMAL
HOLD SPECIMEN: NORMAL
IMM GRANULOCYTES # BLD AUTO: 0.04 10*3/MM3 (ref 0–0.05)
IMM GRANULOCYTES NFR BLD AUTO: 0.4 % (ref 0–0.5)
INR PPP: 0.92 (ref 0.9–1.1)
LYMPHOCYTES # BLD AUTO: 1.12 10*3/MM3 (ref 0.7–3.1)
LYMPHOCYTES NFR BLD AUTO: 11.9 % (ref 19.6–45.3)
MCH RBC QN AUTO: 29.1 PG (ref 26.6–33)
MCHC RBC AUTO-ENTMCNC: 31.7 G/DL (ref 31.5–35.7)
MCV RBC AUTO: 91.8 FL (ref 79–97)
MONOCYTES # BLD AUTO: 0.42 10*3/MM3 (ref 0.1–0.9)
MONOCYTES NFR BLD AUTO: 4.5 % (ref 5–12)
NEUTROPHILS NFR BLD AUTO: 7.54 10*3/MM3 (ref 1.7–7)
NEUTROPHILS NFR BLD AUTO: 80.1 % (ref 42.7–76)
NRBC BLD AUTO-RTO: 0 /100 WBC (ref 0–0.2)
PLATELET # BLD AUTO: 237 10*3/MM3 (ref 140–450)
PMV BLD AUTO: 11.8 FL (ref 6–12)
POTASSIUM SERPL-SCNC: 5 MMOL/L (ref 3.5–5.2)
PROT SERPL-MCNC: 7.5 G/DL (ref 6–8.5)
PROTHROMBIN TIME: 12.6 SECONDS (ref 12.1–14.7)
RBC # BLD AUTO: 4.29 10*6/MM3 (ref 3.77–5.28)
SARS-COV-2 RNA RESP QL NAA+PROBE: DETECTED
SODIUM SERPL-SCNC: 144 MMOL/L (ref 136–145)
WBC NRBC COR # BLD: 9.42 10*3/MM3 (ref 3.4–10.8)
WHOLE BLOOD HOLD COAG: NORMAL
WHOLE BLOOD HOLD SPECIMEN: NORMAL

## 2022-08-13 PROCEDURE — 71045 X-RAY EXAM CHEST 1 VIEW: CPT

## 2022-08-13 PROCEDURE — 70544 MR ANGIOGRAPHY HEAD W/O DYE: CPT

## 2022-08-13 PROCEDURE — 70551 MRI BRAIN STEM W/O DYE: CPT

## 2022-08-13 PROCEDURE — 87636 SARSCOV2 & INF A&B AMP PRB: CPT | Performed by: STUDENT IN AN ORGANIZED HEALTH CARE EDUCATION/TRAINING PROGRAM

## 2022-08-13 PROCEDURE — 80053 COMPREHEN METABOLIC PANEL: CPT | Performed by: STUDENT IN AN ORGANIZED HEALTH CARE EDUCATION/TRAINING PROGRAM

## 2022-08-13 PROCEDURE — 85610 PROTHROMBIN TIME: CPT | Performed by: STUDENT IN AN ORGANIZED HEALTH CARE EDUCATION/TRAINING PROGRAM

## 2022-08-13 PROCEDURE — 99284 EMERGENCY DEPT VISIT MOD MDM: CPT

## 2022-08-13 PROCEDURE — 25010000002 KETOROLAC TROMETHAMINE PER 15 MG: Performed by: STUDENT IN AN ORGANIZED HEALTH CARE EDUCATION/TRAINING PROGRAM

## 2022-08-13 PROCEDURE — 85025 COMPLETE CBC W/AUTO DIFF WBC: CPT | Performed by: STUDENT IN AN ORGANIZED HEALTH CARE EDUCATION/TRAINING PROGRAM

## 2022-08-13 PROCEDURE — 82962 GLUCOSE BLOOD TEST: CPT

## 2022-08-13 PROCEDURE — 93010 ELECTROCARDIOGRAM REPORT: CPT | Performed by: INTERNAL MEDICINE

## 2022-08-13 PROCEDURE — 93005 ELECTROCARDIOGRAM TRACING: CPT | Performed by: STUDENT IN AN ORGANIZED HEALTH CARE EDUCATION/TRAINING PROGRAM

## 2022-08-13 PROCEDURE — 85730 THROMBOPLASTIN TIME PARTIAL: CPT | Performed by: STUDENT IN AN ORGANIZED HEALTH CARE EDUCATION/TRAINING PROGRAM

## 2022-08-13 PROCEDURE — 70547 MR ANGIOGRAPHY NECK W/O DYE: CPT

## 2022-08-13 PROCEDURE — 25010000002 PROCHLORPERAZINE 10 MG/2ML SOLUTION: Performed by: STUDENT IN AN ORGANIZED HEALTH CARE EDUCATION/TRAINING PROGRAM

## 2022-08-13 PROCEDURE — 96375 TX/PRO/DX INJ NEW DRUG ADDON: CPT

## 2022-08-13 PROCEDURE — 25010000002 DIPHENHYDRAMINE PER 50 MG: Performed by: STUDENT IN AN ORGANIZED HEALTH CARE EDUCATION/TRAINING PROGRAM

## 2022-08-13 PROCEDURE — 96374 THER/PROPH/DIAG INJ IV PUSH: CPT

## 2022-08-13 RX ORDER — SODIUM CHLORIDE 0.9 % (FLUSH) 0.9 %
10 SYRINGE (ML) INJECTION AS NEEDED
Status: DISCONTINUED | OUTPATIENT
Start: 2022-08-13 | End: 2022-08-13 | Stop reason: HOSPADM

## 2022-08-13 RX ORDER — DIPHENHYDRAMINE HYDROCHLORIDE 50 MG/ML
25 INJECTION INTRAMUSCULAR; INTRAVENOUS ONCE AS NEEDED
Status: COMPLETED | OUTPATIENT
Start: 2022-08-13 | End: 2022-08-13

## 2022-08-13 RX ORDER — KETOROLAC TROMETHAMINE 30 MG/ML
15 INJECTION, SOLUTION INTRAMUSCULAR; INTRAVENOUS ONCE
Status: COMPLETED | OUTPATIENT
Start: 2022-08-13 | End: 2022-08-13

## 2022-08-13 RX ORDER — PROCHLORPERAZINE EDISYLATE 5 MG/ML
5 INJECTION INTRAMUSCULAR; INTRAVENOUS ONCE AS NEEDED
Status: COMPLETED | OUTPATIENT
Start: 2022-08-13 | End: 2022-08-13

## 2022-08-13 RX ADMIN — PROCHLORPERAZINE EDISYLATE 5 MG: 5 INJECTION INTRAMUSCULAR; INTRAVENOUS at 17:15

## 2022-08-13 RX ADMIN — KETOROLAC TROMETHAMINE 15 MG: 30 INJECTION, SOLUTION INTRAMUSCULAR at 17:18

## 2022-08-13 RX ADMIN — SODIUM CHLORIDE 1000 ML: 9 INJECTION, SOLUTION INTRAVENOUS at 15:11

## 2022-08-13 RX ADMIN — DIPHENHYDRAMINE HYDROCHLORIDE 25 MG: 50 INJECTION INTRAMUSCULAR; INTRAVENOUS at 17:17

## 2022-08-13 NOTE — DISCHARGE INSTRUCTIONS
Please follow-up with your primary care provider at the next available appointment.  Drink plenty of fluids, rest, avoid heat and dehydration.

## 2022-08-13 NOTE — ED PROVIDER NOTES
[facility]  emergency department encounter        Pt Name: Marquita Vernon  MRN: [id number]  Birthdate 1975  Date of evaluation: 8/13/2022    Chief Complaint   Patient presents with   • Altered Mental Status   • Fatigue   • Dizziness     C/o onset of symptoms after mowing the yard today. States she doesn't remember coming in after mowing the yard.    • Weakness - Generalized   • Headache             HISTORY OF PRESENT ILLNESS:   Marquita Vernon is a 47 y.o. female PMH significant for DM, GERD, IBS, pancreatitis.    Patient presents for transient confusion.  Patient was mowing the lawn outside in the heat.  She came inside from mowing the lawn but does not recall doing so.  At that time she had had fatigue dizziness generalized weakness that have subsequently resolved.  Only symptom on arrival is headache.  She is fully alert and oriented at this time.  Family present in room and reports patient denies nausea vomiting numbness tingling weakness or any focal complaints.      Patient had viral symptom onset 14 days ago, diagnosed with COVID 10 days ago, symptoms predominantly resolved.      No other exacerbating or alleviating factors other than as noted above.  Severity: Severe    PCP: Lan Guerra APRN          REVIEW OF SYSTEMS:     Review of Systems   Constitutional: Negative for fever.   HENT: Negative for congestion and rhinorrhea.    Eyes: Negative for visual disturbance.   Respiratory: Negative for cough and shortness of breath.    Cardiovascular: Negative for chest pain.   Gastrointestinal: Negative for abdominal pain, nausea and vomiting.   Genitourinary: Negative for dysuria.   Musculoskeletal: Negative for myalgias.   Skin: Negative for rash.   Neurological: Positive for headaches.   Psychiatric/Behavioral: Positive for confusion.       Review of systems otherwise as per HPI.          PREVIOUS HISTORY:         Past Medical History:   Diagnosis Date   • Abnormal ECG    • Arthritis    •  Asthma    • Diabetes mellitus (CMS/HCC)    • GERD (gastroesophageal reflux disease)    • IBS (irritable bowel syndrome)    • Pancreatitis 2017   • Scoliosis            Past Surgical History:   Procedure Laterality Date   • CARDIOVASCULAR STRESS TEST  11/27/2018    L. Cardiolite- Unable to walk. EF 69%. Negative.   • CHOLECYSTECTOMY     • COLONOSCOPY     • COLONOSCOPY N/A 9/21/2018    Procedure: COLONOSCOPY CPT CODE: 85248;  Surgeon: Micah Swartz MD;  Location: [facility];  Service: Gastroenterology   • CYST REMOVAL     • ECHO - CONVERTED  11/27/2018    EF 65%. No AS. Mild MR   • ENDOSCOPY     • ENDOSCOPY N/A 9/21/2018    Procedure: ESOPHAGOGASTRODUODENOSCOPY WITH BIOPSY CPT CODE: 47755;  Surgeon: Micah Swartz MD;  Location: [facility];  Service: Gastroenterology   • OTHER SURGICAL HISTORY  01/02/2019    Event monitor- baseline sinus, one 39 beat SVT, no V-tach, no AFib, no pauses   • TUBAL ABDOMINAL LIGATION     • UPPER GASTROINTESTINAL ENDOSCOPY  2015 2018           Social History     Socioeconomic History   • Marital status:    Tobacco Use   • Smoking status: Never Smoker   • Smokeless tobacco: Never Used   Substance and Sexual Activity   • Alcohol use: No   • Drug use: No   • Sexual activity: Yes     Partners: Male     Birth control/protection: Surgical           Family History   Problem Relation Age of Onset   • Diabetes Other    • Cancer Other    • Lung cancer Other    • Heart attack Father         MI in his late 40's   • No Known Problems Sister          Current Outpatient Medications   Medication Instructions   • insulin aspart (novoLOG FLEXPEN) 100 UNIT/ML solution pen-injector sc pen Subcutaneous, 3 Times Daily With Meals   • insulin glargine (LANTUS) 100 UNIT/ML injection Subcutaneous, Daily   • lactobacillus acidophilus (RISAQUAD) capsule capsule 1 capsule, Oral, Daily   • Loratadine (CLARITIN) 10 MG capsule Oral, Daily   • metoprolol tartrate (LOPRESSOR) 25 mg, Oral, 2 Times  Daily   • Multiple Vitamins-Minerals (MULTIVITAMIN ADULT PO) Oral, Daily   • omeprazole (PRILOSEC) 20 mg, Oral, 2 Times Daily Before Meals   • PARoxetine (PAXIL) 10 mg, Oral, Every Morning   • raNITIdine (ZANTAC) 300 mg, Oral, 2 Times Daily   • tiZANidine (ZANAFLEX) 4 mg, Oral, Every 6 Hours PRN   • vitamin D3 5,000 Units, Oral, Daily         Allergies:  Ibuprofen, Latex, and Vaseline [petrolatum]    Medications, allergies and past medical, surgical, family, and social history reviewed.        PHYSICAL EXAM:     Physical Exam  Vitals and nursing note reviewed.   Constitutional:       General: She is not in acute distress.  HENT:      Head: Normocephalic and atraumatic.   Eyes:      Extraocular Movements: Extraocular movements intact.      Conjunctiva/sclera: Conjunctivae normal.   Cardiovascular:      Rate and Rhythm: Normal rate and regular rhythm.   Pulmonary:      Effort: Pulmonary effort is normal. No respiratory distress.   Abdominal:      General: Abdomen is flat. There is no distension.   Musculoskeletal:         General: No deformity. Normal range of motion.      Cervical back: Normal range of motion. No rigidity.   Neurological:      General: No focal deficit present.      Mental Status: She is alert and oriented to person, place, and time.      Comments: Patient alert and fully oriented including to age and month.  Following commands well, blink and  intact.  Extraocular to movement intact without nystagmus, visual fields full.  No facial asymmetry on smile.  Repetition intact, object naming intact.  No drift in any extremity.  Sensation intact light touch x4 without extinction.  No ataxia on HTS or FTN.   Psychiatric:         Mood and Affect: Mood normal.         Behavior: Behavior normal.             COMPLETED DIAGNOSTIC STUDIES AND INTERVENTIONS:     Lab Results (last 24 hours)     Procedure Component Value Units Date/Time    COVID PRE-OP / PRE-PROCEDURE SCREENING ORDER (NO ISOLATION) - Swab,  Nasopharynx [[id number]]  (Abnormal) Collected: 08/13/22 1453    Specimen: Swab from Nasopharynx Updated: 08/13/22 1533    Narrative:      The following orders were created for panel order COVID PRE-OP / PRE-PROCEDURE SCREENING ORDER (NO ISOLATION) - Swab, Nasopharynx.  Procedure                               Abnormality         Status                     ---------                               -----------         ------                     COVID-19 and FLU A/B PCR...[[id number]]  Abnormal            Final result                 Please view results for these tests on the individual orders.    COVID-19 and FLU A/B PCR - Swab, Nasopharynx [[id number]]  (Abnormal) Collected: 08/13/22 1453    Specimen: Swab from Nasopharynx Updated: 08/13/22 1533     COVID19 Detected     Influenza A PCR Not Detected     Influenza B PCR Not Detected    Narrative:      Fact sheet for providers: https://www.fda.gov/media/104546/download    Fact sheet for patients: https://www.fda.gov/media/951744/download    Test performed by PCR.  Influenza A and Influenza B negative results should be considered presumptive in samples that have a positive SARS-CoV-2 result.    Competitive inhibition studies showed that SARS-CoV-2 virus, when present at concentrations above 3.6E+04 copies/mL, can inhibit the detection and amplification of influenza A and influenza B virus RNA if present at or below 1.8E+02 copies/mL or 4.9E+02 copies/mL, respectively, and may lead to false negative influenza virus results. If co-infection with influenza A or influenza B virus is suspected in samples with a positive SARS-CoV-2 result, the sample should be re-tested with another FDA cleared, approved, or authorized influenza test, if influenza virus detection would change clinical management.    Comprehensive Metabolic Panel [[id number]]  (Abnormal) Collected: 08/13/22 1503    Specimen: Blood Updated: 08/13/22 1539     Glucose 75 mg/dL      BUN 20 mg/dL      Creatinine 1.21  mg/dL      Sodium 144 mmol/L      Potassium 5.0 mmol/L      Comment: Slight hemolysis detected by analyzer. Results may be affected.        Chloride 108 mmol/L      CO2 24.4 mmol/L      Calcium 9.9 mg/dL      Total Protein 7.5 g/dL      Albumin 4.41 g/dL      ALT (SGPT) 19 U/L      AST (SGOT) 24 U/L      Alkaline Phosphatase 86 U/L      Total Bilirubin 0.4 mg/dL      Globulin 3.1 gm/dL      A/G Ratio 1.4 g/dL      BUN/Creatinine Ratio 16.5     Anion Gap 11.6 mmol/L      eGFR 55.7 mL/min/1.73      Comment: National Kidney Foundation and American Society of Nephrology (ASN) Task Force recommended calculation based on the Chronic Kidney Disease Epidemiology Collaboration (CKD-EPI) equation refit without adjustment for race.       Narrative:      GFR Normal >60  Chronic Kidney Disease <60  Kidney Failure <15      CBC & Differential [[id number]]  (Abnormal) Collected: 08/13/22 1503    Specimen: Blood Updated: 08/13/22 1513    Narrative:      The following orders were created for panel order CBC & Differential.  Procedure                               Abnormality         Status                     ---------                               -----------         ------                     CBC Auto Differential[[id number]]        Abnormal            Final result                 Please view results for these tests on the individual orders.    Protime-INR [[id number]]  (Normal) Collected: 08/13/22 1503    Specimen: Blood Updated: 08/13/22 1522     Protime 12.6 Seconds      INR 0.92    Narrative:      Suggested INR therapeutic range for stable oral anticoagulant therapy:    Low Intensity therapy:   1.5-2.0  Moderate Intensity therapy:   2.0-3.0  High Intensity therapy:   2.5-4.0    aPTT [[id number]]  (Normal) Collected: 08/13/22 1503    Specimen: Blood Updated: 08/13/22 1522     PTT 29.2 seconds     Narrative:      PTT Heparin Therapeutic Range:  59 - 95 seconds      CBC Auto Differential [[id number]]  (Abnormal) Collected: 08/13/22  1503    Specimen: Blood Updated: 08/13/22 1513     WBC 9.42 10*3/mm3      RBC 4.29 10*6/mm3      Hemoglobin 12.5 g/dL      Hematocrit 39.4 %      MCV 91.8 fL      MCH 29.1 pg      MCHC 31.7 g/dL      RDW 13.4 %      RDW-SD 44.8 fl      MPV 11.8 fL      Platelets 237 10*3/mm3      Neutrophil % 80.1 %      Lymphocyte % 11.9 %      Monocyte % 4.5 %      Eosinophil % 2.4 %      Basophil % 0.7 %      Immature Grans % 0.4 %      Neutrophils, Absolute 7.54 10*3/mm3      Lymphocytes, Absolute 1.12 10*3/mm3      Monocytes, Absolute 0.42 10*3/mm3      Eosinophils, Absolute 0.23 10*3/mm3      Basophils, Absolute 0.07 10*3/mm3      Immature Grans, Absolute 0.04 10*3/mm3      nRBC 0.0 /100 WBC     POC Glucose Once [[id number]]  (Normal) Collected: 08/13/22 1504    Specimen: Blood Updated: 08/13/22 1509     Glucose 76 mg/dL      Comment: Meter: [id number] : [id number] [employer]               XR Chest 1 View   Final Result   No acute cardiopulmonary findings.      Signer Name: Abhilash Huffman MD    Signed: 8/13/2022 5:15 PM    Workstation Name: [facility]     Radiology [facility]      MRI Angiogram Neck Without Contrast   Final Result   Impression:       No evidence of significant plaque formation or flow limiting stenosis demonstrated on this study.         Signer Name: Lan Celestin MD    Signed: 8/13/2022 4:51 PM    [facility]     Radiology [facility]      MRI Angiogram Head Without Contrast   Final Result   Impression:      MRA of the head demonstrating no acute findings         Signer Name: Lan Celestin MD    Signed: 8/13/2022 4:54 PM    [facility]      MRI Brain Without Contrast   Final Result   Impression:   No acute intracranial findings.            Signer Name: Lan Celestin MD    Signed: 8/13/2022 4:50 PM    Workstation [facility]: [facility]     Radiology [facility]  Ordered This Visit   Medications   • sodium chloride 0.9 % bolus 1,000 mL   • sodium chloride 0.9 % flush 10 mL   • prochlorperazine (COMPAZINE) injection 5 mg   • diphenhydrAMINE (BENADRYL) injection 25 mg   • ketorolac (TORADOL) injection 15 mg         Procedures            MEDICAL DECISION-MAKING AND ED COURSE:     ED Course as of 08/13/22 1731   Sat Aug 13, 2022   1503 EKG noted sinus rhythm.  90 bpm.  .  QRS 64.  QTc 439.  No acute ST elevation [SF]   1537 COVID19(!!): Detected [KP]   1544 Creatinine(!): 1.21 [KP]   1608 MDM: 47-year-old female presents for transient memory loss after working outside in the heat.  Diagnosed with COVID 10 days ago, symptom onset 14 days ago.  Had transient dizziness fatigue and only persistent headache on arrival.  NIH score 0.  Discussed with neurology Dr. Barclay who has evaluated.  Patient in MRI. [KP]   1703 MRI brain and MRAs without acute findings.  Discussed with neurology, patient okay to be discharged home and follow-up with her primary care provider. [KP]   1725 XR Chest 1 View  No acute cardiopulmonary findings. [KP]   1731 Headache improved with Compazine Benadryl and Toradol.  Patient reports she feels ready to go home.  Discussed findings and recommendations, patient agreeable to plan all questions answered. [KP]      ED Course User Index  [KP] Avery James MD  [SF] Miguel Angel Ortiz, DO       ?      FINAL IMPRESSION:       1. TGA (transient global amnesia)         The complaints listed here are new problems to this examiner.      FOLLOW-UP  Lan Guerra, APRN  [address]  [facility] [id number] [phone]    Schedule an appointment as soon as possible for a visit         DISPOSITION  ED Disposition     ED Disposition   Discharge    Condition   Stable    Comment   --                   This care is provided during an unprecedented national emergency due to the Novel Coronavirus (COVID-19). COVID-19 infections and transmission risks place heavy  strains on healthcare resources. As this pandemic evolves, the Hospital and providers strive to respond fluidly, to remain operational, and to provide care relative to available resources and information. Outcomes are unpredictable and treatments are without well-defined guidelines. Further, the impact of COVID-19 on all aspects of emergency care, including the impact to patients seeking care for reasons other than COVID-19, is unavoidable during this national emergency.    This note was dictated using a janizo-ic-fopz tool. Occasional wrong-word or 'sound-a-like' substitutions may have occurred due to the inherent limitations of voice recognition software. ?Read the chart carefully and recognize, using context, where substitutions have occurred.    Avery James MD  17:31 EDT  8/13/2022             Avery James MD  08/13/22 1739

## 2022-08-13 NOTE — CONSULTS
"Teleneurology Consultation Note    Date of Consultation: 8/13/2022  Requesting Attending: Avery James MD  Chief Complaint/Reason for consultation: confusional episode   Location: ED   Date/Time Telestroke doctor on video: 8/13/2022  16:00 EDT    History of Present Illness:   Marquita Vernon is a 47 y.o. woman with a history GERD, morbid obesity, DM2, IBS presenting with an episode of confusion.     Ms. Vernon presents with her boyfriend who was with her during this episode.     Ms. Vernon says over the last week she has recovering from COVID19 and a sinus infection. She denies fevers but has experienced cough and significant fatigue. Yesterday, she noted the lawn was getting long so she would try to mow it on her riding mower. She completed some of it but got tired and went inside with plans to finish today. Today, while on the mower she said she suddenly felt like each time she blinked her vision changed, she says it's hard to explain but that things just did not look right, as if going from 3D to 2D. The next thing she remembers she was sitting in her living room with a dog on her lap. Her boyfriend who was present says that Ms. Vernon walked inside from outside and stated, \"what day is it.\" She appeared sweaty and looked unwell. He told her the day and then she repeated multiple times \"what day is it.\" She then asked when the dog olamide came over and the dog has been at their home for many months. Eventually she sat down on the couch and ~10-15 minutes later appeared close to her baseline. Throughout the event no focal weakness, sensory changes, slurred speech or loss of consciousness.     At time of my interview, Ms. Vernon reports feeling back to herself with only residual mild headache and generalized fatigue.     Review of System: All 12 points of review of system has been obtained and pertinent positive mentioned in HPI.   Medical History: Pertinent past medical history, surgical history, family history and " social history has been reviewed.   Allergies: ibuprofen, latex, vaseline     Physical Exam:   Ms. Vernon is awake and alert  She provides all of her own medical history  Comprehension, naming, repetition intact   No dysarthria  Extraocular movements intact  No visual field cut   Face symmetric   No drift in bilateral upper or lower extremities   No drift in lower extremities   Finger nose finger intact  Sensation intact to light touch throughout     MRI on my review without any pathology to explain symptoms - awaiting radiology ready        ##Transient episode of confusion   ##Headache  ##Recent COVID19 infection   Transient episode of confusion without focal neurologic deficits during episode or at present consistent with either global hypoperfusion vs transient global amnesia. The event seems provoked in the setting of heat, activity and recovering from recent illness. At time of interview patient is neurologically intact and no ongoing symptoms. MRI on prelimary review without pathology. If final read is normal and metabolic panel looks good, no further neurologic work up needed at this time. Recommend adequate hydration and rest in the coming days. If symptoms were to recur would recommend return to ED for full cardiovascular work up.        Karlee Barclay MD  If you have any questions about the patient recommendations,   please call [phone] at anytime and ask to speak with the neurologist on call.   Press 1 for an emergent consult and 2 for a non-emergent consult.      Teleneurology Patient Verification & Consent  I have proceeded with this evaluation at the direct request of the referring practitioner as there is felt to be no appropriate specialist available at bedside to perform these evaluations. The [facility] ([facility]) practitioner has reported to me this is the correct patient and has obtained verbal consent from the patient/surrogate to perform his voluntary  telemedicine evaluation (including obtaining history, performing examination and reviewing data provided by the patient and/or originating site care provider). I attest that I introduced myself to the patient, provided my credentials, disclosed my location, and determined that, based on review of the patient's chart and discussion with the patient's primary team, telemedicine via a HIPAA-compliant, real-time, face-to-face, two-way, interactive audio and video platform is an appropriate and effective means of providing this service.  The patient/surrogate has the right to refuse this evaluation as they have been explained risks (including potential loss of confidentiality), benefits, alternatives, and the potential need for subsequent face to face care. Patient/surrogate understands that there is a risk of medical inaccuracies given that our recommendations will be made based on reported data (and we must therefore assume this information is accurate). Knowing that there is a risk that this information is not reported accurately, and that the telemedicine video, audio, or data feed may be incomplete, the patient agrees to proceed with evaluation and holds us harmless knowing these risks. In this evaluation, we will be providing recommendations only. The ultimate decision to follow, or not follow, these recommendations will be left to the bedside treating/requesting practitioner. The patient/surrogate has been notified that other healthcare professionals (including technical personnel) may be involved in this audio-video evaluation. All laws concerning confidentiality and patient access to medical records and copies of medical records apply to telemedicine. The patient/surrogate has received the originating site's Health Notice of Privacy Practices.    Medical Data Reviewed:   1. Data reviewed include clinical labs, radiology, medical test;  2. Obtaining/reviewing old medical records;  3. Obtaining case history from  another source;  4. Independent review of CNS images    I, Karlee Barclay MD, saw patient on 8/13/2022 for an initial in-patient or emergency room telemedicine face-to-face consult using interactive technology.

## 2022-08-15 LAB
QT INTERVAL: 344 MS
QTC INTERVAL: 439 MS

## 2022-09-06 ENCOUNTER — OFFICE VISIT (OUTPATIENT)
Dept: ENDOCRINOLOGY | Facility: CLINIC | Age: 47
End: 2022-09-06

## 2022-09-06 ENCOUNTER — LAB (OUTPATIENT)
Dept: LAB | Facility: HOSPITAL | Age: 47
End: 2022-09-06

## 2022-09-06 VITALS
DIASTOLIC BLOOD PRESSURE: 80 MMHG | BODY MASS INDEX: 41.17 KG/M2 | WEIGHT: 256.2 LBS | HEART RATE: 102 BPM | SYSTOLIC BLOOD PRESSURE: 130 MMHG | HEIGHT: 66 IN | OXYGEN SATURATION: 98 %

## 2022-09-06 DIAGNOSIS — E11.65 TYPE 2 DIABETES MELLITUS WITH HYPERGLYCEMIA, WITH LONG-TERM CURRENT USE OF INSULIN: Primary | ICD-10-CM

## 2022-09-06 DIAGNOSIS — Z79.4 TYPE 2 DIABETES MELLITUS WITH HYPERGLYCEMIA, WITH LONG-TERM CURRENT USE OF INSULIN: Primary | ICD-10-CM

## 2022-09-06 LAB
GLUCOSE BLDC GLUCOMTR-MCNC: 406 MG/DL (ref 70–130)
HBA1C MFR BLD: 8.1 % (ref 4.8–5.6)

## 2022-09-06 PROCEDURE — 83036 HEMOGLOBIN GLYCOSYLATED A1C: CPT | Performed by: NURSE PRACTITIONER

## 2022-09-06 PROCEDURE — 36415 COLL VENOUS BLD VENIPUNCTURE: CPT | Performed by: NURSE PRACTITIONER

## 2022-09-06 PROCEDURE — 82962 GLUCOSE BLOOD TEST: CPT | Performed by: NURSE PRACTITIONER

## 2022-09-06 PROCEDURE — 99204 OFFICE O/P NEW MOD 45 MIN: CPT | Performed by: NURSE PRACTITIONER

## 2022-09-06 RX ORDER — SEMAGLUTIDE 1.34 MG/ML
1 INJECTION, SOLUTION SUBCUTANEOUS WEEKLY
Qty: 3 ML | Refills: 2 | Status: SHIPPED | OUTPATIENT
Start: 2022-09-06 | End: 2022-09-27 | Stop reason: SDUPTHER

## 2022-09-06 NOTE — PROGRESS NOTES
Chief Complaint   Patient presents with   • Diabetes        Referring Provider  Ade Smith AP*     HPI   Marquita Vernon is a 47 y.o. female had concerns including Diabetes.    Seen as a new patient.    She had been seeing [facility] and she can travel here easier for care.  She would like to transfer her care here.    Diabetes was diagnosed 2009.  Complications include neuropathy.  Last ophtho exam was Feb 2022, [facility].  Current medications for diabetes include Lantus 52 units BID, Novolog 22 units PRN-she usually takes twice daily, Ozempic 0.5 mg weekly-has been on this for 6 months.  Past meds: Metformin-GI issues, Januvia-insurance issue  She checks her blood sugar up to 4 times per day. Typically use a Dexcom  Hypos: occasionally, mostly during the afternoon/early evening, or approximately 2 hours after eating a meal.    ACE/ARB:no, Statin: no  Labs:  A1C:8.3 (5/2022)    The following portions of the patient's history were reviewed and updated as appropriate: allergies, current medications, past family history, past medical history, past social history, past surgical history and problem list.    Diet:   Breakfast: doesn't typically eat  Lunch: salad or left overs  Dinner: veggies, meat,   Drinks: diet mtn dew, milk  Snacks: popcorn    Past Medical History:   Diagnosis Date   • Abnormal ECG    • Arthritis    • Asthma    • Diabetes mellitus (HCC)    • GERD (gastroesophageal reflux disease)    • IBS (irritable bowel syndrome)    • Pancreatitis 2017   • Scoliosis      Past Surgical History:   Procedure Laterality Date   • CARDIOVASCULAR STRESS TEST  11/27/2018    L. Cardiolite- Unable to walk. EF 69%. Negative.   • CHOLECYSTECTOMY     • COLONOSCOPY     • COLONOSCOPY N/A 9/21/2018    Procedure: COLONOSCOPY CPT CODE: 25488;  Surgeon: Micah Swartz MD;  Location: [facility];  Service: Gastroenterology   • CYST REMOVAL     • ECHO - CONVERTED  11/27/2018    EF 65%. No AS. Mild MR  "  • ENDOSCOPY     • ENDOSCOPY N/A 9/21/2018    Procedure: ESOPHAGOGASTRODUODENOSCOPY WITH BIOPSY CPT CODE: 61010;  Surgeon: Micah Swartz MD;  Location: [facility];  Service: Gastroenterology   • OTHER SURGICAL HISTORY  01/02/2019    Event monitor- baseline sinus, one 39 beat SVT, no V-tach, no AFib, no pauses   • TUBAL ABDOMINAL LIGATION     • UPPER GASTROINTESTINAL ENDOSCOPY  2015 2018      Family History   Problem Relation Age of Onset   • Diabetes Other    • Cancer Other    • Lung cancer Other    • Heart attack Father         MI in his late 40's   • No Known Problems Sister       Social History     Socioeconomic History   • Marital status:    Tobacco Use   • Smoking status: Never Smoker   • Smokeless tobacco: Never Used   Substance and Sexual Activity   • Alcohol use: No   • Drug use: No   • Sexual activity: Yes     Partners: Male     Birth control/protection: Surgical      Allergies   Allergen Reactions   • Ibuprofen Hives and Other (See Comments)     \"smiley\"   • Latex Hives   • Vaseline [Petrolatum] Hives      Current Outpatient Medications on File Prior to Visit   Medication Sig Dispense Refill   • Cholecalciferol (VITAMIN D3) 5000 units capsule capsule Take 5,000 Units by mouth Daily.     • insulin aspart (novoLOG FLEXPEN) 100 UNIT/ML solution pen-injector sc pen Inject  under the skin 3 (Three) Times a Day With Meals.     • insulin glargine (LANTUS) 100 UNIT/ML injection Inject  under the skin Daily.     • lactobacillus acidophilus (RISAQUAD) capsule capsule Take 1 capsule by mouth Daily. 90 capsule 3   • Loratadine (CLARITIN) 10 MG capsule Take  by mouth Daily.     • metoprolol tartrate (LOPRESSOR) 25 MG tablet Take 1 tablet by mouth 2 (Two) Times a Day. 180 tablet 3   • Multiple Vitamins-Minerals (MULTIVITAMIN ADULT PO) Take  by mouth Daily.     • omeprazole (priLOSEC) 20 MG capsule Take 1 capsule by mouth 2 (Two) Times a Day Before Meals. (Patient taking differently: Take 20 mg by mouth " "Daily.) 20 capsule 0   • PARoxetine (PAXIL) 10 MG tablet Take 10 mg by mouth Every Morning.     • raNITIdine (ZANTAC) 300 MG tablet Take 1 tablet by mouth 2 (Two) Times a Day. 28 tablet 0   • tiZANidine (ZANAFLEX) 4 MG tablet Take 4 mg by mouth Every 6 (Six) Hours As Needed for Muscle Spasms.       No current facility-administered medications on file prior to visit.        Review of Systems   Constitutional: Positive for fatigue. Negative for unexpected weight gain and unexpected weight loss.   Eyes: Negative.    Endocrine: Negative for polydipsia, polyphagia and polyuria.   Psychiatric/Behavioral: Positive for sleep disturbance.   All other systems reviewed and are negative.     /80 (BP Location: Right arm, Patient Position: Sitting, Cuff Size: Large Adult)   Pulse 102   Ht 167.6 cm (66\")   Wt 116 kg (256 lb 3.2 oz)   SpO2 98%   BMI 41.35 kg/m²      Physical Exam  Vitals reviewed.   Constitutional:       Appearance: Normal appearance.   Eyes:      Extraocular Movements: Extraocular movements intact.   Neck:      Thyroid: Thyromegaly present. No thyroid mass or thyroid tenderness.   Cardiovascular:      Rate and Rhythm: Normal rate and regular rhythm.      Pulses: Normal pulses.      Heart sounds: Normal heart sounds.   Pulmonary:      Effort: Pulmonary effort is normal.      Breath sounds: Normal breath sounds.   Skin:     General: Skin is warm and dry.   Neurological:      General: No focal deficit present.      Mental Status: She is alert and oriented to person, place, and time.   Psychiatric:         Mood and Affect: Mood normal.         Behavior: Behavior normal.         Thought Content: Thought content normal.         Judgment: Judgment normal.       CMP:  Lab Results   Component Value Date    BUN 20 08/13/2022    CREATININE 1.21 (H) 08/13/2022    EGFRIFNONA 77 04/27/2019    BCR 16.5 08/13/2022     08/13/2022    K 5.0 08/13/2022    CO2 24.4 08/13/2022    CALCIUM 9.9 08/13/2022    ALBUMIN 4.41 " 08/13/2022    BILITOT 0.4 08/13/2022    ALKPHOS 86 08/13/2022    AST 24 08/13/2022    ALT 19 08/13/2022     Lipid Panel:  No results found for: CHOL, TRIG, HDL, VLDL, LDL  HbA1c:  Lab Results   Component Value Date    HGBA1C 8.6 (A) 09/23/2021    HGBA1C 13.70 (H) 04/19/2019     Glucose:  Lab Results   Component Value Date    POCGLU 406 (A) 09/06/2022     Microalbumin:  No results found for: MALBCRERATIO  TSH:  No results found for: TSH    Assessment and Plan    Diagnoses and all orders for this visit:    1. Type 2 diabetes mellitus with hyperglycemia, with long-term current use of insulin (HCC) (Primary)  Assessment & Plan:  -Diabetes is above goal with most recent A1c 8.3 and elevated BG's.  -Discussed dietary and exercise guidelines with patient and family.  -Discussed the importance of yearly eye exams.  -Discussed the importance of checking BG's regularly.    -Discontinue Novolog since she is only using it PRN.  -Switch Lantus to 75 units qhs.  She is to call the office in a 3-4 days with FSBG's to determine if further adjustment is needed.  -Increase Ozempic 1 mg weekly. Patient has no personal history of pancreatitis, no family history of MEN syndrome or medullary thyroid cancer. Possible side effects including nausea, bloating, other GI upset and rarely pancreatitis were discussed. She was advised to call the office with any symptoms or concerns.   -Start Jardiance 10 mg QD. Discussed the medication's MOA and that it may cause more frequent urination particularly upon starting the medication. Take one tablet in the morning with or without food. Encouraged to drink plenty of water as to not get dehydrated especially in warmer weather or with activity. Also discussed there may be an increased incidence of UTIs or yeast infections and to monitor for signs/symptoms  -S/S hypoglycemia reviewed with Rule of 15's advised.  -Follow-up in 3 months.    Orders:  -     POC Glucose Fingerstick  -     Hemoglobin  A1c    Other orders  -     empagliflozin (Jardiance) 10 MG tablet tablet; Take 1 tablet by mouth Daily.  Dispense: 30 tablet; Refill: 3  -     Semaglutide, 1 MG/DOSE, (Ozempic, 1 MG/DOSE,) 4 MG/3ML solution pen-injector; Inject 1 mg under the skin into the appropriate area as directed 1 (One) Time Per Week.  Dispense: 3 mL; Refill: 2       Return in about 3 months (around 12/6/2022) for Follow-up appointment, A1C. The patient was instructed to contact the clinic with any interval questions or concerns.        This document has been electronically signed by ALIE Gonzalez  September 6, 2022 12:48 EDT   Endocrinology

## 2022-09-06 NOTE — ASSESSMENT & PLAN NOTE
-Diabetes is above goal with most recent A1c 8.3 and elevated BG's.  -Discussed dietary and exercise guidelines with patient and family.  -Discussed the importance of yearly eye exams.  -Discussed the importance of checking BG's regularly.    -Discontinue Novolog since she is only using it PRN.  -Switch Lantus to 75 units qhs.  She is to call the office in a 3-4 days with FSBG's to determine if further adjustment is needed.  -Increase Ozempic 1 mg weekly. Patient has no personal history of pancreatitis, no family history of MEN syndrome or medullary thyroid cancer. Possible side effects including nausea, bloating, other GI upset and rarely pancreatitis were discussed. She was advised to call the office with any symptoms or concerns.   -Start Jardiance 10 mg QD. Discussed the medication's MOA and that it may cause more frequent urination particularly upon starting the medication. Take one tablet in the morning with or without food. Encouraged to drink plenty of water as to not get dehydrated especially in warmer weather or with activity. Also discussed there may be an increased incidence of UTIs or yeast infections and to monitor for signs/symptoms  -S/S hypoglycemia reviewed with Rule of 15's advised.  -Follow-up in 3 months.

## 2022-09-07 ENCOUNTER — SPECIALTY PHARMACY (OUTPATIENT)
Dept: PHARMACY | Facility: HOSPITAL | Age: 47
End: 2022-09-07

## 2022-09-16 RX ORDER — INSULIN GLARGINE 100 [IU]/ML
52 INJECTION, SOLUTION SUBCUTANEOUS 2 TIMES DAILY
Qty: 30 ML | Refills: 2 | Status: SHIPPED | OUTPATIENT
Start: 2022-09-16 | End: 2022-09-19 | Stop reason: SDUPTHER

## 2022-09-19 RX ORDER — INSULIN GLARGINE 100 [IU]/ML
52 INJECTION, SOLUTION SUBCUTANEOUS 2 TIMES DAILY
Qty: 30 ML | Refills: 2 | Status: SHIPPED | OUTPATIENT
Start: 2022-09-19 | End: 2022-09-19

## 2022-09-19 RX ORDER — PROCHLORPERAZINE 25 MG/1
SUPPOSITORY RECTAL
Qty: 3 EACH | Refills: 11 | Status: SHIPPED | OUTPATIENT
Start: 2022-09-19

## 2022-09-19 RX ORDER — PROCHLORPERAZINE 25 MG/1
1 SUPPOSITORY RECTAL
Qty: 1 EACH | Refills: 3 | Status: SHIPPED | OUTPATIENT
Start: 2022-09-19

## 2022-09-28 RX ORDER — SEMAGLUTIDE 1.34 MG/ML
1 INJECTION, SOLUTION SUBCUTANEOUS WEEKLY
Qty: 3 ML | Refills: 2 | Status: SHIPPED | OUTPATIENT
Start: 2022-09-28 | End: 2022-11-22

## 2022-11-08 RX ORDER — INSULIN GLARGINE 100 [IU]/ML
INJECTION, SOLUTION SUBCUTANEOUS
Qty: 30 ML | Refills: 2 | Status: SHIPPED | OUTPATIENT
Start: 2022-11-08 | End: 2023-02-08 | Stop reason: SDUPTHER

## 2022-11-22 RX ORDER — SEMAGLUTIDE 1.34 MG/ML
INJECTION, SOLUTION SUBCUTANEOUS
Qty: 3 ML | Refills: 2 | Status: SHIPPED | OUTPATIENT
Start: 2022-11-22 | End: 2023-02-14

## 2022-12-13 RX ORDER — INSULIN ASPART 100 [IU]/ML
INJECTION, SOLUTION INTRAVENOUS; SUBCUTANEOUS
Qty: 15 ML | Refills: 2 | Status: SHIPPED | OUTPATIENT
Start: 2022-12-13 | End: 2023-02-16

## 2022-12-14 RX ORDER — EMPAGLIFLOZIN 10 MG/1
TABLET, FILM COATED ORAL
Qty: 30 TABLET | Refills: 3 | Status: SHIPPED | OUTPATIENT
Start: 2022-12-14 | End: 2022-12-26 | Stop reason: SDUPTHER

## 2023-01-24 ENCOUNTER — SPECIALTY PHARMACY (OUTPATIENT)
Dept: PHARMACY | Facility: HOSPITAL | Age: 48
End: 2023-01-24
Payer: MEDICAID

## 2023-01-24 ENCOUNTER — OFFICE VISIT (OUTPATIENT)
Dept: ENDOCRINOLOGY | Facility: CLINIC | Age: 48
End: 2023-01-24
Payer: MEDICAID

## 2023-01-24 VITALS
OXYGEN SATURATION: 97 % | SYSTOLIC BLOOD PRESSURE: 128 MMHG | WEIGHT: 244.2 LBS | BODY MASS INDEX: 39.25 KG/M2 | DIASTOLIC BLOOD PRESSURE: 90 MMHG | HEART RATE: 66 BPM | HEIGHT: 66 IN

## 2023-01-24 DIAGNOSIS — E11.65 TYPE 2 DIABETES MELLITUS WITH HYPERGLYCEMIA, WITH LONG-TERM CURRENT USE OF INSULIN: Primary | ICD-10-CM

## 2023-01-24 DIAGNOSIS — Z79.4 TYPE 2 DIABETES MELLITUS WITH HYPERGLYCEMIA, WITH LONG-TERM CURRENT USE OF INSULIN: Primary | ICD-10-CM

## 2023-01-24 LAB
EXPIRATION DATE: NORMAL
GLUCOSE BLDC GLUCOMTR-MCNC: 127 MG/DL (ref 70–130)
HBA1C MFR BLD: 8.3 %
Lab: NORMAL

## 2023-01-24 PROCEDURE — 83036 HEMOGLOBIN GLYCOSYLATED A1C: CPT | Performed by: NURSE PRACTITIONER

## 2023-01-24 PROCEDURE — 95251 CONT GLUC MNTR ANALYSIS I&R: CPT | Performed by: NURSE PRACTITIONER

## 2023-01-24 PROCEDURE — 99214 OFFICE O/P EST MOD 30 MIN: CPT | Performed by: NURSE PRACTITIONER

## 2023-01-24 PROCEDURE — 3052F HG A1C>EQUAL 8.0%<EQUAL 9.0%: CPT | Performed by: NURSE PRACTITIONER

## 2023-01-24 RX ORDER — HYDROCODONE BITARTRATE AND ACETAMINOPHEN 5; 325 MG/1; MG/1
1 TABLET ORAL EVERY 8 HOURS PRN
COMMUNITY
Start: 2022-12-30

## 2023-01-24 NOTE — PROGRESS NOTES
Specialty Pharmacy Patient Management Program  Endocrinology Initial Assessment       Marquita Vernon is a 47 y.o. female with Type 2 Diabetes seen by an Endocrinology provider and enrolled in the Endocrinology Patient Management program offered by [facility].  An initial assessment was conducted, including assessment of therapy appropriateness and specialty medication education for Jardiance, Insulin therapy and Dexcom. The patient was introduced to services offered by [facility], including: regular assessments, refill coordination, curbside pick-up or mail order delivery options, prior authorization maintenance, and financial assistance programs as applicable. The patient was also provided with contact information for the pharmacy team.     Patient is currently taking Jardiance 10 mg daily, Ozempic 1 mg once weekly, and Lantus 75 units once nightly and occasionally takes Novolog if BG elevated or anticipating to have larger meal. Patient is tolerating the medications with no adverse effects or adherence issues.    Patient checks BG with Dexcom readings from  mg/dL. Patient reports low BG <70 about 5-6 times in the past month, mainly during the night. She reports to have had 46 mg/dL a couple of nights ago despite not taking her insulin at night.     Patient denies personal/family history of thyroid cancer, reports history of pancreatitis (2018, patient denies any issues since being on Ozempic), and denies issues with recurrent UTI/yeast infections.     In the past, the patient has not tried any other diabetes medications.     Insurance Coverage & Financial Support  [place] Medicaid    Relevant Past Medical History and Comorbidities  Relevant medical history and concomitant health conditions were discussed with the patient. The patient's chart has been reviewed for relevant past medical history and comorbid health conditions and updated as necessary.   Past Medical  History:   Diagnosis Date   • Abnormal ECG    • Arthritis    • Asthma    • Diabetes mellitus (HCC)    • GERD (gastroesophageal reflux disease)    • IBS (irritable bowel syndrome)    • Pancreatitis 2017   • Scoliosis      Social History     Socioeconomic History   • Marital status:    Tobacco Use   • Smoking status: Never   • Smokeless tobacco: Never   Substance and Sexual Activity   • Alcohol use: No   • Drug use: No   • Sexual activity: Yes     Partners: Male     Birth control/protection: Surgical       Allergies  Known allergies and reactions were discussed with the patient. The patient's chart has been reviewed for  allergy information and updated as necessary.   Ibuprofen, Latex, and Vaseline [petrolatum]    Current Medication List  This medication list has been reviewed with the patient and evaluated for any interactions or necessary modifications/recommendations, and updated to include all prescription medications, OTC medications, and supplements the patient is currently taking.  This list reflects what is contained in the patient's profile, which has also been marked as reviewed to communicate to other providers it is the most up to date version of the patient's current medication therapy.     Current Outpatient Medications:   •  Cholecalciferol (VITAMIN D3) 5000 units capsule capsule, Take 5,000 Units by mouth Daily., Disp: , Rfl:   •  Continuous Blood Gluc Sensor (Dexcom G6 Sensor), Every 10 (Ten) Days., Disp: 3 each, Rfl: 11  •  Continuous Blood Gluc Transmit (Dexcom G6 Transmitter) misc, 1 each Every 3 (Three) Months., Disp: 1 each, Rfl: 3  •  empagliflozin (Jardiance) 10 MG tablet tablet, Take 1 tablet by mouth Daily., Disp: 30 tablet, Rfl: 3  •  lactobacillus acidophilus (RISAQUAD) capsule capsule, Take 1 capsule by mouth Daily., Disp: 90 capsule, Rfl: 3  •  Lantus SoloStar 100 UNIT/ML injection pen, INJECT 52 UNITS SUBCUTANEOUSLY TWICE DAILY AS DIRECTED BY PRESCRIBER., Disp: 30 mL, Rfl: 2  •   Loratadine (CLARITIN) 10 MG capsule, Take  by mouth Daily., Disp: , Rfl:   •  metoprolol tartrate (LOPRESSOR) 25 MG tablet, Take 1 tablet by mouth 2 (Two) Times a Day., Disp: 180 tablet, Rfl: 3  •  Multiple Vitamins-Minerals (MULTIVITAMIN ADULT PO), Take  by mouth Daily., Disp: , Rfl:   •  NovoLOG FlexPen 100 UNIT/ML solution pen-injector sc pen, INJECT 22 UNITS UNDER THE SKIN INTO THE APPROPRIATE AREA AS DIRECTED 3 (THREE) TIMES A DAY WITH MEALS., Disp: 15 mL, Rfl: 2  •  omeprazole (priLOSEC) 20 MG capsule, Take 1 capsule by mouth 2 (Two) Times a Day Before Meals. (Patient taking differently: Take 20 mg by mouth Daily.), Disp: 20 capsule, Rfl: 0  •  Ozempic, 1 MG/DOSE, 4 MG/3ML solution pen-injector, INJECT 1 MG UNDER THE SKIN INTO THE APPROPRIATE AREA AS DIRECTED 1 TIME PER WEEK., Disp: 3 mL, Rfl: 2  •  PARoxetine (PAXIL) 10 MG tablet, Take 10 mg by mouth Every Morning., Disp: , Rfl:   •  raNITIdine (ZANTAC) 300 MG tablet, Take 1 tablet by mouth 2 (Two) Times a Day., Disp: 28 tablet, Rfl: 0  •  tiZANidine (ZANAFLEX) 4 MG tablet, Take 4 mg by mouth Every 6 (Six) Hours As Needed for Muscle Spasms., Disp: , Rfl:     Drug Interactions  Paroxetine + Jardiance, Novolog, Lantus and Ozempic: Selective Serotonin Reuptake Inhibitors may enhance the hypoglycemic effect of Agents with Blood Glucose Lowering Effects.     Metoprolol + Jardiance, Novolog, Lantus and Ozempic: Beta-Blockers (Beta1 Selective) may enhance the hypoglycemic effect of Antidiabetic Agents    Recommended Medications Assessment  • Aspirin - Not Indicated  • Statin - Not Indicated  • ACEi/ARB - Not Indicated    Current 10-Year ASCVD Risk: 3.5%    Relevant Laboratory Values  A1C Last 3 Results    HGBA1C Last 3 Results 9/6/22 1/24/23   Hemoglobin A1C 8.10 (A) 8.3   (A) Abnormal value            Lab Results   Component Value Date    HGBA1C 8.3 01/24/2023     Lab Results   Component Value Date    GLUCOSE 75 08/13/2022    CALCIUM 9.9 08/13/2022      08/13/2022    K 5.0 08/13/2022    CO2 24.4 08/13/2022     (H) 08/13/2022    BUN 20 08/13/2022    CREATININE 1.21 (H) 08/13/2022    EGFRIFNONA 77 04/27/2019    BCR 16.5 08/13/2022    ANIONGAP 11.6 08/13/2022        Adherence and Self-Administration  • Barriers to Patient Adherence and/or Self-Administration: None   • Methods for Supporting Patient Adherence and/or Self-Administration: None Required    Goals of Therapy  • Patient Goals of Therapy: Take medication every day  • Clinical Goals or Therapeutic Targets, If Applicable: A1C Reduction      Reassessment Plan & Follow-Up  1. Patient's diabetes has worsened with A1C increased from 8.1% to 8.3%.  2. Recent Provider Changes:  None discussed with patient  3. Additional Plans, Therapy Recommendations or Therapy Problems to Be Addressed:   a. Due to hypoglycemia, consider decreasing the dose of Lantus  b. Patient has room to increase doses of both Jardiance and Ozempic if needed for further control, as patient is tolerating well with no side effects  c. Due to hypoglycemia, will recommend starting [employer] for Medicaid. Will submit PA today.  i. (Key: [id number]) - [id number]  4. Patient denies issues with affordability or adherence at this time.     Patient does not wish to use [facility] at this time due to: loyalty to retail pharmacy    Attestation  I attest that the initiated specialty medication(s) are appropriate for the patient based on my assessment.  If the prescribed therapy is at any point deemed not appropriate based on the current or future assessments, a consultation will be initiated with the patient's specialty care provider to determine the best course of action. The revised plan of therapy will be documented along with any additional patient education provided.     Thank you,    Shaye Morales [facility]  01/24/23  09:28 EST

## 2023-01-24 NOTE — PROGRESS NOTES
Chief Complaint   Patient presents with   • Diabetes     Follow up        Referring Provider  No ref. provider found     HPI   Marquita Vernon is a 47 y.o. female had concerns including Diabetes (Follow up).    T2DM.    Diabetes was diagnosed 2009.  Complications include neuropathy.  Last ophtho exam was Feb 2022, [facility], [facility].  Current medications for diabetes include Lantus 75 units QHS, Novolog PRN, Ozempic 1 mg weekly, Jardiance 10 mg QD.  Past meds: Metformin-GI issues, Januvia-insurance issue  She checks her blood sugar with Dexcom CGM.  Hypos: occasionally, mostly during the afternoon/early evening, or approximately 2 hours after eating a meal.    ACE/ARB:no, Statin: no  Labs:  A1C:8.3 (5/2022)    The following portions of the patient's history were reviewed and updated as appropriate: allergies, current medications, past family history, past medical history, past social history, past surgical history and problem list.    Diet:   Breakfast: doesn't typically eat  Lunch: salad or left overs  Dinner: veggies, meat,   Drinks: diet mtn dew, milk  Snacks: popcorn    Past Medical History:   Diagnosis Date   • Abnormal ECG    • Arthritis    • Asthma    • Diabetes mellitus (HCC)    • GERD (gastroesophageal reflux disease)    • IBS (irritable bowel syndrome)    • Pancreatitis 2017   • Scoliosis      Past Surgical History:   Procedure Laterality Date   • CARDIOVASCULAR STRESS TEST  11/27/2018    L. Cardiolite- Unable to walk. EF 69%. Negative.   • CHOLECYSTECTOMY     • COLONOSCOPY     • COLONOSCOPY N/A 9/21/2018    Procedure: COLONOSCOPY CPT CODE: 62397;  Surgeon: Micah Swartz MD;  Location: [facility];  Service: Gastroenterology   • CYST REMOVAL     • ECHO - CONVERTED  11/27/2018    EF 65%. No AS. Mild MR   • ENDOSCOPY     • ENDOSCOPY N/A 9/21/2018    Procedure: ESOPHAGOGASTRODUODENOSCOPY WITH BIOPSY CPT CODE: 53657;  Surgeon: Micah Swartz MD;  Location: [facility];  Service:  "Gastroenterology   • OTHER SURGICAL HISTORY  01/02/2019    Event monitor- baseline sinus, one 39 beat SVT, no V-tach, no AFib, no pauses   • TUBAL ABDOMINAL LIGATION     • UPPER GASTROINTESTINAL ENDOSCOPY  2015 2018      Family History   Problem Relation Age of Onset   • Diabetes Other    • Cancer Other    • Lung cancer Other    • Heart attack Father         MI in his late 40's   • No Known Problems Sister       Social History     Socioeconomic History   • Marital status:    Tobacco Use   • Smoking status: Never   • Smokeless tobacco: Never   Substance and Sexual Activity   • Alcohol use: No   • Drug use: No   • Sexual activity: Yes     Partners: Male     Birth control/protection: Surgical      Allergies   Allergen Reactions   • Ibuprofen Hives and Other (See Comments)     \"smiley\"   • Latex Hives   • Vaseline [Petrolatum] Hives      Current Outpatient Medications on File Prior to Visit   Medication Sig Dispense Refill   • Continuous Blood Gluc Sensor (Dexcom G6 Sensor) Every 10 (Ten) Days. 3 each 11   • Continuous Blood Gluc Transmit (Dexcom G6 Transmitter) misc 1 each Every 3 (Three) Months. 1 each 3   • lactobacillus acidophilus (RISAQUAD) capsule capsule Take 1 capsule by mouth Daily. 90 capsule 3   • Lantus SoloStar 100 UNIT/ML injection pen INJECT 52 UNITS SUBCUTANEOUSLY TWICE DAILY AS DIRECTED BY PRESCRIBER. (Patient taking differently: Inject 75 Units under the skin into the appropriate area as directed Every Night.) 30 mL 2   • Loratadine 10 MG capsule Take 10 mg by mouth Daily.     • metoprolol tartrate (LOPRESSOR) 25 MG tablet Take 1 tablet by mouth 2 (Two) Times a Day. 180 tablet 3   • Multiple Vitamins-Minerals (MULTIVITAMIN ADULT PO) Take 1 tablet by mouth Daily.     • NovoLOG FlexPen 100 UNIT/ML solution pen-injector sc pen INJECT 22 UNITS UNDER THE SKIN INTO THE APPROPRIATE AREA AS DIRECTED 3 (THREE) TIMES A DAY WITH MEALS. (Patient taking differently: Inject 24 Units under the skin into " "the appropriate area as directed As Needed.) 15 mL 2   • omeprazole (priLOSEC) 20 MG capsule Take 1 capsule by mouth 2 (Two) Times a Day Before Meals. (Patient taking differently: Take 20 mg by mouth Daily.) 20 capsule 0   • Ozempic, 1 MG/DOSE, 4 MG/3ML solution pen-injector INJECT 1 MG UNDER THE SKIN INTO THE APPROPRIATE AREA AS DIRECTED 1 TIME PER WEEK. 3 mL 2   • PARoxetine (PAXIL) 10 MG tablet Take 10 mg by mouth Every Morning.     • tiZANidine (ZANAFLEX) 4 MG tablet Take 4 mg by mouth Every 6 (Six) Hours As Needed for Muscle Spasms.     • [DISCONTINUED] Cholecalciferol (VITAMIN D3) 5000 units capsule capsule Take 5,000 Units by mouth Daily.     • [DISCONTINUED] empagliflozin (Jardiance) 10 MG tablet tablet Take 1 tablet by mouth Daily. 30 tablet 3   • [DISCONTINUED] raNITIdine (ZANTAC) 300 MG tablet Take 1 tablet by mouth 2 (Two) Times a Day. 28 tablet 0     No current facility-administered medications on file prior to visit.        Review of Systems   Constitutional: Positive for fatigue. Negative for unexpected weight gain and unexpected weight loss.   Eyes: Negative.    Endocrine: Negative for polydipsia, polyphagia and polyuria.   Psychiatric/Behavioral: Positive for sleep disturbance.   All other systems reviewed and are negative.     /90 (BP Location: Left arm, Patient Position: Sitting, Cuff Size: Adult)   Pulse 66   Ht 167.6 cm (66\")   Wt 111 kg (244 lb 3.2 oz)   SpO2 97%   BMI 39.41 kg/m²      Physical Exam  Vitals reviewed.   Constitutional:       Appearance: Normal appearance.   Eyes:      Extraocular Movements: Extraocular movements intact.   Cardiovascular:      Rate and Rhythm: Normal rate.   Pulmonary:      Effort: Pulmonary effort is normal.   Skin:     General: Skin is warm and dry.   Neurological:      General: No focal deficit present.      Mental Status: She is alert and oriented to person, place, and time.   Psychiatric:         Mood and Affect: Mood normal.         Behavior: " Behavior normal.         Thought Content: Thought content normal.         Judgment: Judgment normal.       CMP:  Lab Results   Component Value Date    BUN 20 08/13/2022    CREATININE 1.21 (H) 08/13/2022    EGFRIFNONA 77 04/27/2019    BCR 16.5 08/13/2022     08/13/2022    K 5.0 08/13/2022    CO2 24.4 08/13/2022    CALCIUM 9.9 08/13/2022    ALBUMIN 4.41 08/13/2022    BILITOT 0.4 08/13/2022    ALKPHOS 86 08/13/2022    AST 24 08/13/2022    ALT 19 08/13/2022     Lipid Panel:  No results found for: CHOL, TRIG, HDL, VLDL, LDL  HbA1c:  Lab Results   Component Value Date    HGBA1C 8.3 01/24/2023    HGBA1C 8.10 (H) 09/06/2022     Glucose:  Lab Results   Component Value Date    POCGLU 127 01/24/2023     Microalbumin:  No results found for: MALBCRERATIO  TSH:  No results found for: TSH    Assessment and Plan    Diagnoses and all orders for this visit:    1. Type 2 diabetes mellitus with hyperglycemia, with long-term current use of insulin (HCC) (Primary)  Assessment & Plan:  -Diabetes remains stable with A1c 8.3.  -Discussed dietary and exercise guidelines with patient and family.  -Discussed the importance of yearly eye exams.  -Discussed the importance of checking BG's regularly. Continue using Dexcom CGM.  2 week data download is as follows:  Very High: 37%  High: 32%  In Range: 29%  Low: 1%  Very Low: <1%  Trend shows some mild hyperglycemia overall.    -She would benefit from having glucagon to correct overt hypos.  Will send in RX.  -Continue Lantus 75 units qhs.  -Discontinue Novolog.  -Continue Ozempic 1 mg weekly. Patient has no personal history of pancreatitis, no family history of MEN syndrome or medullary thyroid cancer. Possible side effects including nausea, bloating, other GI upset and rarely pancreatitis were discussed. She was advised to call the office with any symptoms or concerns.   -Increase Jardiance 25 mg QD. Discussed the medication's MOA and that it may cause more frequent urination particularly  upon starting the medication. Take one tablet in the morning with or without food. Encouraged to drink plenty of water as to not get dehydrated especially in warmer weather or with activity. Also discussed there may be an increased incidence of UTIs or yeast infections and to monitor for signs/symptoms  -S/S hypoglycemia reviewed with Rule of 15's advised.  -Follow-up in 3 months.    Orders:  -     POC Glucose Fingerstick  -     POC Glycosylated Hemoglobin (Hb A1C)    Other orders  -     empagliflozin (Jardiance) 25 MG tablet tablet; Take 1 tablet by mouth Daily.  Dispense: 30 tablet; Refill: 6       Return in about 3 months (around 4/24/2023) for Follow-up appointment, A1C. The patient was instructed to contact the clinic with any interval questions or concerns.        This document has been electronically signed by ALIE Gonzalez  January 24, 2023 09:28 EST   Endocrinology

## 2023-01-24 NOTE — ASSESSMENT & PLAN NOTE
-Diabetes remains stable with A1c 8.3.  -Discussed dietary and exercise guidelines with patient and family.  -Discussed the importance of yearly eye exams.  -Discussed the importance of checking BG's regularly. Continue using Dexcom CGM.  2 week data download is as follows:  Very High: 37%  High: 32%  In Range: 29%  Low: 1%  Very Low: <1%  Trend shows some mild hyperglycemia overall.    -She would benefit from having glucagon to correct overt hypos.  Will send in RX.  -Continue Lantus 75 units qhs.  -Discontinue Novolog.  -Continue Ozempic 1 mg weekly. Patient has no personal history of pancreatitis, no family history of MEN syndrome or medullary thyroid cancer. Possible side effects including nausea, bloating, other GI upset and rarely pancreatitis were discussed. She was advised to call the office with any symptoms or concerns.   -Increase Jardiance 25 mg QD. Discussed the medication's MOA and that it may cause more frequent urination particularly upon starting the medication. Take one tablet in the morning with or without food. Encouraged to drink plenty of water as to not get dehydrated especially in warmer weather or with activity. Also discussed there may be an increased incidence of UTIs or yeast infections and to monitor for signs/symptoms  -S/S hypoglycemia reviewed with Rule of 15's advised.  -Follow-up in 3 months.

## 2023-01-25 RX ORDER — GLUCAGON INJECTION, SOLUTION 1 MG/.2ML
1 INJECTION, SOLUTION SUBCUTANEOUS AS NEEDED
Qty: 0.4 ML | Refills: 2 | Status: SHIPPED | OUTPATIENT
Start: 2023-01-25 | End: 2023-01-26

## 2023-01-26 RX ORDER — GLUCAGON 1 MG
1 KIT INJECTION AS NEEDED
Qty: 1 EACH | Refills: 2 | Status: SHIPPED | OUTPATIENT
Start: 2023-01-26 | End: 2023-01-28 | Stop reason: SDUPTHER

## 2023-01-30 RX ORDER — GLUCAGON 1 MG
1 KIT INJECTION AS NEEDED
Qty: 1 EACH | Refills: 2 | Status: SHIPPED | OUTPATIENT
Start: 2023-01-30 | End: 2023-04-02 | Stop reason: SDUPTHER

## 2023-02-08 RX ORDER — INSULIN GLARGINE 100 [IU]/ML
52 INJECTION, SOLUTION SUBCUTANEOUS NIGHTLY
Qty: 30 ML | Refills: 4 | Status: SHIPPED | OUTPATIENT
Start: 2023-02-08 | End: 2023-02-20 | Stop reason: SDUPTHER

## 2023-02-08 RX ORDER — PEN NEEDLE, DIABETIC 30 GX5/16"
NEEDLE, DISPOSABLE MISCELLANEOUS
Qty: 200 EACH | Refills: 5 | Status: SHIPPED | OUTPATIENT
Start: 2023-02-08

## 2023-02-14 RX ORDER — SEMAGLUTIDE 1.34 MG/ML
INJECTION, SOLUTION SUBCUTANEOUS
Qty: 3 ML | Refills: 2 | Status: SHIPPED | OUTPATIENT
Start: 2023-02-14

## 2023-02-16 RX ORDER — INSULIN ASPART 100 [IU]/ML
24 INJECTION, SOLUTION INTRAVENOUS; SUBCUTANEOUS
Qty: 30 ML | Refills: 2 | Status: SHIPPED | OUTPATIENT
Start: 2023-02-16

## 2023-02-21 RX ORDER — INSULIN GLARGINE 100 [IU]/ML
52 INJECTION, SOLUTION SUBCUTANEOUS NIGHTLY
Qty: 30 ML | Refills: 4 | Status: SHIPPED | OUTPATIENT
Start: 2023-02-21 | End: 2023-02-23 | Stop reason: SDUPTHER

## 2023-02-23 RX ORDER — INSULIN GLARGINE 100 [IU]/ML
75 INJECTION, SOLUTION SUBCUTANEOUS NIGHTLY
Qty: 30 ML | Refills: 4 | Status: SHIPPED | OUTPATIENT
Start: 2023-02-23

## 2023-02-23 NOTE — TELEPHONE ENCOUNTER
Pt called to request refill on Lantus. But in her message she said that she has been doing 75 units but the last refill she got said 52.

## 2023-03-16 ENCOUNTER — TRANSCRIBE ORDERS (OUTPATIENT)
Dept: ADMINISTRATIVE | Facility: HOSPITAL | Age: 48
End: 2023-03-16
Payer: MEDICAID

## 2023-03-16 DIAGNOSIS — N18.9 CHRONIC KIDNEY DISEASE, UNSPECIFIED CKD STAGE: Primary | ICD-10-CM

## 2023-03-20 RX ORDER — BLOOD-GLUCOSE METER
EACH MISCELLANEOUS
Qty: 100 EACH | Refills: 11 | Status: SHIPPED | OUTPATIENT
Start: 2023-03-20

## 2023-04-03 RX ORDER — GLUCAGON 1 MG
1 KIT INJECTION AS NEEDED
Qty: 1 EACH | Refills: 2 | Status: SHIPPED | OUTPATIENT
Start: 2023-04-03 | End: 2023-04-04

## 2023-04-04 ENCOUNTER — TRANSCRIBE ORDERS (OUTPATIENT)
Dept: ADMINISTRATIVE | Facility: HOSPITAL | Age: 48
End: 2023-04-04
Payer: MEDICAID

## 2023-04-04 ENCOUNTER — LAB (OUTPATIENT)
Dept: LAB | Facility: HOSPITAL | Age: 48
End: 2023-04-04
Payer: MEDICAID

## 2023-04-04 ENCOUNTER — HOSPITAL ENCOUNTER (OUTPATIENT)
Dept: ULTRASOUND IMAGING | Facility: HOSPITAL | Age: 48
Discharge: HOME | End: 2023-04-04
Payer: MEDICAID

## 2023-04-04 DIAGNOSIS — N18.9 CHRONIC KIDNEY DISEASE, UNSPECIFIED CKD STAGE: ICD-10-CM

## 2023-04-04 DIAGNOSIS — N18.9 CHRONIC KIDNEY DISEASE, UNSPECIFIED CKD STAGE: Primary | ICD-10-CM

## 2023-04-04 LAB
ALBUMIN SERPL-MCNC: 4.1 G/DL (ref 3.5–5.2)
ALBUMIN UR-MCNC: <1.2 MG/DL
ALBUMIN/GLOB SERPL: 1.4 G/DL
ALP SERPL-CCNC: 87 U/L (ref 39–117)
ALT SERPL W P-5'-P-CCNC: 18 U/L (ref 1–33)
ANION GAP SERPL CALCULATED.3IONS-SCNC: 11.7 MMOL/L (ref 5–15)
AST SERPL-CCNC: 14 U/L (ref 1–32)
BACTERIA UR QL AUTO: NORMAL /HPF
BILIRUB SERPL-MCNC: 0.4 MG/DL (ref 0–1.2)
BILIRUB UR QL STRIP: NEGATIVE
BUN SERPL-MCNC: 24 MG/DL (ref 6–20)
BUN/CREAT SERPL: 21.1 (ref 7–25)
CALCIUM SPEC-SCNC: 9.6 MG/DL (ref 8.6–10.5)
CHLORIDE SERPL-SCNC: 109 MMOL/L (ref 98–107)
CLARITY UR: CLEAR
CO2 SERPL-SCNC: 22.3 MMOL/L (ref 22–29)
COLOR UR: YELLOW
CREAT SERPL-MCNC: 1.14 MG/DL (ref 0.57–1)
CREAT UR-MCNC: 31.6 MG/DL
CREAT UR-MCNC: 31.6 MG/DL
EGFRCR SERPLBLD CKD-EPI 2021: 59.9 ML/MIN/1.73
GLOBULIN UR ELPH-MCNC: 2.9 GM/DL
GLUCOSE SERPL-MCNC: 256 MG/DL (ref 65–99)
GLUCOSE UR STRIP-MCNC: ABNORMAL MG/DL
HGB UR QL STRIP.AUTO: NEGATIVE
HYALINE CASTS UR QL AUTO: NORMAL /LPF
KETONES UR QL STRIP: NEGATIVE
LEUKOCYTE ESTERASE UR QL STRIP.AUTO: NEGATIVE
MICROALBUMIN/CREAT UR: NORMAL MG/G{CREAT}
NITRITE UR QL STRIP: NEGATIVE
PH UR STRIP.AUTO: 6 [PH] (ref 5–8)
POTASSIUM SERPL-SCNC: 4.2 MMOL/L (ref 3.5–5.2)
PROT ?TM UR-MCNC: <4 MG/DL
PROT SERPL-MCNC: 7 G/DL (ref 6–8.5)
PROT UR QL STRIP: NEGATIVE
PROT/CREAT UR: NORMAL MG/G{CREAT}
RBC # UR STRIP: NORMAL /HPF
REF LAB TEST METHOD: NORMAL
SODIUM SERPL-SCNC: 143 MMOL/L (ref 136–145)
SP GR UR STRIP: >=1.03 (ref 1–1.03)
SQUAMOUS #/AREA URNS HPF: NORMAL /HPF
UROBILINOGEN UR QL STRIP: ABNORMAL
WBC # UR STRIP: NORMAL /HPF

## 2023-04-04 PROCEDURE — 84156 ASSAY OF PROTEIN URINE: CPT

## 2023-04-04 PROCEDURE — 76775 US EXAM ABDO BACK WALL LIM: CPT | Performed by: RADIOLOGY

## 2023-04-04 PROCEDURE — 80053 COMPREHEN METABOLIC PANEL: CPT

## 2023-04-04 PROCEDURE — 76775 US EXAM ABDO BACK WALL LIM: CPT

## 2023-04-04 PROCEDURE — 82570 ASSAY OF URINE CREATININE: CPT

## 2023-04-04 PROCEDURE — 36415 COLL VENOUS BLD VENIPUNCTURE: CPT

## 2023-04-04 PROCEDURE — 81001 URINALYSIS AUTO W/SCOPE: CPT

## 2023-04-04 PROCEDURE — 82043 UR ALBUMIN QUANTITATIVE: CPT

## 2023-04-04 RX ORDER — BLOOD-GLUCOSE METER
1 KIT MISCELLANEOUS
Qty: 1 KIT | Refills: 0 | Status: SHIPPED | OUTPATIENT
Start: 2023-04-04

## 2023-04-04 RX ORDER — GLUCAGON 3 MG/1
3 POWDER NASAL DAILY PRN
Qty: 1 EACH | Refills: 1 | Status: SHIPPED | OUTPATIENT
Start: 2023-04-04

## 2023-04-11 RX ORDER — PROCHLORPERAZINE 25 MG/1
1 SUPPOSITORY RECTAL
Qty: 1 EACH | Refills: 3 | Status: SHIPPED | OUTPATIENT
Start: 2023-04-11

## 2023-04-11 RX ORDER — SEMAGLUTIDE 1.34 MG/ML
INJECTION, SOLUTION SUBCUTANEOUS
Qty: 3 ML | Refills: 2 | Status: SHIPPED | OUTPATIENT
Start: 2023-04-11

## 2023-05-03 RX ORDER — BLOOD-GLUCOSE METER
1 KIT MISCELLANEOUS
Qty: 1 KIT | Refills: 0 | OUTPATIENT
Start: 2023-05-03

## 2023-05-03 RX ORDER — INSULIN GLARGINE 100 [IU]/ML
75 INJECTION, SOLUTION SUBCUTANEOUS NIGHTLY
Qty: 30 ML | Refills: 4 | Status: SHIPPED | OUTPATIENT
Start: 2023-05-03

## 2023-05-05 ENCOUNTER — TELEPHONE (OUTPATIENT)
Dept: ENDOCRINOLOGY | Facility: CLINIC | Age: 48
End: 2023-05-05
Payer: MEDICAID

## 2023-05-05 NOTE — TELEPHONE ENCOUNTER
Spoke with pharmacy.  Insulin Glargine solostar goes through.  They are getting ready for patient to .  Patient notified.

## 2023-05-09 ENCOUNTER — HOSPITAL ENCOUNTER (OUTPATIENT)
Facility: HOSPITAL | Age: 48
Setting detail: OBSERVATION
Discharge: HOME | End: 2023-05-11
Attending: STUDENT IN AN ORGANIZED HEALTH CARE EDUCATION/TRAINING PROGRAM | Admitting: STUDENT IN AN ORGANIZED HEALTH CARE EDUCATION/TRAINING PROGRAM
Payer: MEDICAID

## 2023-05-09 ENCOUNTER — APPOINTMENT (OUTPATIENT)
Dept: GENERAL RADIOLOGY | Facility: HOSPITAL | Age: 48
End: 2023-05-09
Payer: MEDICAID

## 2023-05-09 DIAGNOSIS — R73.9 HYPERGLYCEMIA: Primary | ICD-10-CM

## 2023-05-09 PROBLEM — E11.10 DKA (DIABETIC KETOACIDOSIS): Status: ACTIVE | Noted: 2023-05-09

## 2023-05-09 LAB
A-A DO2: 22.5 MMHG (ref 0–300)
ACETONE BLD QL: ABNORMAL
ALBUMIN SERPL-MCNC: 3.5 G/DL (ref 3.5–5.2)
ALBUMIN SERPL-MCNC: 4 G/DL (ref 3.5–5.2)
ALBUMIN/GLOB SERPL: 1.2 G/DL
ALBUMIN/GLOB SERPL: 1.3 G/DL
ALP SERPL-CCNC: 115 U/L (ref 39–117)
ALP SERPL-CCNC: 93 U/L (ref 39–117)
ALT SERPL W P-5'-P-CCNC: 13 U/L (ref 1–33)
ALT SERPL W P-5'-P-CCNC: 16 U/L (ref 1–33)
ANION GAP SERPL CALCULATED.3IONS-SCNC: 18 MMOL/L (ref 5–15)
ANION GAP SERPL CALCULATED.3IONS-SCNC: 20.7 MMOL/L (ref 5–15)
ANION GAP SERPL CALCULATED.3IONS-SCNC: 27.2 MMOL/L (ref 5–15)
ARTERIAL PATENCY WRIST A: POSITIVE
AST SERPL-CCNC: 15 U/L (ref 1–32)
AST SERPL-CCNC: 15 U/L (ref 1–32)
ATMOSPHERIC PRESS: 727 MMHG
BASE EXCESS BLDA CALC-SCNC: -11.2 MMOL/L (ref 0–2)
BASOPHILS # BLD AUTO: 0.06 10*3/MM3 (ref 0–0.2)
BASOPHILS # BLD AUTO: 0.08 10*3/MM3 (ref 0–0.2)
BASOPHILS NFR BLD AUTO: 0.6 % (ref 0–1.5)
BASOPHILS NFR BLD AUTO: 0.6 % (ref 0–1.5)
BDY SITE: ABNORMAL
BILIRUB SERPL-MCNC: 0.6 MG/DL (ref 0–1.2)
BILIRUB SERPL-MCNC: 0.8 MG/DL (ref 0–1.2)
BUN SERPL-MCNC: 27 MG/DL (ref 6–20)
BUN SERPL-MCNC: 28 MG/DL (ref 6–20)
BUN SERPL-MCNC: 34 MG/DL (ref 6–20)
BUN/CREAT SERPL: 20.2 (ref 7–25)
BUN/CREAT SERPL: 21.1 (ref 7–25)
BUN/CREAT SERPL: 22 (ref 7–25)
CALCIUM SPEC-SCNC: 8.4 MG/DL (ref 8.6–10.5)
CALCIUM SPEC-SCNC: 8.4 MG/DL (ref 8.6–10.5)
CALCIUM SPEC-SCNC: 9.2 MG/DL (ref 8.6–10.5)
CHLORIDE SERPL-SCNC: 103 MMOL/L (ref 98–107)
CHLORIDE SERPL-SCNC: 105 MMOL/L (ref 98–107)
CHLORIDE SERPL-SCNC: 97 MMOL/L (ref 98–107)
CO2 BLDA-SCNC: 15 MMOL/L (ref 22–33)
CO2 SERPL-SCNC: 11.3 MMOL/L (ref 22–29)
CO2 SERPL-SCNC: 13 MMOL/L (ref 22–29)
CO2 SERPL-SCNC: 9.8 MMOL/L (ref 22–29)
COHGB MFR BLD: 0.6 % (ref 0–5)
CREAT SERPL-MCNC: 1.27 MG/DL (ref 0.57–1)
CREAT SERPL-MCNC: 1.28 MG/DL (ref 0.57–1)
CREAT SERPL-MCNC: 1.68 MG/DL (ref 0.57–1)
D-LACTATE SERPL-SCNC: 1.4 MMOL/L (ref 0.5–2)
DEPRECATED RDW RBC AUTO: 45 FL (ref 37–54)
DEPRECATED RDW RBC AUTO: 45.6 FL (ref 37–54)
EGFRCR SERPLBLD CKD-EPI 2021: 37.4 ML/MIN/1.73
EGFRCR SERPLBLD CKD-EPI 2021: 51.8 ML/MIN/1.73
EGFRCR SERPLBLD CKD-EPI 2021: 52.3 ML/MIN/1.73
EOSINOPHIL # BLD AUTO: 0.1 10*3/MM3 (ref 0–0.4)
EOSINOPHIL # BLD AUTO: 0.12 10*3/MM3 (ref 0–0.4)
EOSINOPHIL NFR BLD AUTO: 0.9 % (ref 0.3–6.2)
EOSINOPHIL NFR BLD AUTO: 1 % (ref 0.3–6.2)
ERYTHROCYTE [DISTWIDTH] IN BLOOD BY AUTOMATED COUNT: 13.3 % (ref 12.3–15.4)
ERYTHROCYTE [DISTWIDTH] IN BLOOD BY AUTOMATED COUNT: 13.5 % (ref 12.3–15.4)
FLUAV RNA RESP QL NAA+PROBE: NOT DETECTED
FLUBV RNA RESP QL NAA+PROBE: NOT DETECTED
GEN 5 2HR TROPONIN T REFLEX: 11 NG/L
GLOBULIN UR ELPH-MCNC: 2.6 GM/DL
GLOBULIN UR ELPH-MCNC: 3.3 GM/DL
GLUCOSE BLDC GLUCOMTR-MCNC: 169 MG/DL (ref 70–130)
GLUCOSE BLDC GLUCOMTR-MCNC: 207 MG/DL (ref 70–130)
GLUCOSE BLDC GLUCOMTR-MCNC: 235 MG/DL (ref 70–130)
GLUCOSE BLDC GLUCOMTR-MCNC: 255 MG/DL (ref 70–130)
GLUCOSE BLDC GLUCOMTR-MCNC: 420 MG/DL (ref 70–130)
GLUCOSE SERPL-MCNC: 187 MG/DL (ref 65–99)
GLUCOSE SERPL-MCNC: 247 MG/DL (ref 65–99)
GLUCOSE SERPL-MCNC: 426 MG/DL (ref 65–99)
HBA1C MFR BLD: 8.8 % (ref 4.8–5.6)
HCO3 BLDA-SCNC: 14.1 MMOL/L (ref 20–26)
HCT VFR BLD AUTO: 35.9 % (ref 34–46.6)
HCT VFR BLD AUTO: 42.5 % (ref 34–46.6)
HCT VFR BLD CALC: 36.2 % (ref 38–51)
HGB BLD-MCNC: 11.7 G/DL (ref 12–15.9)
HGB BLD-MCNC: 13.5 G/DL (ref 12–15.9)
HGB BLDA-MCNC: 11.8 G/DL (ref 13.5–17.5)
IMM GRANULOCYTES # BLD AUTO: 0.04 10*3/MM3 (ref 0–0.05)
IMM GRANULOCYTES # BLD AUTO: 0.06 10*3/MM3 (ref 0–0.05)
IMM GRANULOCYTES NFR BLD AUTO: 0.4 % (ref 0–0.5)
IMM GRANULOCYTES NFR BLD AUTO: 0.4 % (ref 0–0.5)
INHALED O2 CONCENTRATION: 21 %
LYMPHOCYTES # BLD AUTO: 1.68 10*3/MM3 (ref 0.7–3.1)
LYMPHOCYTES # BLD AUTO: 2.38 10*3/MM3 (ref 0.7–3.1)
LYMPHOCYTES NFR BLD AUTO: 12.2 % (ref 19.6–45.3)
LYMPHOCYTES NFR BLD AUTO: 23.5 % (ref 19.6–45.3)
Lab: ABNORMAL
MAGNESIUM SERPL-MCNC: 2 MG/DL (ref 1.6–2.6)
MAGNESIUM SERPL-MCNC: 2.1 MG/DL (ref 1.6–2.6)
MCH RBC QN AUTO: 30 PG (ref 26.6–33)
MCH RBC QN AUTO: 30.2 PG (ref 26.6–33)
MCHC RBC AUTO-ENTMCNC: 31.8 G/DL (ref 31.5–35.7)
MCHC RBC AUTO-ENTMCNC: 32.6 G/DL (ref 31.5–35.7)
MCV RBC AUTO: 92.5 FL (ref 79–97)
MCV RBC AUTO: 94.4 FL (ref 79–97)
METHGB BLD QL: 0.8 % (ref 0–3)
MODALITY: ABNORMAL
MONOCYTES # BLD AUTO: 0.43 10*3/MM3 (ref 0.1–0.9)
MONOCYTES # BLD AUTO: 0.59 10*3/MM3 (ref 0.1–0.9)
MONOCYTES NFR BLD AUTO: 3.1 % (ref 5–12)
MONOCYTES NFR BLD AUTO: 5.8 % (ref 5–12)
NEUTROPHILS NFR BLD AUTO: 11.45 10*3/MM3 (ref 1.7–7)
NEUTROPHILS NFR BLD AUTO: 6.97 10*3/MM3 (ref 1.7–7)
NEUTROPHILS NFR BLD AUTO: 68.7 % (ref 42.7–76)
NEUTROPHILS NFR BLD AUTO: 82.8 % (ref 42.7–76)
NOTE: ABNORMAL
NOTIFIED BY: ABNORMAL
NOTIFIED WHO: ABNORMAL
NRBC BLD AUTO-RTO: 0 /100 WBC (ref 0–0.2)
NRBC BLD AUTO-RTO: 0 /100 WBC (ref 0–0.2)
OXYHGB MFR BLDV: 95 % (ref 94–99)
PCO2 BLDA: 29.4 MM HG (ref 35–45)
PCO2 TEMP ADJ BLD: ABNORMAL MM[HG]
PH BLDA: 7.29 PH UNITS (ref 7.35–7.45)
PH, TEMP CORRECTED: ABNORMAL
PHOSPHATE SERPL-MCNC: 3.9 MG/DL (ref 2.5–4.5)
PHOSPHATE SERPL-MCNC: 4.1 MG/DL (ref 2.5–4.5)
PLATELET # BLD AUTO: 219 10*3/MM3 (ref 140–450)
PLATELET # BLD AUTO: 268 10*3/MM3 (ref 140–450)
PMV BLD AUTO: 12.1 FL (ref 6–12)
PMV BLD AUTO: 12.3 FL (ref 6–12)
PO2 BLDA: 87 MM HG (ref 83–108)
PO2 TEMP ADJ BLD: ABNORMAL MM[HG]
POTASSIUM SERPL-SCNC: 4.1 MMOL/L (ref 3.5–5.2)
POTASSIUM SERPL-SCNC: 4.5 MMOL/L (ref 3.5–5.2)
POTASSIUM SERPL-SCNC: 4.9 MMOL/L (ref 3.5–5.2)
PROT SERPL-MCNC: 6.1 G/DL (ref 6–8.5)
PROT SERPL-MCNC: 7.3 G/DL (ref 6–8.5)
RBC # BLD AUTO: 3.88 10*6/MM3 (ref 3.77–5.28)
RBC # BLD AUTO: 4.5 10*6/MM3 (ref 3.77–5.28)
SAO2 % BLDCOA: 96.3 % (ref 94–99)
SARS-COV-2 RNA RESP QL NAA+PROBE: NOT DETECTED
SODIUM SERPL-SCNC: 134 MMOL/L (ref 136–145)
SODIUM SERPL-SCNC: 135 MMOL/L (ref 136–145)
SODIUM SERPL-SCNC: 136 MMOL/L (ref 136–145)
TROPONIN T DELTA: 0 NG/L
TROPONIN T SERPL HS-MCNC: 11 NG/L
VENTILATOR MODE: ABNORMAL
WBC NRBC COR # BLD: 10.14 10*3/MM3 (ref 3.4–10.8)
WBC NRBC COR # BLD: 13.82 10*3/MM3 (ref 3.4–10.8)

## 2023-05-09 PROCEDURE — 82009 KETONE BODYS QUAL: CPT | Performed by: PHYSICIAN ASSISTANT

## 2023-05-09 PROCEDURE — 96365 THER/PROPH/DIAG IV INF INIT: CPT

## 2023-05-09 PROCEDURE — 99285 EMERGENCY DEPT VISIT HI MDM: CPT

## 2023-05-09 PROCEDURE — C9803 HOPD COVID-19 SPEC COLLECT: HCPCS

## 2023-05-09 PROCEDURE — 82375 ASSAY CARBOXYHB QUANT: CPT

## 2023-05-09 PROCEDURE — G0378 HOSPITAL OBSERVATION PER HR: HCPCS

## 2023-05-09 PROCEDURE — 82948 REAGENT STRIP/BLOOD GLUCOSE: CPT

## 2023-05-09 PROCEDURE — 96372 THER/PROPH/DIAG INJ SC/IM: CPT

## 2023-05-09 PROCEDURE — 84484 ASSAY OF TROPONIN QUANT: CPT | Performed by: STUDENT IN AN ORGANIZED HEALTH CARE EDUCATION/TRAINING PROGRAM

## 2023-05-09 PROCEDURE — 84100 ASSAY OF PHOSPHORUS: CPT | Performed by: EMERGENCY MEDICINE

## 2023-05-09 PROCEDURE — 96366 THER/PROPH/DIAG IV INF ADDON: CPT

## 2023-05-09 PROCEDURE — 71045 X-RAY EXAM CHEST 1 VIEW: CPT | Performed by: RADIOLOGY

## 2023-05-09 PROCEDURE — 83036 HEMOGLOBIN GLYCOSYLATED A1C: CPT | Performed by: EMERGENCY MEDICINE

## 2023-05-09 PROCEDURE — 36600 WITHDRAWAL OF ARTERIAL BLOOD: CPT

## 2023-05-09 PROCEDURE — 84100 ASSAY OF PHOSPHORUS: CPT | Performed by: STUDENT IN AN ORGANIZED HEALTH CARE EDUCATION/TRAINING PROGRAM

## 2023-05-09 PROCEDURE — 83050 HGB METHEMOGLOBIN QUAN: CPT

## 2023-05-09 PROCEDURE — 85025 COMPLETE CBC W/AUTO DIFF WBC: CPT | Performed by: EMERGENCY MEDICINE

## 2023-05-09 PROCEDURE — 80053 COMPREHEN METABOLIC PANEL: CPT | Performed by: PHYSICIAN ASSISTANT

## 2023-05-09 PROCEDURE — 36415 COLL VENOUS BLD VENIPUNCTURE: CPT

## 2023-05-09 PROCEDURE — 85025 COMPLETE CBC W/AUTO DIFF WBC: CPT | Performed by: PHYSICIAN ASSISTANT

## 2023-05-09 PROCEDURE — 71045 X-RAY EXAM CHEST 1 VIEW: CPT

## 2023-05-09 PROCEDURE — 84484 ASSAY OF TROPONIN QUANT: CPT | Performed by: EMERGENCY MEDICINE

## 2023-05-09 PROCEDURE — 83605 ASSAY OF LACTIC ACID: CPT | Performed by: STUDENT IN AN ORGANIZED HEALTH CARE EDUCATION/TRAINING PROGRAM

## 2023-05-09 PROCEDURE — 82805 BLOOD GASES W/O2 SATURATION: CPT

## 2023-05-09 PROCEDURE — 83735 ASSAY OF MAGNESIUM: CPT | Performed by: STUDENT IN AN ORGANIZED HEALTH CARE EDUCATION/TRAINING PROGRAM

## 2023-05-09 PROCEDURE — 83930 ASSAY OF BLOOD OSMOLALITY: CPT | Performed by: EMERGENCY MEDICINE

## 2023-05-09 PROCEDURE — 25010000002 HEPARIN (PORCINE) PER 1000 UNITS: Performed by: STUDENT IN AN ORGANIZED HEALTH CARE EDUCATION/TRAINING PROGRAM

## 2023-05-09 PROCEDURE — 83735 ASSAY OF MAGNESIUM: CPT | Performed by: EMERGENCY MEDICINE

## 2023-05-09 PROCEDURE — 87636 SARSCOV2 & INF A&B AMP PRB: CPT | Performed by: PHYSICIAN ASSISTANT

## 2023-05-09 PROCEDURE — 93005 ELECTROCARDIOGRAM TRACING: CPT | Performed by: EMERGENCY MEDICINE

## 2023-05-09 PROCEDURE — 96361 HYDRATE IV INFUSION ADD-ON: CPT

## 2023-05-09 PROCEDURE — 99223 1ST HOSP IP/OBS HIGH 75: CPT | Performed by: STUDENT IN AN ORGANIZED HEALTH CARE EDUCATION/TRAINING PROGRAM

## 2023-05-09 RX ORDER — SODIUM CHLORIDE 0.9 % (FLUSH) 0.9 %
10 SYRINGE (ML) INJECTION EVERY 12 HOURS SCHEDULED
Status: DISCONTINUED | OUTPATIENT
Start: 2023-05-09 | End: 2023-05-11 | Stop reason: HOSPADM

## 2023-05-09 RX ORDER — INSULIN ASPART 100 [IU]/ML
22-40 INJECTION, SOLUTION INTRAVENOUS; SUBCUTANEOUS
Status: ON HOLD | COMMUNITY
End: 2023-05-11 | Stop reason: SDUPTHER

## 2023-05-09 RX ORDER — DEXTROSE, SODIUM CHLORIDE, AND POTASSIUM CHLORIDE 5; .45; .15 G/100ML; G/100ML; G/100ML
150 INJECTION INTRAVENOUS CONTINUOUS PRN
Status: DISCONTINUED | OUTPATIENT
Start: 2023-05-09 | End: 2023-05-11 | Stop reason: HOSPADM

## 2023-05-09 RX ORDER — GLUCAGON 1 MG/ML
1 KIT INJECTION
Status: DISCONTINUED | OUTPATIENT
Start: 2023-05-09 | End: 2023-05-11 | Stop reason: HOSPADM

## 2023-05-09 RX ORDER — POLYETHYLENE GLYCOL 3350 17 G/17G
17 POWDER, FOR SOLUTION ORAL DAILY PRN
Status: DISCONTINUED | OUTPATIENT
Start: 2023-05-09 | End: 2023-05-11 | Stop reason: HOSPADM

## 2023-05-09 RX ORDER — SODIUM CHLORIDE AND POTASSIUM CHLORIDE 300; 900 MG/100ML; MG/100ML
250 INJECTION, SOLUTION INTRAVENOUS CONTINUOUS PRN
Status: DISCONTINUED | OUTPATIENT
Start: 2023-05-09 | End: 2023-05-11 | Stop reason: HOSPADM

## 2023-05-09 RX ORDER — SODIUM CHLORIDE 9 MG/ML
250 INJECTION, SOLUTION INTRAVENOUS CONTINUOUS PRN
Status: DISCONTINUED | OUTPATIENT
Start: 2023-05-09 | End: 2023-05-11 | Stop reason: HOSPADM

## 2023-05-09 RX ORDER — L.ACID,PARA/B.BIFIDUM/S.THERM 8B CELL
1 CAPSULE ORAL DAILY
Status: CANCELLED | OUTPATIENT
Start: 2023-05-10

## 2023-05-09 RX ORDER — INSULIN LISPRO 100 [IU]/ML
22-40 INJECTION, SOLUTION INTRAVENOUS; SUBCUTANEOUS
Status: CANCELLED | OUTPATIENT
Start: 2023-05-10

## 2023-05-09 RX ORDER — SODIUM CHLORIDE 9 MG/ML
40 INJECTION, SOLUTION INTRAVENOUS AS NEEDED
Status: DISCONTINUED | OUTPATIENT
Start: 2023-05-09 | End: 2023-05-11 | Stop reason: HOSPADM

## 2023-05-09 RX ORDER — SODIUM CHLORIDE 0.9 % (FLUSH) 0.9 %
10 SYRINGE (ML) INJECTION AS NEEDED
Status: DISCONTINUED | OUTPATIENT
Start: 2023-05-09 | End: 2023-05-11 | Stop reason: HOSPADM

## 2023-05-09 RX ORDER — PANTOPRAZOLE SODIUM 40 MG/1
40 TABLET, DELAYED RELEASE ORAL
Status: DISCONTINUED | OUTPATIENT
Start: 2023-05-10 | End: 2023-05-11 | Stop reason: HOSPADM

## 2023-05-09 RX ORDER — ONDANSETRON 4 MG/1
4 TABLET, ORALLY DISINTEGRATING ORAL EVERY 12 HOURS PRN
Status: DISCONTINUED | OUTPATIENT
Start: 2023-05-09 | End: 2023-05-11 | Stop reason: HOSPADM

## 2023-05-09 RX ORDER — SODIUM CHLORIDE AND POTASSIUM CHLORIDE 150; 450 MG/100ML; MG/100ML
250 INJECTION, SOLUTION INTRAVENOUS CONTINUOUS PRN
Status: DISCONTINUED | OUTPATIENT
Start: 2023-05-09 | End: 2023-05-11 | Stop reason: HOSPADM

## 2023-05-09 RX ORDER — CETIRIZINE HYDROCHLORIDE 10 MG/1
10 TABLET ORAL DAILY
Status: DISCONTINUED | OUTPATIENT
Start: 2023-05-10 | End: 2023-05-11 | Stop reason: HOSPADM

## 2023-05-09 RX ORDER — DEXTROSE MONOHYDRATE 25 G/50ML
10-50 INJECTION, SOLUTION INTRAVENOUS
Status: DISCONTINUED | OUTPATIENT
Start: 2023-05-09 | End: 2023-05-11 | Stop reason: HOSPADM

## 2023-05-09 RX ORDER — NITROGLYCERIN 0.4 MG/1
0.4 TABLET SUBLINGUAL
Status: DISCONTINUED | OUTPATIENT
Start: 2023-05-09 | End: 2023-05-11 | Stop reason: HOSPADM

## 2023-05-09 RX ORDER — DEXTROSE, SODIUM CHLORIDE, AND POTASSIUM CHLORIDE 5; .9; .15 G/100ML; G/100ML; G/100ML
150 INJECTION INTRAVENOUS CONTINUOUS PRN
Status: DISCONTINUED | OUTPATIENT
Start: 2023-05-09 | End: 2023-05-11 | Stop reason: HOSPADM

## 2023-05-09 RX ORDER — SODIUM CHLORIDE AND POTASSIUM CHLORIDE 150; 900 MG/100ML; MG/100ML
250 INJECTION, SOLUTION INTRAVENOUS CONTINUOUS PRN
Status: DISCONTINUED | OUTPATIENT
Start: 2023-05-09 | End: 2023-05-11 | Stop reason: HOSPADM

## 2023-05-09 RX ORDER — PAROXETINE 10 MG/1
10 TABLET, FILM COATED ORAL EVERY MORNING
Status: DISCONTINUED | OUTPATIENT
Start: 2023-05-10 | End: 2023-05-11 | Stop reason: HOSPADM

## 2023-05-09 RX ORDER — CHOLECALCIFEROL (VITAMIN D3) 125 MCG
5 CAPSULE ORAL NIGHTLY PRN
Status: DISCONTINUED | OUTPATIENT
Start: 2023-05-09 | End: 2023-05-11 | Stop reason: HOSPADM

## 2023-05-09 RX ORDER — MAGNESIUM SULFATE HEPTAHYDRATE 40 MG/ML
2 INJECTION, SOLUTION INTRAVENOUS AS NEEDED
Status: DISCONTINUED | OUTPATIENT
Start: 2023-05-09 | End: 2023-05-11 | Stop reason: HOSPADM

## 2023-05-09 RX ORDER — GARLIC EXTRACT 500 MG
1 CAPSULE ORAL DAILY
Status: DISCONTINUED | OUTPATIENT
Start: 2023-05-10 | End: 2023-05-11 | Stop reason: HOSPADM

## 2023-05-09 RX ORDER — TIZANIDINE 4 MG/1
2 TABLET ORAL EVERY 6 HOURS PRN
Status: DISCONTINUED | OUTPATIENT
Start: 2023-05-09 | End: 2023-05-11 | Stop reason: HOSPADM

## 2023-05-09 RX ORDER — HYDROCODONE BITARTRATE AND ACETAMINOPHEN 5; 325 MG/1; MG/1
1 TABLET ORAL EVERY 8 HOURS PRN
Status: DISCONTINUED | OUTPATIENT
Start: 2023-05-09 | End: 2023-05-11 | Stop reason: HOSPADM

## 2023-05-09 RX ORDER — DEXTROSE, SODIUM CHLORIDE, AND POTASSIUM CHLORIDE 5; .45; .3 G/100ML; G/100ML; G/100ML
150 INJECTION INTRAVENOUS CONTINUOUS PRN
Status: DISCONTINUED | OUTPATIENT
Start: 2023-05-09 | End: 2023-05-11 | Stop reason: HOSPADM

## 2023-05-09 RX ORDER — DEXTROSE AND SODIUM CHLORIDE 5; .45 G/100ML; G/100ML
150 INJECTION, SOLUTION INTRAVENOUS CONTINUOUS PRN
Status: DISCONTINUED | OUTPATIENT
Start: 2023-05-09 | End: 2023-05-11 | Stop reason: HOSPADM

## 2023-05-09 RX ORDER — DIPHENOXYLATE HYDROCHLORIDE AND ATROPINE SULFATE 2.5; .025 MG/1; MG/1
1 TABLET ORAL DAILY
COMMUNITY

## 2023-05-09 RX ORDER — NICOTINE POLACRILEX 4 MG
15 LOZENGE BUCCAL
Status: DISCONTINUED | OUTPATIENT
Start: 2023-05-09 | End: 2023-05-11 | Stop reason: HOSPADM

## 2023-05-09 RX ORDER — SODIUM CHLORIDE 450 MG/100ML
250 INJECTION, SOLUTION INTRAVENOUS CONTINUOUS PRN
Status: DISCONTINUED | OUTPATIENT
Start: 2023-05-09 | End: 2023-05-11 | Stop reason: HOSPADM

## 2023-05-09 RX ORDER — AMOXICILLIN 250 MG
2 CAPSULE ORAL NIGHTLY
Status: DISCONTINUED | OUTPATIENT
Start: 2023-05-09 | End: 2023-05-11 | Stop reason: HOSPADM

## 2023-05-09 RX ORDER — POTASSIUM CHLORIDE, DEXTROSE MONOHYDRATE AND SODIUM CHLORIDE 300; 5; 900 MG/100ML; G/100ML; MG/100ML
150 INJECTION, SOLUTION INTRAVENOUS CONTINUOUS PRN
Status: DISCONTINUED | OUTPATIENT
Start: 2023-05-09 | End: 2023-05-11 | Stop reason: HOSPADM

## 2023-05-09 RX ORDER — DIPHENOXYLATE HYDROCHLORIDE AND ATROPINE SULFATE 2.5; .025 MG/1; MG/1
1 TABLET ORAL DAILY
Status: DISCONTINUED | OUTPATIENT
Start: 2023-05-10 | End: 2023-05-11 | Stop reason: HOSPADM

## 2023-05-09 RX ORDER — OMEPRAZOLE 20 MG/1
20 CAPSULE, DELAYED RELEASE ORAL 2 TIMES DAILY
COMMUNITY

## 2023-05-09 RX ORDER — HEPARIN SODIUM 5000 [USP'U]/ML
5000 INJECTION, SOLUTION INTRAVENOUS; SUBCUTANEOUS EVERY 8 HOURS SCHEDULED
Status: DISCONTINUED | OUTPATIENT
Start: 2023-05-09 | End: 2023-05-10

## 2023-05-09 RX ORDER — DEXTROSE AND SODIUM CHLORIDE 5; .9 G/100ML; G/100ML
150 INJECTION, SOLUTION INTRAVENOUS CONTINUOUS PRN
Status: DISCONTINUED | OUTPATIENT
Start: 2023-05-09 | End: 2023-05-11 | Stop reason: HOSPADM

## 2023-05-09 RX ORDER — MAGNESIUM SULFATE HEPTAHYDRATE 40 MG/ML
4 INJECTION, SOLUTION INTRAVENOUS AS NEEDED
Status: DISCONTINUED | OUTPATIENT
Start: 2023-05-09 | End: 2023-05-11 | Stop reason: HOSPADM

## 2023-05-09 RX ORDER — DEXTROSE MONOHYDRATE 25 G/50ML
25 INJECTION, SOLUTION INTRAVENOUS
Status: DISCONTINUED | OUTPATIENT
Start: 2023-05-09 | End: 2023-05-11 | Stop reason: HOSPADM

## 2023-05-09 RX ORDER — ACETAMINOPHEN 500 MG
500 TABLET ORAL EVERY 6 HOURS PRN
Status: DISCONTINUED | OUTPATIENT
Start: 2023-05-09 | End: 2023-05-11 | Stop reason: HOSPADM

## 2023-05-09 RX ADMIN — INSULIN HUMAN 2.8 UNITS/HR: 1 INJECTION, SOLUTION INTRAVENOUS at 21:57

## 2023-05-09 RX ADMIN — INSULIN HUMAN 3.5 UNITS/HR: 1 INJECTION, SOLUTION INTRAVENOUS at 22:59

## 2023-05-09 RX ADMIN — INSULIN HUMAN 1.5 UNITS/HR: 1 INJECTION, SOLUTION INTRAVENOUS at 18:56

## 2023-05-09 RX ADMIN — Medication 10 ML: at 20:34

## 2023-05-09 RX ADMIN — Medication 10 ML: at 20:35

## 2023-05-09 RX ADMIN — HEPARIN SODIUM 5000 UNITS: 5000 INJECTION INTRAVENOUS; SUBCUTANEOUS at 21:58

## 2023-05-09 RX ADMIN — DOCUSATE SODIUM 50 MG AND SENNOSIDES 8.6 MG 2 TABLET: 8.6; 5 TABLET, FILM COATED ORAL at 20:34

## 2023-05-09 RX ADMIN — SODIUM CHLORIDE 1000 ML: 9 INJECTION, SOLUTION INTRAVENOUS at 11:45

## 2023-05-09 RX ADMIN — SODIUM CHLORIDE 1000 ML: 9 INJECTION, SOLUTION INTRAVENOUS at 15:48

## 2023-05-09 RX ADMIN — POTASSIUM CHLORIDE, DEXTROSE MONOHYDRATE AND SODIUM CHLORIDE 150 ML/HR: 150; 5; 450 INJECTION, SOLUTION INTRAVENOUS at 18:25

## 2023-05-09 NOTE — ED PROVIDER NOTES
"Subjective   History of Present Illness  Pt states that she has been without long acting insulin for the last week and that her meter reads \"hi\". Pt states that she took 32 units of fast acting insulin at 1000. Pt glucose checked in triage, meter reads 420.  Pt pmhx- asthma, dm, scoliosis, ibs, gerd, pancreatitis in 2017.   Surgical hx includes btl, cholecystectomy,       History provided by:  Patient   used: No    Hyperglycemia  Severity:  Mild  Onset quality:  Sudden  Timing:  Constant  Progression:  Worsening  Chronicity:  New  Diabetes status:  Controlled with insulin  Time since last antidiabetic medication:  1 week  Context: change in medication    Associated symptoms: dehydration and weakness        Review of Systems   Neurological: Positive for weakness.       Past Medical History:   Diagnosis Date   • Abnormal ECG    • Arthritis    • Asthma    • Diabetes mellitus    • GERD (gastroesophageal reflux disease)    • IBS (irritable bowel syndrome)    • Pancreatitis 2017   • Scoliosis        Allergies   Allergen Reactions   • Ibuprofen Hives and Other (See Comments)     \"smiley\"   • Latex Hives   • Vaseline [Petrolatum] Hives       Past Surgical History:   Procedure Laterality Date   • CARDIOVASCULAR STRESS TEST  11/27/2018    L. Cardiolite- Unable to walk. EF 69%. Negative.   • CHOLECYSTECTOMY     • COLONOSCOPY     • COLONOSCOPY N/A 9/21/2018    Procedure: COLONOSCOPY CPT CODE: 35761;  Surgeon: Micah Swartz MD;  Location: [facility];  Service: Gastroenterology   • CYST REMOVAL     • ECHO - CONVERTED  11/27/2018    EF 65%. No AS. Mild MR   • ENDOSCOPY     • ENDOSCOPY N/A 9/21/2018    Procedure: ESOPHAGOGASTRODUODENOSCOPY WITH BIOPSY CPT CODE: 95337;  Surgeon: Micah Swartz MD;  Location: [facility];  Service: Gastroenterology   • OTHER SURGICAL HISTORY  01/02/2019    Event monitor- baseline sinus, one 39 beat SVT, no V-tach, no AFib, no pauses   • TUBAL ABDOMINAL LIGATION     • " UPPER GASTROINTESTINAL ENDOSCOPY  2015 2018       Family History   Problem Relation Age of Onset   • Diabetes Other    • Cancer Other    • Lung cancer Other    • Heart attack Father         MI in his late 40's   • No Known Problems Sister        Social History     Socioeconomic History   • Marital status:    Tobacco Use   • Smoking status: Never   • Smokeless tobacco: Never   Substance and Sexual Activity   • Alcohol use: No   • Drug use: No   • Sexual activity: Yes     Partners: Male     Birth control/protection: Surgical           Objective   Physical Exam  Vitals and nursing note reviewed.   Constitutional:       Appearance: She is well-developed.   HENT:      Head: Normocephalic.   Cardiovascular:      Rate and Rhythm: Normal rate and regular rhythm.   Pulmonary:      Effort: Pulmonary effort is normal.      Breath sounds: Normal breath sounds.   Abdominal:      General: Bowel sounds are normal.      Palpations: Abdomen is soft.      Tenderness: There is no abdominal tenderness.   Musculoskeletal:         General: Normal range of motion.      Cervical back: Neck supple.   Skin:     General: Skin is warm and dry.   Neurological:      Mental Status: She is alert and oriented to person, place, and time.   Psychiatric:         Behavior: Behavior normal.         Thought Content: Thought content normal.         Judgment: Judgment normal.         Procedures           ED Course                                           MDM    Final diagnoses:   None       ED Disposition  ED Disposition     None          No follow-up provider specified.       Medication List      No changes were made to your prescriptions during this visit.        Range    COVID19 Not Detected Not Detected - Ref. Range    Influenza A PCR Not Detected Not Detected    Influenza B PCR Not Detected Not Detected   Comprehensive Metabolic Panel    Specimen: Arm, Right; Blood   Result Value Ref Range    Glucose 426 (C) 65 - 99 mg/dL    BUN 34 (H) 6 - 20 mg/dL    Creatinine 1.68 (H) 0.57 - 1.00 mg/dL    Sodium 134 (L) 136 - 145 mmol/L    Potassium 4.1 3.5 - 5.2 mmol/L    Chloride 97 (L) 98 - 107 mmol/L    CO2 9.8 (L) 22.0 - 29.0 mmol/L    Calcium 9.2 8.6 - 10.5 mg/dL    Total Protein 7.3 6.0 - 8.5 g/dL    Albumin 4.0 3.5 - 5.2 g/dL    ALT (SGPT) 16 1 - 33 U/L    AST (SGOT) 15 1 - 32 U/L    Alkaline Phosphatase 115 39 - 117 U/L    Total Bilirubin 0.8 0.0 - 1.2 mg/dL    Globulin 3.3 gm/dL    A/G Ratio 1.2 g/dL    BUN/Creatinine Ratio 20.2 7.0 - 25.0    Anion Gap 27.2 (H) 5.0 - 15.0 mmol/L    eGFR 37.4 (L) >60.0 mL/min/1.73   CBC Auto Differential    Specimen: Arm, Right; Blood   Result Value Ref Range    WBC 13.82 (H) 3.40 - 10.80 10*3/mm3    RBC 4.50 3.77 - 5.28 10*6/mm3    Hemoglobin 13.5 12.0 - 15.9 g/dL    Hematocrit 42.5 34.0 - 46.6 %    MCV 94.4 79.0 - 97.0 fL    MCH 30.0 26.6 - 33.0 pg    MCHC 31.8 31.5 - 35.7 g/dL    RDW 13.3 12.3 - 15.4 %    RDW-SD 45.6 37.0 - 54.0 fl    MPV 12.3 (H) 6.0 - 12.0 fL    Platelets 268 140 - 450 10*3/mm3    Neutrophil % 82.8 (H) 42.7 - 76.0 %    Lymphocyte % 12.2 (L) 19.6 - 45.3 %    Monocyte % 3.1 (L) 5.0 - 12.0 %    Eosinophil % 0.9 0.3 - 6.2 %    Basophil % 0.6 0.0 - 1.5 %    Immature Grans % 0.4 0.0 - 0.5 %    Neutrophils, Absolute 11.45 (H) 1.70 - 7.00 10*3/mm3    Lymphocytes, Absolute 1.68 0.70 - 3.10 10*3/mm3    Monocytes, Absolute 0.43 0.10 - 0.90 10*3/mm3    Eosinophils, Absolute 0.12 0.00 - 0.40 10*3/mm3    Basophils, Absolute 0.08 0.00 - 0.20 10*3/mm3    Immature Grans, Absolute 0.06 (H) 0.00 - 0.05 10*3/mm3    nRBC 0.0 0.0 - 0.2 /100 WBC   Acetone    Specimen: Arm, Left; Blood   Result Value Ref Range    Acetone Moderate (C) Negative    Comprehensive Metabolic Panel    Specimen: Arm, Left; Blood   Result Value Ref Range    Glucose 187 (H) 65 - 99 mg/dL    BUN 27 (H) 6 - 20 mg/dL    Creatinine 1.28 (H) 0.57 - 1.00 mg/dL    Sodium 136 136 - 145 mmol/L    Potassium 4.5 3.5 - 5.2 mmol/L    Chloride 105 98 - 107 mmol/L    CO2 13.0 (L) 22.0 - 29.0 mmol/L    Calcium 8.4 (L) 8.6 - 10.5 mg/dL    Total Protein 6.1 6.0 - 8.5 g/dL    Albumin 3.5 3.5 - 5.2 g/dL    ALT (SGPT) 13 1 - 33 U/L    AST (SGOT) 15 1 - 32 U/L    Alkaline Phosphatase 93 39 - 117 U/L    Total Bilirubin 0.6 0.0 - 1.2 mg/dL    Globulin 2.6 gm/dL    A/G Ratio 1.3 g/dL    BUN/Creatinine Ratio 21.1 7.0 - 25.0    Anion Gap 18.0 (H) 5.0 - 15.0 mmol/L    eGFR 51.8 (L) >60.0 mL/min/1.73   Phosphorus    Specimen: Arm, Left; Blood   Result Value Ref Range    Phosphorus 4.1 2.5 - 4.5 mg/dL   Magnesium    Specimen: Arm, Left; Blood   Result Value Ref Range    Magnesium 2.1 1.6 - 2.6 mg/dL   Osmolality, Serum    Specimen: Arm, Left; Blood   Result Value Ref Range    Osmolality 303 (H) 275 - 300 mOsm/kg   Hemoglobin A1c    Specimen: Arm, Left; Blood   Result Value Ref Range    Hemoglobin A1C 8.80 (H) 4.80 - 5.60 %   High Sensitivity Troponin T    Specimen: Arm, Left; Blood   Result Value Ref Range    HS Troponin T 11 (H) <10 ng/L   Basic Metabolic Panel    Specimen: Blood   Result Value Ref Range    Glucose 247 (H) 65 - 99 mg/dL    BUN 28 (H) 6 - 20 mg/dL    Creatinine 1.27 (H) 0.57 - 1.00 mg/dL    Sodium 135 (L) 136 - 145 mmol/L    Potassium 4.9 3.5 - 5.2 mmol/L    Chloride 103 98 - 107 mmol/L    CO2 11.3 (L) 22.0 - 29.0 mmol/L    Calcium 8.4 (L) 8.6 - 10.5 mg/dL    BUN/Creatinine Ratio 22.0 7.0 - 25.0    Anion Gap 20.7 (H) 5.0 - 15.0 mmol/L    eGFR 52.3 (L) >60.0 mL/min/1.73   Basic Metabolic Panel    Specimen: Blood   Result Value Ref Range    Glucose 199 (H) 65 - 99 mg/dL    BUN 24 (H) 6 - 20 mg/dL    Creatinine 1.13 (H) 0.57 - 1.00 mg/dL    Sodium 137 136 - 145 mmol/L    Potassium 3.6 3.5 - 5.2  mmol/L    Chloride 107 98 - 107 mmol/L    CO2 13.8 (L) 22.0 - 29.0 mmol/L    Calcium 8.3 (L) 8.6 - 10.5 mg/dL    BUN/Creatinine Ratio 21.2 7.0 - 25.0    Anion Gap 16.2 (H) 5.0 - 15.0 mmol/L    eGFR 60.1 >60.0 mL/min/1.73   Magnesium    Specimen: Blood   Result Value Ref Range    Magnesium 2.0 1.6 - 2.6 mg/dL   Magnesium    Specimen: Blood   Result Value Ref Range    Magnesium 1.9 1.6 - 2.6 mg/dL   Phosphorus    Specimen: Blood   Result Value Ref Range    Phosphorus 3.9 2.5 - 4.5 mg/dL   Phosphorus    Specimen: Blood   Result Value Ref Range    Phosphorus 2.6 2.5 - 4.5 mg/dL   CBC Auto Differential    Specimen: Arm, Left; Blood   Result Value Ref Range    WBC 10.14 3.40 - 10.80 10*3/mm3    RBC 3.88 3.77 - 5.28 10*6/mm3    Hemoglobin 11.7 (L) 12.0 - 15.9 g/dL    Hematocrit 35.9 34.0 - 46.6 %    MCV 92.5 79.0 - 97.0 fL    MCH 30.2 26.6 - 33.0 pg    MCHC 32.6 31.5 - 35.7 g/dL    RDW 13.5 12.3 - 15.4 %    RDW-SD 45.0 37.0 - 54.0 fl    MPV 12.1 (H) 6.0 - 12.0 fL    Platelets 219 140 - 450 10*3/mm3    Neutrophil % 68.7 42.7 - 76.0 %    Lymphocyte % 23.5 19.6 - 45.3 %    Monocyte % 5.8 5.0 - 12.0 %    Eosinophil % 1.0 0.3 - 6.2 %    Basophil % 0.6 0.0 - 1.5 %    Immature Grans % 0.4 0.0 - 0.5 %    Neutrophils, Absolute 6.97 1.70 - 7.00 10*3/mm3    Lymphocytes, Absolute 2.38 0.70 - 3.10 10*3/mm3    Monocytes, Absolute 0.59 0.10 - 0.90 10*3/mm3    Eosinophils, Absolute 0.10 0.00 - 0.40 10*3/mm3    Basophils, Absolute 0.06 0.00 - 0.20 10*3/mm3    Immature Grans, Absolute 0.04 0.00 - 0.05 10*3/mm3    nRBC 0.0 0.0 - 0.2 /100 WBC   High Sensitivity Troponin T 2Hr    Specimen: Blood   Result Value Ref Range    HS Troponin T 11 (H) <10 ng/L    Troponin T Delta 0 >=-4 - <+4 ng/L   Lactic Acid, Plasma    Specimen: Blood   Result Value Ref Range    Lactate 1.4 0.5 - 2.0 mmol/L   Blood Gas, Arterial With Co-Ox    Specimen: Arterial Blood   Result Value Ref Range    Site Right Radial     Tao's Test Positive     pH, Arterial 7.289 (L)  7.350 - 7.450 pH units    pCO2, Arterial 29.4 (L) 35.0 - 45.0 mm Hg    pO2, Arterial 87.0 83.0 - 108.0 mm Hg    HCO3, Arterial 14.1 (L) 20.0 - 26.0 mmol/L    Base Excess, Arterial -11.2 (L) 0.0 - 2.0 mmol/L    O2 Saturation, Arterial 96.3 94.0 - 99.0 %    Hemoglobin, Blood Gas 11.8 (L) 13.5 - 17.5 g/dL    Hematocrit, Blood Gas 36.2 (L) 38.0 - 51.0 %    Oxyhemoglobin 95.0 94 - 99 %    Methemoglobin 0.80 0.00 - 3.00 %    Carboxyhemoglobin 0.6 0 - 5 %    A-a DO2 22.5 0.0 - 300.0 mmHg    CO2 Content 15.0 (L) 22 - 33 mmol/L    Barometric Pressure for Blood Gas 727 mmHg    Modality Room Air     FIO2 21 %    Ventilator Mode NA     Note Read back and acknowledge     Notified Who GUILLERMO TREADWELL // RN     Notified By RICHARD     Collected by [id number]     pH, Temp Corrected      pCO2, Temperature Corrected      pO2, Temperature Corrected     Basic Metabolic Panel    Specimen: Blood   Result Value Ref Range    Glucose 127 (H) 65 - 99 mg/dL    BUN 21 (H) 6 - 20 mg/dL    Creatinine 1.09 (H) 0.57 - 1.00 mg/dL    Sodium 139 136 - 145 mmol/L    Potassium 3.7 3.5 - 5.2 mmol/L    Chloride 108 (H) 98 - 107 mmol/L    CO2 17.7 (L) 22.0 - 29.0 mmol/L    Calcium 8.5 (L) 8.6 - 10.5 mg/dL    BUN/Creatinine Ratio 19.3 7.0 - 25.0    Anion Gap 13.3 5.0 - 15.0 mmol/L    eGFR 62.8 >60.0 mL/min/1.73   Magnesium    Specimen: Blood   Result Value Ref Range    Magnesium 2.5 1.6 - 2.6 mg/dL   Phosphorus    Specimen: Blood   Result Value Ref Range    Phosphorus 3.0 2.5 - 4.5 mg/dL   CBC (No Diff)    Specimen: Blood   Result Value Ref Range    WBC 8.46 3.40 - 10.80 10*3/mm3    RBC 3.62 (L) 3.77 - 5.28 10*6/mm3    Hemoglobin 10.7 (L) 12.0 - 15.9 g/dL    Hematocrit 34.0 34.0 - 46.6 %    MCV 93.9 79.0 - 97.0 fL    MCH 29.6 26.6 - 33.0 pg    MCHC 31.5 31.5 - 35.7 g/dL    RDW 13.6 12.3 - 15.4 %    RDW-SD 46.0 37.0 - 54.0 fl    MPV 12.0 6.0 - 12.0 fL    Platelets 209 140 - 450 10*3/mm3   Basic Metabolic Panel    Specimen: Blood   Result Value Ref Range    Glucose 170  (H) 65 - 99 mg/dL    BUN 18 6 - 20 mg/dL    Creatinine 1.01 (H) 0.57 - 1.00 mg/dL    Sodium 137 136 - 145 mmol/L    Potassium 4.1 3.5 - 5.2 mmol/L    Chloride 108 (H) 98 - 107 mmol/L    CO2 16.0 (L) 22.0 - 29.0 mmol/L    Calcium 8.4 (L) 8.6 - 10.5 mg/dL    BUN/Creatinine Ratio 17.8 7.0 - 25.0    Anion Gap 13.0 5.0 - 15.0 mmol/L    eGFR 68.8 >60.0 mL/min/1.73   Magnesium    Specimen: Blood   Result Value Ref Range    Magnesium 2.8 (H) 1.6 - 2.6 mg/dL   Phosphorus    Specimen: Blood   Result Value Ref Range    Phosphorus 3.4 2.5 - 4.5 mg/dL   Magnesium    Specimen: Blood   Result Value Ref Range    Magnesium 2.5 1.6 - 2.6 mg/dL   Phosphorus    Specimen: Blood   Result Value Ref Range    Phosphorus 2.4 (L) 2.5 - 4.5 mg/dL   Magnesium    Specimen: Blood   Result Value Ref Range    Magnesium 2.4 1.6 - 2.6 mg/dL   Phosphorus    Specimen: Blood   Result Value Ref Range    Phosphorus 2.8 2.5 - 4.5 mg/dL   Basic Metabolic Panel    Specimen: Blood   Result Value Ref Range    Glucose 153 (H) 65 - 99 mg/dL    BUN 16 6 - 20 mg/dL    Creatinine 1.15 (H) 0.57 - 1.00 mg/dL    Sodium 140 136 - 145 mmol/L    Potassium 3.7 3.5 - 5.2 mmol/L    Chloride 113 (H) 98 - 107 mmol/L    CO2 18.1 (L) 22.0 - 29.0 mmol/L    Calcium 8.5 (L) 8.6 - 10.5 mg/dL    BUN/Creatinine Ratio 13.9 7.0 - 25.0    Anion Gap 8.9 5.0 - 15.0 mmol/L    eGFR 58.9 (L) >60.0 mL/min/1.73   Magnesium    Specimen: Blood   Result Value Ref Range    Magnesium 2.1 1.6 - 2.6 mg/dL   Phosphorus    Specimen: Blood   Result Value Ref Range    Phosphorus 2.2 (L) 2.5 - 4.5 mg/dL   Magnesium    Specimen: Blood   Result Value Ref Range    Magnesium 2.1 1.6 - 2.6 mg/dL   Phosphorus    Specimen: Blood   Result Value Ref Range    Phosphorus 2.7 2.5 - 4.5 mg/dL   Magnesium    Specimen: Blood   Result Value Ref Range    Magnesium 2.1 1.6 - 2.6 mg/dL   Phosphorus    Specimen: Blood   Result Value Ref Range    Phosphorus 3.5 2.5 - 4.5 mg/dL   Basic Metabolic Panel    Specimen: Blood    Result Value Ref Range    Glucose 138 (H) 65 - 99 mg/dL    BUN 12 6 - 20 mg/dL    Creatinine 1.02 (H) 0.57 - 1.00 mg/dL    Sodium 141 136 - 145 mmol/L    Potassium 3.7 3.5 - 5.2 mmol/L    Chloride 111 (H) 98 - 107 mmol/L    CO2 18.4 (L) 22.0 - 29.0 mmol/L    Calcium 8.5 (L) 8.6 - 10.5 mg/dL    BUN/Creatinine Ratio 11.8 7.0 - 25.0    Anion Gap 11.6 5.0 - 15.0 mmol/L    eGFR 68.0 >60.0 mL/min/1.73   Magnesium    Specimen: Blood   Result Value Ref Range    Magnesium 2.1 1.6 - 2.6 mg/dL   Phosphorus    Specimen: Blood   Result Value Ref Range    Phosphorus 3.3 2.5 - 4.5 mg/dL   POC Glucose Once    Specimen: Blood   Result Value Ref Range    Glucose 420 (C) 70 - 130 mg/dL   POC Glucose Once    Specimen: Blood   Result Value Ref Range    Glucose 255 (H) 70 - 130 mg/dL   POC Glucose Once    Specimen: Blood   Result Value Ref Range    Glucose 169 (H) 70 - 130 mg/dL   POC Glucose Once    Specimen: Blood   Result Value Ref Range    Glucose 207 (H) 70 - 130 mg/dL   POC Glucose Once    Specimen: Blood   Result Value Ref Range    Glucose 235 (H) 70 - 130 mg/dL   POC Glucose Once    Specimen: Blood   Result Value Ref Range    Glucose 221 (H) 70 - 130 mg/dL   POC Glucose Once    Specimen: Blood   Result Value Ref Range    Glucose 204 (H) 70 - 130 mg/dL   POC Glucose Once    Specimen: Blood   Result Value Ref Range    Glucose 203 (H) 70 - 130 mg/dL   POC Glucose Once    Specimen: Blood   Result Value Ref Range    Glucose 192 (H) 70 - 130 mg/dL   POC Glucose Once    Specimen: Blood   Result Value Ref Range    Glucose 182 (H) 70 - 130 mg/dL   POC Glucose Once    Specimen: Blood   Result Value Ref Range    Glucose 149 (H) 70 - 130 mg/dL   POC Glucose Once    Specimen: Blood   Result Value Ref Range    Glucose 136 (H) 70 - 130 mg/dL   POC Glucose Once    Specimen: Blood   Result Value Ref Range    Glucose 116 70 - 130 mg/dL   POC Glucose Once    Specimen: Blood   Result Value Ref Range    Glucose 144 (H) 70 - 130 mg/dL   POC Glucose  "Once    Specimen: Blood   Result Value Ref Range    Glucose 125 70 - 130 mg/dL   POC Glucose Once    Specimen: Blood   Result Value Ref Range    Glucose 170 (H) 70 - 130 mg/dL   POC Glucose Once    Specimen: Blood   Result Value Ref Range    Glucose 150 (H) 70 - 130 mg/dL   POC Glucose Once    Specimen: Blood   Result Value Ref Range    Glucose 152 (H) 70 - 130 mg/dL   POC Glucose Once    Specimen: Blood   Result Value Ref Range    Glucose 228 (H) 70 - 130 mg/dL   POC Glucose Once    Specimen: Blood   Result Value Ref Range    Glucose 175 (H) 70 - 130 mg/dL   POC Glucose Once    Specimen: Blood   Result Value Ref Range    Glucose 134 (H) 70 - 130 mg/dL   POC Glucose Once    Specimen: Blood   Result Value Ref Range    Glucose 174 (H) 70 - 130 mg/dL   POC Glucose Once    Specimen: Blood   Result Value Ref Range    Glucose 336 (H) 70 - 130 mg/dL   POC Glucose Once    Specimen: Blood   Result Value Ref Range    Glucose 240 (H) 70 - 130 mg/dL   POC Glucose Once    Specimen: Blood   Result Value Ref Range    Glucose 150 (H) 70 - 130 mg/dL   POC Glucose Once    Specimen: Blood   Result Value Ref Range    Glucose 268 (H) 70 - 130 mg/dL   ECG 12 Lead   Result Value Ref Range    QT Interval 360 ms    QTC Interval 457 ms              XR Chest AP   Final Result   No radiographic evidence of acute cardiac or pulmonary disease.       This report was finalized on 5/9/2023 6:12 PM by Dr. Black Elaine MD.                                       Medical Decision Making  Pt states that she has been without long acting insulin for the last week and that her meter reads \"hi\". Pt states that she took 32 units of fast acting insulin at 1000. Pt glucose checked in triage, meter reads 420.  Pt pmhx- asthma, dm, scoliosis, ibs, gerd, pancreatitis in 2017.   Surgical hx includes btl, cholecystectomy    Hyperglycemia: acute illness or injury  Amount and/or Complexity of Data Reviewed  Labs: ordered.      Risk  Decision regarding " hospitalization.    Risk Details: Pt was discussed with Dr Mcwilliams will admit        Final diagnoses:   Hyperglycemia       ED Disposition  ED Disposition     ED Disposition   Decision to Admit    Condition   --    Comment   Level of Care: Progressive Care [20]   Diagnosis: DKA (diabetic ketoacidosis) [228587]   Admitting Physician: CAROL BARROS [[id number]]   Attending Physician: CAROL BARROS [[id number]]   Certification: I Certify That Inpatient Hospital Services Are Medically Necessary For Greater Than 2 Midnights               Lan Guerra, APRN  [address] [address]  [phone]      Within 2 weeks for DKA, insulin refill         Medication List      New Prescriptions    B-D ULTRAFINE III SHORT PEN 31G X 8 MM misc  Generic drug: Insulin Pen Needle  Use to inject insulin 4 (Four) Times a Day.     Levemir FlexPen 100 UNIT/ML injection  Generic drug: insulin detemir  Inject 75 Units under the skin into the appropriate area as directed Every Night for 30 days.     NovoLOG FlexPen 100 UNIT/ML solution pen-injector sc pen  Generic drug: insulin aspart  Inject 22-40 Units under the skin into the appropriate area as directed 3 (Three) Times a Day Before Meals for 30 days.  Replaces: Insulin Aspart 100 UNIT/ML injection        Stop    Insulin Aspart 100 UNIT/ML injection  Commonly known as: novoLOG  Replaced by: NovoLOG FlexPen 100 UNIT/ML solution pen-injector sc pen     Lantus SoloStar 100 UNIT/ML injection pen  Generic drug: Insulin Glargine           Where to Get Your Medications      These medications were sent to [address] [address]    Hours: 8AM-6PM Mon-Fri Phone: [phone]   · B-D ULTRAFINE III SHORT PEN 31G X 8 MM misc  · Levemir FlexPen 100 UNIT/ML injection  · NovoLOG FlexPen 100 UNIT/ML solution pen-injector sc pen          Gerda So PA  05/17/23 6820

## 2023-05-09 NOTE — H&P
[facility] HOSPITALIST HISTORY AND PHYSICAL    Patient Identification:  Name:  Marquita Vernon  Age:  48 y.o.  Sex:  female  :  1975  MRN:  [id number]   Visit Number:  [id number]  Admit Date: 2023   Room number:  404/04  Primary Care Physician:  Lan Guerra, ALIE    Date of Admission: 2023     Subjective     Chief complaint:    Chief Complaint   Patient presents with   • Hyperglycemia     History of presenting illness:  48 y.o. female who was admitted on 2023 for concern of dka     Marquita Vernon has relevant PMH of IDDM presenting with progressive fatigue, nausea w/o vomiting and hyperglycemia. Patient states that she follows locally with endocrinology, uses 75 units lantus qhs with novalog prandial SSI. Patient reports her [employer] insurance unfortunately changed coverage for her long acting insulin resulting in her being unable to receive basal insulin coverage over last few days, now nearly one week in total. During this time her glucose readings have been rising and she reports associated nausea, poor appetite but no abd pain and no vomiting. NO fever, chills, or cough. No dysuria or frequency. Has dexcom for monitoring with infrequent hypoglycemic events, symptomatic when fsbg <60md/gl. Patient has been on Jardiance long term and recently increased dose 10mg to 25mg, has also been on ozempic but has been taking for 6-8 months without recent adjustment in dosing or administration. Has lost ~20lbs since ozempic started.    On arrival to ED she was mildly tachycardic and found to have FSBG >400. At home she gave herself 32u aspart prior to travel to ED. At time of my eval she had been given 2L IVF but insulin gtt not yet initiated. Patient resting comfortably in bed and reports this is her first episode of DKA. Decision made to admit to [facility] unit given insulin gtt for further management.    Prior to admission I discussed patient's presentation and management with  attending ER physician and verbal handoff received.  ---------------------------------------------------------------------------------------------------------------------   A thorough systems based relevant ROS was asked and was negative except as noted above.  ---------------------------------------------------------------------------------------------------------------------   Past Medical History:   Diagnosis Date   • Abnormal ECG    • Arthritis    • Asthma    • Diabetes mellitus    • GERD (gastroesophageal reflux disease)    • IBS (irritable bowel syndrome)    • Pancreatitis 2017   • Scoliosis      Past Surgical History:   Procedure Laterality Date   • CARDIOVASCULAR STRESS TEST  11/27/2018    L. Cardiolite- Unable to walk. EF 69%. Negative.   • CHOLECYSTECTOMY     • COLONOSCOPY     • COLONOSCOPY N/A 9/21/2018    Procedure: COLONOSCOPY CPT CODE: 38614;  Surgeon: Micah Swartz MD;  Location: [facility];  Service: Gastroenterology   • CYST REMOVAL     • ECHO - CONVERTED  11/27/2018    EF 65%. No AS. Mild MR   • ENDOSCOPY     • ENDOSCOPY N/A 9/21/2018    Procedure: ESOPHAGOGASTRODUODENOSCOPY WITH BIOPSY CPT CODE: 39472;  Surgeon: Micah Swartz MD;  Location: [facility];  Service: Gastroenterology   • OTHER SURGICAL HISTORY  01/02/2019    Event monitor- baseline sinus, one 39 beat SVT, no V-tach, no AFib, no pauses   • TUBAL ABDOMINAL LIGATION     • UPPER GASTROINTESTINAL ENDOSCOPY  2015 2018     Family History   Problem Relation Age of Onset   • Diabetes Other    • Cancer Other    • Lung cancer Other    • Heart attack Father         MI in his late 40's   • No Known Problems Sister      Social History     Socioeconomic History   • Marital status:    Tobacco Use   • Smoking status: Never   • Smokeless tobacco: Never   Substance and Sexual Activity   • Alcohol use: No   • Drug use: No   • Sexual activity: Yes     Partners: Male     Birth control/protection: Surgical      ---------------------------------------------------------------------------------------------------------------------   Allergies:  Ibuprofen, Latex, and Vaseline [petrolatum]  ---------------------------------------------------------------------------------------------------------------------   Medications below are reported home medications pulling from within the system; at this time, these medications have not been reconciled unless otherwise specified and are in the verification process for further verifcation as current home medications.      Prior to Admission Medications     Prescriptions Last Dose Informant Patient Reported? Taking?    Ozempic, 1 MG/DOSE, 4 MG/3ML solution pen-injector   No No    INJECT 1 MILLIGRAM SUBCUTANEOUSLY EVERY WEEK    Blood Glucose Monitoring Suppl (FreeStyle Lite) w/Device kit   No No    1 each 2 (Two) Times a Day Before Meals.    Continuous Blood Gluc Sensor (Dexcom G6 Sensor)   No No    Every 10 (Ten) Days.    Continuous Blood Gluc Transmit (Dexcom G6 Transmitter) misc   No No    1 each Every 3 (Three) Months.    Easy Comfort Pen Needles 31G X 8 MM misc   No No    USE TO INJECT INSULIN FOUR TIMES A DAY AS DIRECTED.    empagliflozin (Jardiance) 25 MG tablet tablet   No No    Take 1 tablet by mouth Daily.    Glucagon (Baqsimi One Pack) 3 MG/DOSE powder   No No    3 mg into the nostril(s) as directed by provider Daily As Needed (severe hypoglycemia).    glucose blood (FREESTYLE LITE) test strip   No No    TEST 2 TIMES DAILY AS NEEDED    HYDROcodone-acetaminophen (NORCO) 5-325 MG per tablet   Yes No    Take 1 tablet by mouth Every 8 (Eight) Hours As Needed. Only takes PRN    insulin aspart (NovoLOG FlexPen) 100 UNIT/ML solution pen-injector sc pen   No No    Inject 24 Units under the skin into the appropriate area as directed 3 (Three) Times a Day With Meals.    Insulin Glargine (Lantus SoloStar) 100 UNIT/ML injection pen   No No    Inject 75 Units under the skin into the  "appropriate area as directed Every Night.    Insulin Pen Needle (Pen Needles 3/16\") 31G X 5 MM misc   No No    To inject insulin 4 times daily.    lactobacillus acidophilus (RISAQUAD) capsule capsule   No No    Take 1 capsule by mouth Daily.    Loratadine 10 MG capsule   Yes No    Take 10 mg by mouth Daily.    metoprolol tartrate (LOPRESSOR) 25 MG tablet   No No    Take 1 tablet by mouth 2 (Two) Times a Day.    Multiple Vitamins-Minerals (MULTIVITAMIN ADULT PO)   Yes No    Take 1 tablet by mouth Daily.    omeprazole (priLOSEC) 20 MG capsule  Self No No    Take 1 capsule by mouth 2 (Two) Times a Day Before Meals.    Patient taking differently:  Take 20 mg by mouth Daily.    PARoxetine (PAXIL) 10 MG tablet   Yes No    Take 10 mg by mouth Every Morning.    tiZANidine (ZANAFLEX) 4 MG tablet   Yes No    Take 4 mg by mouth Every 6 (Six) Hours As Needed for Muscle Spasms.        Objective     Vital Signs:  Temp:  [97.5 °F (36.4 °C)] 97.5 °F (36.4 °C)  Heart Rate:  [] 99  Resp:  [18] 18  BP: ()/(66-84) 96/66    Mean Arterial Pressure (Non-Invasive) for the past 24 hrs (Last 3 readings):   Noninvasive MAP (mmHg)   05/09/23 1600 77   05/09/23 1415 97   05/09/23 1230 80     SpO2:  [98 %-99 %] 99 %  on   ;   Device (Oxygen Therapy): room air  Body mass index is 39.06 kg/m².    Wt Readings from Last 3 Encounters:   05/09/23 110 kg (242 lb)   01/24/23 111 kg (244 lb 3.2 oz)   09/06/22 116 kg (256 lb 3.2 oz)      ----------------------------------------------------------------------------------------------------------------------  Physical Exam  Vitals and nursing note reviewed.   Constitutional:       General: She is not in acute distress.     Appearance: She is obese.   HENT:      Head: Normocephalic and atraumatic.      Mouth/Throat:      Mouth: Mucous membranes are dry.      Pharynx: Oropharynx is clear.   Eyes:      General: No scleral icterus.     Extraocular Movements: Extraocular movements intact. "   Cardiovascular:      Rate and Rhythm: Normal rate and regular rhythm.      Pulses: Normal pulses.      Heart sounds: No murmur heard.  Pulmonary:      Effort: Pulmonary effort is normal. No respiratory distress.   Abdominal:      Palpations: Abdomen is soft.      Tenderness: There is no abdominal tenderness.   Musculoskeletal:      Right lower leg: No edema.      Left lower leg: No edema.   Skin:     General: Skin is warm and dry.      Capillary Refill: Capillary refill takes less than 2 seconds.   Neurological:      General: No focal deficit present.      Mental Status: She is alert and oriented to person, place, and time.   Psychiatric:         Mood and Affect: Mood normal.         Behavior: Behavior normal.       --------------------------------------------------------------------------------------------------------------------  LABS:    CBC and coagulation:  Results from last 7 days   Lab Units 05/09/23  1717 05/09/23  1135   WBC 10*3/mm3 10.14 13.82*   HEMOGLOBIN g/dL 11.7* 13.5   HEMATOCRIT % 35.9 42.5   MCV fL 92.5 94.4   MCHC g/dL 32.6 31.8   PLATELETS 10*3/mm3 219 268     Acid/base balance:  Results from last 7 days   Lab Units 05/09/23  1816   PH, ARTERIAL pH units 7.289*   PO2 ART mm Hg 87.0   PCO2, ARTERIAL mm Hg 29.4*   HCO3 ART mmol/L 14.1*     Renal and electrolytes:  Results from last 7 days   Lab Units 05/09/23  1717 05/09/23  1135   SODIUM mmol/L 136 134*   POTASSIUM mmol/L 4.5 4.1   MAGNESIUM mg/dL 2.1  --    CHLORIDE mmol/L 105 97*   CO2 mmol/L 13.0* 9.8*   BUN mg/dL 27* 34*   CREATININE mg/dL 1.28* 1.68*   CALCIUM mg/dL 8.4* 9.2   PHOSPHORUS mg/dL 4.1  --    GLUCOSE mg/dL 187* 426*     Estimated Creatinine Clearance: 67.5 mL/min (A) (by C-G formula based on SCr of 1.28 mg/dL (H)).    Liver and pancreatic function:  Results from last 7 days   Lab Units 05/09/23  1717 05/09/23  1135   ALBUMIN g/dL 3.5 4.0   BILIRUBIN mg/dL 0.6 0.8   ALK PHOS U/L 93 115   AST (SGOT) U/L 15 15   ALT (SGPT) U/L 13  16     Endocrine function:  Lab Results   Component Value Date    HGBA1C 8.3 01/24/2023     Point of care bedside glucose levels:  Results from last 7 days   Lab Units 05/09/23  1721 05/09/23  1306 05/09/23  1119   GLUCOSE mg/dL 169* 255* 420*     No results found for: TSH, FREET4  Cardiac:  Results from last 7 days   Lab Units 05/09/23  1717   HSTROP T ng/L 11*       Cultures:  Lab Results   Component Value Date    COLORU Yellow 04/04/2023    CLARITYU Clear 04/04/2023    PHUR 6.0 04/04/2023    PROTEINUR <4.0 04/04/2023    GLUCOSEU >=1000 mg/dL (3+) (A) 04/04/2023    KETONESU Negative 04/04/2023    BLOODU Negative 04/04/2023    NITRITEU Negative 04/04/2023    LEUKOCYTESUR Negative 04/04/2023    BILIRUBINUR Negative 04/04/2023    UROBILINOGEN 0.2 E.U./dL 04/04/2023    RBCUA 0-2 04/04/2023    WBCUA 0-2 04/04/2023    BACTERIA None Seen 04/04/2023     Microbiology Results (last 10 days)     Procedure Component Value - Date/Time    COVID-19 and FLU A/B PCR - Swab, Nasopharynx [[id number]]  (Normal) Collected: 05/09/23 1719    Lab Status: Final result Specimen: Swab from Nasopharynx Updated: 05/09/23 1749     COVID19 Not Detected     Influenza A PCR Not Detected     Influenza B PCR Not Detected    Narrative:      Fact sheet for providers: https://www.fda.gov/media/253888/download    Fact sheet for patients: https://www.fda.gov/media/596059/download    Test performed by PCR.          Lab Results   Component Value Date    PREGTESTUR Negative 08/09/2018     Pain Management Panel         Latest Ref Rng & Units 4/4/2023 3/28/2019   Pain Management Panel   Creatinine, Urine mg/dL 31.6      31.6      Amphetamine, Urine Qual Negative  Negative     Barbiturates Screen, Urine Negative  Negative     Benzodiazepine Screen, Urine Negative  Negative     Buprenorphine, Screen, Urine Negative  Negative     Cocaine Screen, Urine Negative  Negative     Methadone Screen , Urine Negative  Negative           Multiple values from one day are  sorted in reverse-chronological order             I have personally looked at the labs and they are summarized above.  ----------------------------------------------------------------------------------------------------------------------  Detailed radiology reports for the last 24 hours:    Imaging Results (Last 24 Hours)     Procedure Component Value Units Date/Time    XR Chest AP [[id number]] Collected: 05/09/23 1812     Updated: 05/09/23 1815    Narrative:      XR CHEST AP-     CLINICAL INDICATION: DKA        COMPARISON: 08/13/2022      TECHNIQUE: Single frontal view of the chest.     FINDINGS:      LUNGS: Lungs are adequately aerated.      HEART AND MEDIASTINUM: Heart and mediastinal contours are unremarkable        SKELETON: Bony and soft tissue structures are unremarkable.     Metallic artifact in the midline, presumably the patient's bra       Impression:      No radiographic evidence of acute cardiac or pulmonary disease.     This report was finalized on 5/9/2023 6:12 PM by Dr. Black Elaine MD.           Final impressions for the last 30 days of radiology reports:    XR Chest AP    Result Date: 5/9/2023  No radiographic evidence of acute cardiac or pulmonary disease.  This report was finalized on 5/9/2023 6:12 PM by Dr. Black Elaine MD.        I have personally looked at the radiology images, my personal interpretation is as follows:    ECG NSR w/o acute ischemic st-segment changes    CXR without any focal opacifications or effusions    Assessment & Plan       #NIDDM c/b DKA secondary to insulin deficiency  #Possible coexisting starvation ketosis  #AGMA w/compensatory resp alkalosis  #Glucosuria  -Patient following locally with endocrinology, last A1c Jan 2023 8.3 w/repeat pending. Has dexcom with 29% in range readings, 69% either high or very high and <1% very low  -Unfortunately patient's presentation of DKA likely induced by recent changes associated with insurance coverage of basal insulin resulting in  gap of coverage  -s/p 2L IVF bolus in ED, 32u self administered insulin at home with BG already <200mg/dl prior to starting insulin gtt here, given quick correction possible coexistence of starvation ketosis, has been unable to eat regular meals for last 5-6 days  -Glucommander DKA insulin gtt protocol ordered and will admit to pcu  -Trend bmp overnight q4hr w/phosphorus given possible multifactorial ketosis, replace electrolytes per protocol  -In am if gap closed and Bicarb >18 will give diet followed by long acting insulin and transition off gtt  -Pharmacy consulted for insulin pricing to be provided prior to discharge  -Repeat a1c pend and already has close outpatient endo f/u  -DM educator consulted  -Hold SGLT2 and ozempic    #SIRS response secondary to above (, WBC count elevated >11K)  -Secondary to DKA w/wbc and tachycardia resolved s/p IVF boluses    #MARIBEL secondary to dehydration  -Cr improving after IVF      VTE Prophylaxis:   Mechanical Order History:     None      Pharmalogical Order History:      Ordered     Dose Route Frequency Stop    Signed and Held  heparin (porcine) 5000 UNIT/ML injection 5,000 Units         5,000 Units SC Every 8 Hours Scheduled --              The patient is high risk due to the following diagnoses/reasons:  DKA requiring insulin gtt    Admission Status:  I certify that this patient requires inpatient hospitalization for greater than 2 midnights in INPATIENT status.  I anticipate there to be the need for care which can only be reasonably provided in a hospital setting such as possible need for aggressive/expedited ancillary services and/or consultation services, IV medications, close physician monitoring, and/or procedures. In such, I feel patient’s risk for adverse outcomes and need for care warrant INPATIENT evaluation and predict the patient’s care encounter to likely last beyond 2 midnights.    Code Status and Medical Interventions:   Ordered at: 05/09/23 1808     Code  Status (Patient has no pulse and is not breathing):    CPR (Attempt to Resuscitate)     Medical Interventions (Patient has pulse or is breathing):    Full Support        Disposition: Admit to pcu    Juwan Seo MD  [facility]  05/09/23  18:25 EDT

## 2023-05-10 LAB
ANION GAP SERPL CALCULATED.3IONS-SCNC: 13 MMOL/L (ref 5–15)
ANION GAP SERPL CALCULATED.3IONS-SCNC: 13.3 MMOL/L (ref 5–15)
ANION GAP SERPL CALCULATED.3IONS-SCNC: 16.2 MMOL/L (ref 5–15)
ANION GAP SERPL CALCULATED.3IONS-SCNC: 8.9 MMOL/L (ref 5–15)
BUN SERPL-MCNC: 16 MG/DL (ref 6–20)
BUN SERPL-MCNC: 18 MG/DL (ref 6–20)
BUN SERPL-MCNC: 21 MG/DL (ref 6–20)
BUN SERPL-MCNC: 24 MG/DL (ref 6–20)
BUN/CREAT SERPL: 13.9 (ref 7–25)
BUN/CREAT SERPL: 17.8 (ref 7–25)
BUN/CREAT SERPL: 19.3 (ref 7–25)
BUN/CREAT SERPL: 21.2 (ref 7–25)
CALCIUM SPEC-SCNC: 8.3 MG/DL (ref 8.6–10.5)
CALCIUM SPEC-SCNC: 8.4 MG/DL (ref 8.6–10.5)
CALCIUM SPEC-SCNC: 8.5 MG/DL (ref 8.6–10.5)
CALCIUM SPEC-SCNC: 8.5 MG/DL (ref 8.6–10.5)
CHLORIDE SERPL-SCNC: 107 MMOL/L (ref 98–107)
CHLORIDE SERPL-SCNC: 108 MMOL/L (ref 98–107)
CHLORIDE SERPL-SCNC: 108 MMOL/L (ref 98–107)
CHLORIDE SERPL-SCNC: 113 MMOL/L (ref 98–107)
CO2 SERPL-SCNC: 13.8 MMOL/L (ref 22–29)
CO2 SERPL-SCNC: 16 MMOL/L (ref 22–29)
CO2 SERPL-SCNC: 17.7 MMOL/L (ref 22–29)
CO2 SERPL-SCNC: 18.1 MMOL/L (ref 22–29)
CREAT SERPL-MCNC: 1.01 MG/DL (ref 0.57–1)
CREAT SERPL-MCNC: 1.09 MG/DL (ref 0.57–1)
CREAT SERPL-MCNC: 1.13 MG/DL (ref 0.57–1)
CREAT SERPL-MCNC: 1.15 MG/DL (ref 0.57–1)
DEPRECATED RDW RBC AUTO: 46 FL (ref 37–54)
EGFRCR SERPLBLD CKD-EPI 2021: 58.9 ML/MIN/1.73
EGFRCR SERPLBLD CKD-EPI 2021: 60.1 ML/MIN/1.73
EGFRCR SERPLBLD CKD-EPI 2021: 62.8 ML/MIN/1.73
EGFRCR SERPLBLD CKD-EPI 2021: 68.8 ML/MIN/1.73
ERYTHROCYTE [DISTWIDTH] IN BLOOD BY AUTOMATED COUNT: 13.6 % (ref 12.3–15.4)
GLUCOSE BLDC GLUCOMTR-MCNC: 116 MG/DL (ref 70–130)
GLUCOSE BLDC GLUCOMTR-MCNC: 125 MG/DL (ref 70–130)
GLUCOSE BLDC GLUCOMTR-MCNC: 134 MG/DL (ref 70–130)
GLUCOSE BLDC GLUCOMTR-MCNC: 136 MG/DL (ref 70–130)
GLUCOSE BLDC GLUCOMTR-MCNC: 144 MG/DL (ref 70–130)
GLUCOSE BLDC GLUCOMTR-MCNC: 149 MG/DL (ref 70–130)
GLUCOSE BLDC GLUCOMTR-MCNC: 150 MG/DL (ref 70–130)
GLUCOSE BLDC GLUCOMTR-MCNC: 150 MG/DL (ref 70–130)
GLUCOSE BLDC GLUCOMTR-MCNC: 152 MG/DL (ref 70–130)
GLUCOSE BLDC GLUCOMTR-MCNC: 170 MG/DL (ref 70–130)
GLUCOSE BLDC GLUCOMTR-MCNC: 174 MG/DL (ref 70–130)
GLUCOSE BLDC GLUCOMTR-MCNC: 175 MG/DL (ref 70–130)
GLUCOSE BLDC GLUCOMTR-MCNC: 182 MG/DL (ref 70–130)
GLUCOSE BLDC GLUCOMTR-MCNC: 192 MG/DL (ref 70–130)
GLUCOSE BLDC GLUCOMTR-MCNC: 203 MG/DL (ref 70–130)
GLUCOSE BLDC GLUCOMTR-MCNC: 204 MG/DL (ref 70–130)
GLUCOSE BLDC GLUCOMTR-MCNC: 221 MG/DL (ref 70–130)
GLUCOSE BLDC GLUCOMTR-MCNC: 228 MG/DL (ref 70–130)
GLUCOSE BLDC GLUCOMTR-MCNC: 240 MG/DL (ref 70–130)
GLUCOSE BLDC GLUCOMTR-MCNC: 336 MG/DL (ref 70–130)
GLUCOSE SERPL-MCNC: 127 MG/DL (ref 65–99)
GLUCOSE SERPL-MCNC: 153 MG/DL (ref 65–99)
GLUCOSE SERPL-MCNC: 170 MG/DL (ref 65–99)
GLUCOSE SERPL-MCNC: 199 MG/DL (ref 65–99)
HCT VFR BLD AUTO: 34 % (ref 34–46.6)
HGB BLD-MCNC: 10.7 G/DL (ref 12–15.9)
MAGNESIUM SERPL-MCNC: 1.9 MG/DL (ref 1.6–2.6)
MAGNESIUM SERPL-MCNC: 2.1 MG/DL (ref 1.6–2.6)
MAGNESIUM SERPL-MCNC: 2.4 MG/DL (ref 1.6–2.6)
MAGNESIUM SERPL-MCNC: 2.5 MG/DL (ref 1.6–2.6)
MAGNESIUM SERPL-MCNC: 2.5 MG/DL (ref 1.6–2.6)
MAGNESIUM SERPL-MCNC: 2.8 MG/DL (ref 1.6–2.6)
MCH RBC QN AUTO: 29.6 PG (ref 26.6–33)
MCHC RBC AUTO-ENTMCNC: 31.5 G/DL (ref 31.5–35.7)
MCV RBC AUTO: 93.9 FL (ref 79–97)
OSMOLALITY SERPL: 303 MOSM/KG (ref 275–300)
PHOSPHATE SERPL-MCNC: 2.2 MG/DL (ref 2.5–4.5)
PHOSPHATE SERPL-MCNC: 2.4 MG/DL (ref 2.5–4.5)
PHOSPHATE SERPL-MCNC: 2.6 MG/DL (ref 2.5–4.5)
PHOSPHATE SERPL-MCNC: 2.8 MG/DL (ref 2.5–4.5)
PHOSPHATE SERPL-MCNC: 3 MG/DL (ref 2.5–4.5)
PHOSPHATE SERPL-MCNC: 3.4 MG/DL (ref 2.5–4.5)
PLATELET # BLD AUTO: 209 10*3/MM3 (ref 140–450)
PMV BLD AUTO: 12 FL (ref 6–12)
POTASSIUM SERPL-SCNC: 3.6 MMOL/L (ref 3.5–5.2)
POTASSIUM SERPL-SCNC: 3.7 MMOL/L (ref 3.5–5.2)
POTASSIUM SERPL-SCNC: 3.7 MMOL/L (ref 3.5–5.2)
POTASSIUM SERPL-SCNC: 4.1 MMOL/L (ref 3.5–5.2)
QT INTERVAL: 360 MS
QTC INTERVAL: 457 MS
RBC # BLD AUTO: 3.62 10*6/MM3 (ref 3.77–5.28)
SODIUM SERPL-SCNC: 137 MMOL/L (ref 136–145)
SODIUM SERPL-SCNC: 137 MMOL/L (ref 136–145)
SODIUM SERPL-SCNC: 139 MMOL/L (ref 136–145)
SODIUM SERPL-SCNC: 140 MMOL/L (ref 136–145)
WBC NRBC COR # BLD: 8.46 10*3/MM3 (ref 3.4–10.8)

## 2023-05-10 PROCEDURE — 96372 THER/PROPH/DIAG INJ SC/IM: CPT

## 2023-05-10 PROCEDURE — 83735 ASSAY OF MAGNESIUM: CPT | Performed by: STUDENT IN AN ORGANIZED HEALTH CARE EDUCATION/TRAINING PROGRAM

## 2023-05-10 PROCEDURE — 94761 N-INVAS EAR/PLS OXIMETRY MLT: CPT

## 2023-05-10 PROCEDURE — 82948 REAGENT STRIP/BLOOD GLUCOSE: CPT

## 2023-05-10 PROCEDURE — 99232 SBSQ HOSP IP/OBS MODERATE 35: CPT | Performed by: STUDENT IN AN ORGANIZED HEALTH CARE EDUCATION/TRAINING PROGRAM

## 2023-05-10 PROCEDURE — 0 MAGNESIUM SULFATE 4 GM/100ML SOLUTION: Performed by: STUDENT IN AN ORGANIZED HEALTH CARE EDUCATION/TRAINING PROGRAM

## 2023-05-10 PROCEDURE — 94799 UNLISTED PULMONARY SVC/PX: CPT

## 2023-05-10 PROCEDURE — 63710000001 INSULIN LISPRO (HUMAN) PER 5 UNITS: Performed by: STUDENT IN AN ORGANIZED HEALTH CARE EDUCATION/TRAINING PROGRAM

## 2023-05-10 PROCEDURE — G0378 HOSPITAL OBSERVATION PER HR: HCPCS

## 2023-05-10 PROCEDURE — 25010000002 HEPARIN (PORCINE) PER 1000 UNITS: Performed by: STUDENT IN AN ORGANIZED HEALTH CARE EDUCATION/TRAINING PROGRAM

## 2023-05-10 PROCEDURE — 84100 ASSAY OF PHOSPHORUS: CPT | Performed by: STUDENT IN AN ORGANIZED HEALTH CARE EDUCATION/TRAINING PROGRAM

## 2023-05-10 PROCEDURE — 63710000001 INSULIN GLARGINE PER 5 UNITS: Performed by: STUDENT IN AN ORGANIZED HEALTH CARE EDUCATION/TRAINING PROGRAM

## 2023-05-10 PROCEDURE — 96366 THER/PROPH/DIAG IV INF ADDON: CPT

## 2023-05-10 PROCEDURE — 25010000002 ENOXAPARIN PER 10 MG: Performed by: STUDENT IN AN ORGANIZED HEALTH CARE EDUCATION/TRAINING PROGRAM

## 2023-05-10 PROCEDURE — 80048 BASIC METABOLIC PNL TOTAL CA: CPT | Performed by: STUDENT IN AN ORGANIZED HEALTH CARE EDUCATION/TRAINING PROGRAM

## 2023-05-10 PROCEDURE — 85027 COMPLETE CBC AUTOMATED: CPT | Performed by: STUDENT IN AN ORGANIZED HEALTH CARE EDUCATION/TRAINING PROGRAM

## 2023-05-10 RX ORDER — MAGNESIUM SULFATE HEPTAHYDRATE 40 MG/ML
4 INJECTION, SOLUTION INTRAVENOUS ONCE
Status: COMPLETED | OUTPATIENT
Start: 2023-05-10 | End: 2023-05-10

## 2023-05-10 RX ORDER — DEXTROSE MONOHYDRATE 25 G/50ML
25 INJECTION, SOLUTION INTRAVENOUS
Status: DISCONTINUED | OUTPATIENT
Start: 2023-05-10 | End: 2023-05-11 | Stop reason: HOSPADM

## 2023-05-10 RX ORDER — ENOXAPARIN SODIUM 100 MG/ML
40 INJECTION SUBCUTANEOUS EVERY 24 HOURS
Status: DISCONTINUED | OUTPATIENT
Start: 2023-05-10 | End: 2023-05-11 | Stop reason: HOSPADM

## 2023-05-10 RX ORDER — NICOTINE POLACRILEX 4 MG
15 LOZENGE BUCCAL
Status: DISCONTINUED | OUTPATIENT
Start: 2023-05-10 | End: 2023-05-11 | Stop reason: HOSPADM

## 2023-05-10 RX ORDER — INSULIN LISPRO 100 [IU]/ML
4-24 INJECTION, SOLUTION INTRAVENOUS; SUBCUTANEOUS
Status: DISCONTINUED | OUTPATIENT
Start: 2023-05-10 | End: 2023-05-11 | Stop reason: HOSPADM

## 2023-05-10 RX ADMIN — Medication 10 ML: at 20:36

## 2023-05-10 RX ADMIN — HYDROCODONE BITARTRATE AND ACETAMINOPHEN 1 TABLET: 5; 325 TABLET ORAL at 20:38

## 2023-05-10 RX ADMIN — CETIRIZINE HYDROCHLORIDE 10 MG: 10 TABLET, FILM COATED ORAL at 08:15

## 2023-05-10 RX ADMIN — INSULIN HUMAN 2.9 UNITS/HR: 1 INJECTION, SOLUTION INTRAVENOUS at 02:57

## 2023-05-10 RX ADMIN — ACETAMINOPHEN 500 MG: 500 TABLET ORAL at 14:56

## 2023-05-10 RX ADMIN — INSULIN GLARGINE 50 UNITS: 100 INJECTION, SOLUTION SUBCUTANEOUS at 09:46

## 2023-05-10 RX ADMIN — INSULIN HUMAN 1.7 UNITS/HR: 1 INJECTION, SOLUTION INTRAVENOUS at 03:59

## 2023-05-10 RX ADMIN — INSULIN HUMAN 4.2 UNITS/HR: 1 INJECTION, SOLUTION INTRAVENOUS at 07:06

## 2023-05-10 RX ADMIN — HEPARIN SODIUM 5000 UNITS: 5000 INJECTION INTRAVENOUS; SUBCUTANEOUS at 06:02

## 2023-05-10 RX ADMIN — Medication 10 ML: at 08:15

## 2023-05-10 RX ADMIN — INSULIN LISPRO 4 UNITS: 100 INJECTION, SOLUTION INTRAVENOUS; SUBCUTANEOUS at 12:58

## 2023-05-10 RX ADMIN — INSULIN HUMAN 3.4 UNITS/HR: 1 INJECTION, SOLUTION INTRAVENOUS at 02:00

## 2023-05-10 RX ADMIN — Medication 1 CAPSULE: at 08:15

## 2023-05-10 RX ADMIN — PAROXETINE 10 MG: 10 TABLET, FILM COATED ORAL at 06:06

## 2023-05-10 RX ADMIN — Medication 1 TABLET: at 08:15

## 2023-05-10 RX ADMIN — POTASSIUM CHLORIDE, DEXTROSE MONOHYDRATE AND SODIUM CHLORIDE 150 ML/HR: 150; 5; 450 INJECTION, SOLUTION INTRAVENOUS at 07:16

## 2023-05-10 RX ADMIN — INSULIN LISPRO 4 UNITS: 100 INJECTION, SOLUTION INTRAVENOUS; SUBCUTANEOUS at 09:49

## 2023-05-10 RX ADMIN — MAGNESIUM SULFATE HEPTAHYDRATE 4 G: 40 INJECTION, SOLUTION INTRAVENOUS at 02:00

## 2023-05-10 RX ADMIN — INSULIN HUMAN 2 UNITS/HR: 1 INJECTION, SOLUTION INTRAVENOUS at 06:02

## 2023-05-10 RX ADMIN — INSULIN HUMAN 2.6 UNITS/HR: 1 INJECTION, SOLUTION INTRAVENOUS at 05:00

## 2023-05-10 RX ADMIN — ENOXAPARIN SODIUM 40 MG: 40 INJECTION SUBCUTANEOUS at 09:51

## 2023-05-10 RX ADMIN — INSULIN LISPRO 16 UNITS: 100 INJECTION, SOLUTION INTRAVENOUS; SUBCUTANEOUS at 18:00

## 2023-05-10 RX ADMIN — INSULIN HUMAN 4.7 UNITS/HR: 1 INJECTION, SOLUTION INTRAVENOUS at 00:59

## 2023-05-10 RX ADMIN — PANTOPRAZOLE SODIUM 40 MG: 40 TABLET, DELAYED RELEASE ORAL at 06:02

## 2023-05-10 RX ADMIN — POTASSIUM CHLORIDE, DEXTROSE MONOHYDRATE AND SODIUM CHLORIDE 150 ML/HR: 150; 5; 450 INJECTION, SOLUTION INTRAVENOUS at 00:59

## 2023-05-10 NOTE — PLAN OF CARE
Goal Outcome Evaluation:         Patient remains on room air, reporting no shortness of breath, shoulder and chest rise and fall with respirations. Transitioned off of insulin drip this shift and remains on sliding scale along with long acting insulin. Patient is sitting up in her bed on her smartphone, alert and oriented and is in no pain. VSS.

## 2023-05-10 NOTE — PROGRESS NOTES
[facility] HOSPITALIST PROGRESS NOTE     Patient Identification:  Name:  Marquita Vernon  Age:  48 y.o.  Sex:  female  :  1975  MRN:  [id number]  Visit Number:  [id number]  ROOM: [place] [address]     Primary Care Provider:  Lan Guerra APRN     Date of Admission: 2023    Length of stay in inpatient status:  1    Subjective     Chief Compliant:    Chief Complaint   Patient presents with   • Hyperglycemia       Patient with improvement in nausea this am requesting meal. Gap closed overnight and no hypoglycemia noted. Remained afebrile and without new complaints.        Objective     Current Hospital Meds:  Acidophilus/Pectin, 1 capsule, Oral, Daily  cetirizine, 10 mg, Oral, Daily  enoxaparin, 40 mg, Subcutaneous, Q24H  [START ON 2023] insulin glargine, 50 Units, Subcutaneous, Daily  insulin lispro, 4-24 Units, Subcutaneous, TID With Meals  multivitamin, 1 tablet, Oral, Daily  pantoprazole, 40 mg, Oral, Q AM  PARoxetine, 10 mg, Oral, QAM  senna-docusate sodium, 2 tablet, Oral, Nightly  sodium chloride, 10 mL, Intravenous, Q12H  sodium chloride, 10 mL, Intravenous, Q12H  sodium chloride, 10 mL, Intravenous, Q12H    dextrose 5 % and sodium chloride 0.45 %, 150 mL/hr  dextrose 5 % and sodium chloride 0.45 % with KCl 20 mEq/L, 150 mL/hr, Last Rate: 150 mL/hr (05/10/23 0716)  dextrose 5 % and sodium chloride 0.45 % with KCl 40 mEq/L, 150 mL/hr  dextrose 5 % and sodium chloride 0.9 %, 150 mL/hr  dextrose 5 % and sodium chloride 0.9 % with KCl 20 mEq, 150 mL/hr  dextrose 5% and sodium chloride 0.9% with KCl 40 mEq/L, 150 mL/hr  insulin, 0-100 Units/hr, Last Rate: 3.5 Units/hr (05/10/23 1143)  Pharmacy Consult,   custom IV KCl infusion builder, 250 mL/hr  sodium chloride, 250 mL/hr  sodium chloride 0.45 % with KCl 20 mEq, 250 mL/hr  sodium chloride, 250 mL/hr  sodium chloride 0.9 % with KCl 20 mEq, 250 mL/hr  sodium chloride 0.9 % with KCl 40 mEq/L, 250 mL/hr      Current Antimicrobial  Therapy:  Anti-Infectives (From admission, onward)    None        Current Diuretic Therapy:  Diuretics (From admission, onward)    None        ----------------------------------------------------------------------------------------------------------------------  Vital Signs:  Temp:  [97.6 °F (36.4 °C)-99.1 °F (37.3 °C)] 98.4 °F (36.9 °C)  Heart Rate:  [] 90  Resp:  [9-21] 18  BP: ()/(59-88) 106/66  SpO2:  [94 %-100 %] 99 %  on   ;   Device (Oxygen Therapy): room air  Body mass index is 39.25 kg/m².    Wt Readings from Last 3 Encounters:   05/10/23 110 kg (243 lb 2.7 oz)   01/24/23 111 kg (244 lb 3.2 oz)   09/06/22 116 kg (256 lb 3.2 oz)     Intake & Output (last 3 days)       05/07 0701  05/08 0700 05/08 0701  05/09 0700 05/09 0701  05/10 0700 05/10 0701  05/11 0700    P.O.   75 360    I.V. (mL/kg)   1832.5 (16.7) 785.5 (7.1)    IV Piggyback   2000     Total Intake(mL/kg)   3907.5 (35.5) 1145.5 (10.4)    Urine (mL/kg/hr)   500     Stool   0     Total Output   500     Net   +3407.5 +1145.5            Urine Unmeasured Occurrence   1 x 1 x    Stool Unmeasured Occurrence   2 x 1 x        Diet: Cardiac Diets, Diabetic Diets; Healthy Heart (2-3 Na+); Consistent Carbohydrate; Texture: Regular Texture (IDDSI 7); Fluid Consistency: Thin (IDDSI 0)  ----------------------------------------------------------------------------------------------------------------------  Physical Exam  Vitals and nursing note reviewed.   Constitutional:       General: She is not in acute distress.     Appearance: She is obese. She is not ill-appearing.   HENT:      Mouth/Throat:      Mouth: Mucous membranes are dry.      Pharynx: Oropharynx is clear.   Eyes:      General: No scleral icterus.     Extraocular Movements: Extraocular movements intact.   Cardiovascular:      Rate and Rhythm: Normal rate.      Pulses: Normal pulses.   Pulmonary:      Effort: Pulmonary effort is normal. No respiratory distress.   Abdominal:      General:  There is no distension.      Tenderness: There is no abdominal tenderness.   Musculoskeletal:      Right lower leg: No edema.      Left lower leg: No edema.   Skin:     General: Skin is warm and dry.      Capillary Refill: Capillary refill takes less than 2 seconds.   Neurological:      General: No focal deficit present.      Mental Status: She is alert and oriented to person, place, and time.   Psychiatric:         Mood and Affect: Mood normal.         Behavior: Behavior normal.       ----------------------------------------------------------------------------------------------------------------------    ----------------------------------------------------------------------------------------------------------------------  LABS:    CBC and coagulation:  Results from last 7 days   Lab Units 05/10/23  0005 05/09/23  1928 05/09/23  1717 05/09/23  1135   LACTATE mmol/L  --  1.4  --   --    WBC 10*3/mm3 8.46  --  10.14 13.82*   HEMOGLOBIN g/dL 10.7*  --  11.7* 13.5   HEMATOCRIT % 34.0  --  35.9 42.5   MCV fL 93.9  --  92.5 94.4   MCHC g/dL 31.5  --  32.6 31.8   PLATELETS 10*3/mm3 209  --  219 268     Acid/base balance:  Results from last 7 days   Lab Units 05/09/23  1816   PH, ARTERIAL pH units 7.289*   PO2 ART mm Hg 87.0   PCO2, ARTERIAL mm Hg 29.4*   HCO3 ART mmol/L 14.1*     Renal and electrolytes:  Results from last 7 days   Lab Units 05/10/23  1133 05/10/23  0730 05/10/23  0409 05/10/23  0005 05/09/23  1928 05/09/23  1717 05/09/23  1135   SODIUM mmol/L 140 137 139 137 135* 136 134*   POTASSIUM mmol/L 3.7 4.1 3.7 3.6 4.9 4.5 4.1   MAGNESIUM mg/dL 2.5 2.8* 2.5 1.9 2.0 2.1  --    CHLORIDE mmol/L 113* 108* 108* 107 103 105 97*   CO2 mmol/L 18.1* 16.0* 17.7* 13.8* 11.3* 13.0* 9.8*   BUN mg/dL 16 18 21* 24* 28* 27* 34*   CREATININE mg/dL 1.15* 1.01* 1.09* 1.13* 1.27* 1.28* 1.68*   CALCIUM mg/dL 8.5* 8.4* 8.5* 8.3* 8.4* 8.4* 9.2   PHOSPHORUS mg/dL 2.4* 3.4 3.0 2.6 3.9 4.1  --    GLUCOSE mg/dL 153* 170* 127* 199* 247* 187*  426*     Estimated Creatinine Clearance: 75.2 mL/min (A) (by C-G formula based on SCr of 1.15 mg/dL (H)).    Liver and pancreatic function:  Results from last 7 days   Lab Units 05/09/23  1717 05/09/23  1135   ALBUMIN g/dL 3.5 4.0   BILIRUBIN mg/dL 0.6 0.8   ALK PHOS U/L 93 115   AST (SGOT) U/L 15 15   ALT (SGPT) U/L 13 16     Endocrine function:  Lab Results   Component Value Date    HGBA1C 8.80 (H) 05/09/2023     Point of care bedside glucose levels:  Results from last 7 days   Lab Units 05/10/23  1250 05/10/23  1141 05/10/23  1038 05/10/23  1001 05/10/23  0914 05/10/23  0808 05/10/23  0704 05/10/23  0601 05/10/23  0459 05/10/23  0359 05/10/23  0256 05/10/23  0158 05/10/23  0059 05/09/23  2356   GLUCOSE mg/dL 174* 134* 175* 228* 152* 150* 170* 125 144* 116 136* 149* 182* 192*     Glucose levels from the [facility]:  Results from last 7 days   Lab Units 05/10/23  1133 05/10/23  0730 05/10/23  0409 05/10/23  0005 05/09/23  1928 05/09/23  1717 05/09/23  1135   GLUCOSE mg/dL 153* 170* 127* 199* 247* 187* 426*     No results found for: TSH, FREET4  Cardiac:  Results from last 7 days   Lab Units 05/09/23  1928 05/09/23  1717   HSTROP T ng/L 11* 11*       Cultures:  Lab Results   Component Value Date    COLORU Yellow 04/04/2023    CLARITYU Clear 04/04/2023    PHUR 6.0 04/04/2023    PROTEINUR <4.0 04/04/2023    GLUCOSEU >=1000 mg/dL (3+) (A) 04/04/2023    KETONESU Negative 04/04/2023    BLOODU Negative 04/04/2023    NITRITEU Negative 04/04/2023    LEUKOCYTESUR Negative 04/04/2023    BILIRUBINUR Negative 04/04/2023    UROBILINOGEN 0.2 E.U./dL 04/04/2023    RBCUA 0-2 04/04/2023    WBCUA 0-2 04/04/2023    BACTERIA None Seen 04/04/2023     Microbiology Results (last 10 days)     Procedure Component Value - Date/Time    COVID-19 and FLU A/B PCR - Swab, Nasopharynx [[id number]]  (Normal) Collected: 05/09/23 1719    Lab Status: Final result Specimen: Swab from Nasopharynx Updated: 05/09/23 1749     COVID19 Not Detected     Influenza A  PCR Not Detected     Influenza B PCR Not Detected    Narrative:      Fact sheet for providers: https://www.fda.gov/media/978344/download    Fact sheet for patients: https://www.fda.gov/media/344475/download    Test performed by PCR.          Lab Results   Component Value Date    PREGTESTUR Negative 08/09/2018     Pain Management Panel         Latest Ref Rng & Units 4/4/2023 3/28/2019   Pain Management Panel   Creatinine, Urine mg/dL 31.6      31.6      Amphetamine, Urine Qual Negative  Negative     Barbiturates Screen, Urine Negative  Negative     Benzodiazepine Screen, Urine Negative  Negative     Buprenorphine, Screen, Urine Negative  Negative     Cocaine Screen, Urine Negative  Negative     Methadone Screen , Urine Negative  Negative           Multiple values from one day are sorted in reverse-chronological order             I have personally looked at the labs and they are summarized above.  ----------------------------------------------------------------------------------------------------------------------  Detailed radiology reports for the last 24 hours:    Imaging Results (Last 24 Hours)     Procedure Component Value Units Date/Time    XR Chest AP [[id number]] Collected: 05/09/23 1812     Updated: 05/09/23 1815    Narrative:      XR CHEST AP-     CLINICAL INDICATION: DKA        COMPARISON: 08/13/2022      TECHNIQUE: Single frontal view of the chest.     FINDINGS:      LUNGS: Lungs are adequately aerated.      HEART AND MEDIASTINUM: Heart and mediastinal contours are unremarkable        SKELETON: Bony and soft tissue structures are unremarkable.     Metallic artifact in the midline, presumably the patient's bra       Impression:      No radiographic evidence of acute cardiac or pulmonary disease.     This report was finalized on 5/9/2023 6:12 PM by Dr. Black Elaine MD.             Assessment & Plan      #NIDDM c/b DKA secondary to insulin deficiency  #Possible coexisting starvation ketosis  #AGMA  w/compensatory resp alkalosis  #Glucosuria  -Patient following locally with endocrinology, last A1c Jan 2023 8.3 w/repeat pending. Has dexcom with 29% in range readings, 69% either high or very high and <1% very low. Repeat here mildly increased to 8.80  -Unfortunately patient's presentation of DKA likely induced by recent changes associated with insurance coverage of basal insulin resulting in gap of coverage  -s/p 2L IVF bolus in ED, 32u self administered insulin at home immediately before admit  -FSBG >400mg/dl but patient noted on SGLT2 and significant glucosuria  -Pharmacy consulted for insulin pricing to be provided prior to discharge  -Already has close outpatient endo f/u  -DM educator consulted  -Hold SGLT2 and ozempic  -Gap closed overnight and tolerating PO intake w/o issues. 50u lantus to be given this am, had been on 75u qhs for multiple months. Glucommander rec lower dose based on insulin gtt rate however not taking into account patient's 32u aspart given immediatly before admission  -FSBG qac/qhs w/SSI ordered and hypoglycemia protocol in place  -Repeat bmp at 6pm     #SIRS response secondary to above (, WBC count elevated >11K)  -Secondary to DKA w/wbc and tachycardia resolved s/p IVF boluses     #MARIBEL secondary to dehydration  -Baseline wnl  -Cr improved after IVF    VTE Prophylaxis:   Mechanical Order History:     None      Pharmalogical Order History:      Ordered     Dose Route Frequency Stop    05/10/23 0849  Enoxaparin Sodium (LOVENOX) syringe 40 mg         40 mg SC Every 24 Hours --    05/09/23 1913  heparin (porcine) 5000 UNIT/ML injection 5,000 Units  Status:  Discontinued         5,000 Units SC Every 8 Hours Scheduled 05/10/23 0849                Code Status and Medical Interventions:   Ordered at: 05/09/23 1804     Code Status (Patient has no pulse and is not breathing):    CPR (Attempt to Resuscitate)     Medical Interventions (Patient has pulse or is breathing):    Full Support          Disposition: Cont pcu monitoring off insulin gtt, anticipate discharge <48hrs once fsbg stable    I have reviewed any copied/forwarded text or data, verified its accuracy, and updated as necessary above.    Juwan Seo MD  [facility]  05/10/23  14:24 EDT

## 2023-05-10 NOTE — PHARMACY PATIENT ASSISTANCE
Pharmacy was consulted to find the copay for several insulins. Pt was having difficulty obtaining her insulin due to possible insurance changes. When we checked according to her insurance levimir pen will have a $0.00 copay and lantus pen requires a PA. Fast acting insulins, Humalog and Novolog pens will both have a $0.00 copay. Admelog pen will need a PA.    Thank you  Guillermo Reyez, Pharmacy Intern  05/10/23  10:01 EDT

## 2023-05-10 NOTE — PLAN OF CARE
Goal Outcome Evaluation:  Plan of Care Reviewed With: patient        Progress: improving  Outcome Evaluation: Pt remains A+Ox4. VSS, afebrile, on RA. Insulin gtt currently infusing. D5 1/2 NS with 20 K+ infusing at 150mL/hr. Magnesium currently being replaced due to it being 1.9. Bed in lowest position, alarm off. Pt educated on the risks of having bed alarm off, pt states she's fine and would call out when she needed up. Call light in reach. Pt denies any requests at this time.

## 2023-05-10 NOTE — CONSULTS
"Diabetes Education  Assessment/Teaching    Patient Name:  Marquita Vernon  YOB: 1975  MRN: [id number]  Admit Date:  5/9/2023      Assessment Date:  5/10/2023  Flowsheet Row Most Recent Value   General Information     Height 167.6 cm (66\")   Height Method Stated   Weight 110 kg (243 lb 2.7 oz)   Weight Method Bed scale   Pregnancy Assessment    Diabetes History    Education Preferences    Nutrition Information    Assessment Topics    DM Goals           Flowsheet Row Most Recent Value   DM Education Needs    Meter Has own   Meter Type Other (comment)  [Dexcom]   Frequency of Testing Other (comment)  [CGM]   Medication Insulin   Problem Solving Hypoglycemia, Hyperglycemia, Sick days, Signs, Symptoms, Treatment   Reducing Risks Immunizations, Foot care, Dental exam, Eye exam, Blood pressure, Lipids, A1C testing, Cardiovascular, Neuropathy, Retinopathy   Healthy Eating Basic meal plan provided   Physical Activity Frequency Regularly   Healthy Coping Appropriate   Discharge Plan Home, Follow-up with PCP   Motivation Moderate   Teaching Method Explanation, Discussion, Handouts   Patient Response Verbalized understanding            Other Comments:  A1C 8.8 Patient was educated and received AADE7 and ADA handouts on diet, activity, checking blood glucose, taking medication as prescribed, checking feet daily and S/S of hypo/hyperglycemia. Patient was educated on sick rule days. Patient had no questions or concerns. Please re-consult or call for concerns or questions. Thank you.        Electronically signed by:  Rosita Farmer RN  05/10/23 14:47 EDT  "

## 2023-05-11 VITALS
OXYGEN SATURATION: 99 % | RESPIRATION RATE: 18 BRPM | SYSTOLIC BLOOD PRESSURE: 119 MMHG | TEMPERATURE: 98 F | HEART RATE: 87 BPM | HEIGHT: 66 IN | DIASTOLIC BLOOD PRESSURE: 79 MMHG | BODY MASS INDEX: 39.29 KG/M2 | WEIGHT: 244.5 LBS

## 2023-05-11 LAB
ANION GAP SERPL CALCULATED.3IONS-SCNC: 11.6 MMOL/L (ref 5–15)
BUN SERPL-MCNC: 12 MG/DL (ref 6–20)
BUN/CREAT SERPL: 11.8 (ref 7–25)
CALCIUM SPEC-SCNC: 8.5 MG/DL (ref 8.6–10.5)
CHLORIDE SERPL-SCNC: 111 MMOL/L (ref 98–107)
CO2 SERPL-SCNC: 18.4 MMOL/L (ref 22–29)
CREAT SERPL-MCNC: 1.02 MG/DL (ref 0.57–1)
EGFRCR SERPLBLD CKD-EPI 2021: 68 ML/MIN/1.73
GLUCOSE BLDC GLUCOMTR-MCNC: 268 MG/DL (ref 70–130)
GLUCOSE SERPL-MCNC: 138 MG/DL (ref 65–99)
MAGNESIUM SERPL-MCNC: 2.1 MG/DL (ref 1.6–2.6)
PHOSPHATE SERPL-MCNC: 2.7 MG/DL (ref 2.5–4.5)
PHOSPHATE SERPL-MCNC: 3.3 MG/DL (ref 2.5–4.5)
PHOSPHATE SERPL-MCNC: 3.5 MG/DL (ref 2.5–4.5)
POTASSIUM SERPL-SCNC: 3.7 MMOL/L (ref 3.5–5.2)
SODIUM SERPL-SCNC: 141 MMOL/L (ref 136–145)

## 2023-05-11 PROCEDURE — 84100 ASSAY OF PHOSPHORUS: CPT | Performed by: HOSPITALIST

## 2023-05-11 PROCEDURE — 94799 UNLISTED PULMONARY SVC/PX: CPT

## 2023-05-11 PROCEDURE — 99239 HOSP IP/OBS DSCHRG MGMT >30: CPT | Performed by: STUDENT IN AN ORGANIZED HEALTH CARE EDUCATION/TRAINING PROGRAM

## 2023-05-11 PROCEDURE — 83735 ASSAY OF MAGNESIUM: CPT | Performed by: STUDENT IN AN ORGANIZED HEALTH CARE EDUCATION/TRAINING PROGRAM

## 2023-05-11 PROCEDURE — 82948 REAGENT STRIP/BLOOD GLUCOSE: CPT

## 2023-05-11 PROCEDURE — 63710000001 INSULIN GLARGINE PER 5 UNITS: Performed by: STUDENT IN AN ORGANIZED HEALTH CARE EDUCATION/TRAINING PROGRAM

## 2023-05-11 PROCEDURE — G0378 HOSPITAL OBSERVATION PER HR: HCPCS

## 2023-05-11 PROCEDURE — 84100 ASSAY OF PHOSPHORUS: CPT | Performed by: STUDENT IN AN ORGANIZED HEALTH CARE EDUCATION/TRAINING PROGRAM

## 2023-05-11 PROCEDURE — 63710000001 INSULIN LISPRO (HUMAN) PER 5 UNITS: Performed by: STUDENT IN AN ORGANIZED HEALTH CARE EDUCATION/TRAINING PROGRAM

## 2023-05-11 PROCEDURE — 80048 BASIC METABOLIC PNL TOTAL CA: CPT | Performed by: STUDENT IN AN ORGANIZED HEALTH CARE EDUCATION/TRAINING PROGRAM

## 2023-05-11 PROCEDURE — 94761 N-INVAS EAR/PLS OXIMETRY MLT: CPT

## 2023-05-11 RX ORDER — INSULIN DETEMIR 100 [IU]/ML
75 INJECTION, SOLUTION SUBCUTANEOUS NIGHTLY
Qty: 30 ML | Refills: 0 | Status: SHIPPED | OUTPATIENT
Start: 2023-05-11 | End: 2023-06-20

## 2023-05-11 RX ORDER — INSULIN ASPART 100 [IU]/ML
22-40 INJECTION, SOLUTION INTRAVENOUS; SUBCUTANEOUS
Qty: 30 ML | Refills: 0 | Status: SHIPPED | OUTPATIENT
Start: 2023-05-11 | End: 2023-06-10

## 2023-05-11 RX ORDER — PEN NEEDLE, DIABETIC 31 GX5/16"
1 NEEDLE, DISPOSABLE MISCELLANEOUS 4 TIMES DAILY
Qty: 100 EACH | Refills: 1 | Status: SHIPPED | OUTPATIENT
Start: 2023-05-11

## 2023-05-11 RX ADMIN — Medication 1 TABLET: at 08:20

## 2023-05-11 RX ADMIN — CETIRIZINE HYDROCHLORIDE 10 MG: 10 TABLET, FILM COATED ORAL at 08:20

## 2023-05-11 RX ADMIN — POTASSIUM & SODIUM PHOSPHATES POWDER PACK 280-160-250 MG 2 PACKET: 280-160-250 PACK at 00:29

## 2023-05-11 RX ADMIN — INSULIN LISPRO 12 UNITS: 100 INJECTION, SOLUTION INTRAVENOUS; SUBCUTANEOUS at 08:18

## 2023-05-11 RX ADMIN — Medication 10 ML: at 08:21

## 2023-05-11 RX ADMIN — INSULIN GLARGINE 50 UNITS: 100 INJECTION, SOLUTION SUBCUTANEOUS at 08:18

## 2023-05-11 RX ADMIN — PANTOPRAZOLE SODIUM 40 MG: 40 TABLET, DELAYED RELEASE ORAL at 06:07

## 2023-05-11 RX ADMIN — PAROXETINE 10 MG: 10 TABLET, FILM COATED ORAL at 06:07

## 2023-05-11 RX ADMIN — Medication 1 CAPSULE: at 08:20

## 2023-05-11 NOTE — DISCHARGE INSTR - LAB
Follow up appointments;     Follow up with PCP on May 22, @ 8:30 AM     Follow up with Endocrinology on June 13, 9:00 AM

## 2023-05-11 NOTE — PLAN OF CARE
Goal Outcome Evaluation:  Plan of Care Reviewed With: patient        Progress: improving  Outcome Evaluation: Patient A&Ox4. VSS at this time. Patient remains on room air and is tolerating well. Blood glucose has remained stable thus far in shift. Patient reported back pain, PRN pain medication administered and patient verabalized relief. Phosphorus replaced per protocol. Patient has been sleeping between care and denies any further requests or needs at this time. Bed alarm refused but patient demonstrates understanding of safety/fall prevention and call light use, call light within reach. Will continue to follow plan of care 7p to 7a.

## 2023-05-15 NOTE — DISCHARGE SUMMARY
[facility]    Patient Identification:  Name:  Marquita Vernon  Age:  48 y.o.  Sex:  female  :  1975  MRN:  [id number]  Visit Number:  [id number]    Date of Admission: 2023  Date of Discharge: 2023    PCP: Lan Guerra, APRN    DISCHARGE DIAGNOSES  #NIDDM c/b DKA secondary to insulin deficiency  #Possible coexisting starvation ketosis  #AGMA w/compensatory resp alkalosis  #Glucosuria  #SIRS response secondary to above (, WBC count elevated >11K)  #MARIBEL secondary to dehydration    CONSULTS   Consults     No orders found from 4/10/2023 to 5/10/2023.          PROCEDURES PERFORMED  None    HOSPITAL COURSE  Ms. Vernon is a 48 yoF with above noted PMH that presented to ED with fatigue, nausea, vomiting with hyperglycemia in 400s and associated lab changes consistent with DKA after unfortunately being off insulin for around one week after her insurance company stopped covering her lantus. She had maribel on arrival but no coma or other evidence of end organ damage and was treated with standard IV insulin and fluids. She had given herself 32u subQ short acting novalog immediately prior to arrival to ED and thus her anion gap and acidosis corrected rapidly after admission. She was transitioned off insulin gtt >24hrs prior to discharge and started on reduced dose insulin detemir with sliding scale prandial insulin without DKA occurrence on subQ insulin. At time of discharge we have provided her with one month supply of insulin detemir pen and needles per pharmacy price check should be $0 copay. Patient received DM education and has hoe dexcom monitor to track FSBG. She will follow up outpatient with endocrinology and her pcp. For further problem based details please see below:    #NIDDM c/b DKA secondary to insulin deficiency  #Possible coexisting starvation ketosis  #AGMA w/compensatory resp alkalosis  #Glucosuria  -Patient following locally with  endocrinology, last A1c Jan 2023 8.3 w/repeat pending. Has dexcom with 29% in range readings, 69% either high or very high and <1% very low. Repeat here mildly increased to 8.80  -Already has close outpatient endo f/u  -DM educator consulted  -OK to resume SGLT2 and ozempic at d/c, given significant glucosuria her initial DKA more severe than her serum glucose level would suggest  -Discharged with 75u nightly insulin detemir, pen needles, plan to cont prior sliding scale novalog prandial coverage     #SIRS response secondary to above (, WBC count elevated >11K)  -Secondary to DKA w/wbc and tachycardia resolved s/p IVF boluses     #MARIBEL secondary to dehydration  -Baseline wnl  -Cr improved after IVF          VITAL SIGNS:        on   ;        Body mass index is 39.46 kg/m².  Wt Readings from Last 3 Encounters:   05/11/23 111 kg (244 lb 8 oz)   01/24/23 111 kg (244 lb 3.2 oz)   09/06/22 116 kg (256 lb 3.2 oz)       PHYSICAL EXAM:  Physical Exam  Vitals and nursing note reviewed.   Constitutional:       General: She is not in acute distress.     Appearance: She is obese. She is not ill-appearing.   HENT:      Mouth/Throat:      Mouth: Mucous membranes are dry.      Pharynx: Oropharynx is clear.   Eyes:      General: No scleral icterus.     Extraocular Movements: Extraocular movements intact.   Cardiovascular:      Rate and Rhythm: Normal rate.      Pulses: Normal pulses.   Pulmonary:      Effort: Pulmonary effort is normal. No respiratory distress.   Abdominal:      General: There is no distension.      Tenderness: There is no abdominal tenderness.   Musculoskeletal:      Right lower leg: No edema.      Left lower leg: No edema.   Skin:     General: Skin is warm and dry.      Capillary Refill: Capillary refill takes less than 2 seconds.   Neurological:      General: No focal deficit present.      Mental Status: She is alert and oriented to person, place, and time.   Psychiatric:         Mood and Affect: Mood  normal.         Behavior: Behavior normal.          DISCHARGE DISPOSITION   Stable       Discharge Medications      New Medications      Instructions Start Date   B-D ULTRAFINE III SHORT PEN 31G X 8 MM misc  Generic drug: Insulin Pen Needle   Use to inject insulin 4 (Four) Times a Day.      Levemir FlexPen 100 UNIT/ML injection  Generic drug: insulin detemir   75 Units, Subcutaneous, Nightly      NovoLOG FlexPen 100 UNIT/ML solution pen-injector sc pen  Generic drug: insulin aspart  Replaces: Insulin Aspart 100 UNIT/ML injection   22-40 Units, Subcutaneous, 3 Times Daily Before Meals         Continue These Medications      Instructions Start Date   empagliflozin 25 MG tablet tablet  Commonly known as: Jardiance   25 mg, Oral, Daily      HYDROcodone-acetaminophen 5-325 MG per tablet  Commonly known as: NORCO   1 tablet, Oral, Every 8 Hours PRN, Only takes PRN      lactobacillus acidophilus capsule capsule   1 capsule, Oral, Daily      Loratadine 10 MG capsule   10 mg, Oral, Nightly      multivitamin tablet tablet   1 tablet, Oral, Daily      omeprazole 20 MG capsule  Commonly known as: priLOSEC   20 mg, Oral, 2 Times Daily      PARoxetine 10 MG tablet  Commonly known as: PAXIL   10 mg, Oral, Every Morning      Semaglutide (1 MG/DOSE) 2 MG/1.5ML solution pen-injector  Commonly known as: OZEMPIC   1 mg, Subcutaneous, Weekly      tiZANidine 4 MG tablet  Commonly known as: ZANAFLEX   4 mg, Oral, Every 6 Hours PRN         Stop These Medications    Insulin Aspart 100 UNIT/ML injection  Commonly known as: novoLOG  Replaced by: NovoLOG FlexPen 100 UNIT/ML solution pen-injector sc pen     Lantus SoloStar 100 UNIT/ML injection pen  Generic drug: Insulin Glargine              Additional Instructions for the Follow-ups that You Need to Schedule     Discharge Follow-up with PCP   As directed       Currently Documented PCP:    Lan Guerra APRN    PCP Phone Number:    [phone]     Follow Up Details: Within 2 weeks  for DKA, insulin refill         Discharge Follow-up with Specified Provider: ENdocrinology as previously scheduled; 1 Month   As directed      To: ENdocrinology as previously scheduled    Follow Up: 1 Month    Follow Up Details: f/u DM        Additional information on Labs and Follow-ups:    Follow up appointments;     Follow up with PCP on May 22, @ 8:30 AM     Follow up with Endocrinology on June 13, 9:00 AM         Follow-up Information     Lan Guerra APRN .    Specialty: Family Medicine  Why: Within 2 weeks for DKA, insulin refill  Contact information:  [address] [place] [id number]  [phone]                          TEST  RESULTS PENDING AT DISCHARGE      I will notify patient via phone-call should above lab work result with any significant abnormal findings     CODE STATUS  Code Status and Medical Interventions:   Ordered at: 05/09/23 1808     Code Status (Patient has no pulse and is not breathing):    CPR (Attempt to Resuscitate)     Medical Interventions (Patient has pulse or is breathing):    Full Support       The ASCVD Risk score (Tony DK, et al., 2019) failed to calculate for the following reasons:    Cannot find a previous HDL lab    Cannot find a previous total cholesterol lab       Juwan Seo MD  [facility]  05/15/23  14:12 EDT    Please note that this discharge summary required more than 30 minutes to complete.

## 2023-06-19 PROBLEM — E11.10 DIABETIC KETOACIDOSIS WITHOUT COMA ASSOCIATED WITH TYPE 2 DIABETES MELLITUS: Status: ACTIVE | Noted: 2023-06-19

## 2023-07-18 PROBLEM — E13.9: Status: ACTIVE | Noted: 2018-11-26

## 2023-07-24 RX ORDER — BLOOD-GLUCOSE METER
1 KIT MISCELLANEOUS 4 TIMES DAILY
Qty: 250 EACH | Refills: 2 | Status: SHIPPED | OUTPATIENT
Start: 2023-07-24

## 2023-07-24 RX ORDER — PROCHLORPERAZINE 25 MG/1
1 SUPPOSITORY RECTAL
Qty: 1 EACH | Refills: 3 | Status: SHIPPED | OUTPATIENT
Start: 2023-07-24

## 2023-07-28 RX ORDER — INSULIN ASPART 100 [IU]/ML
INJECTION, SOLUTION INTRAVENOUS; SUBCUTANEOUS
Qty: 30 ML | Refills: 2 | Status: SHIPPED | OUTPATIENT
Start: 2023-07-28

## 2023-07-28 RX ORDER — GLUCAGON 1 MG
1 KIT INJECTION AS NEEDED
Qty: 1 EACH | Refills: 4 | Status: SHIPPED | OUTPATIENT
Start: 2023-07-28

## 2023-08-13 ENCOUNTER — APPOINTMENT (OUTPATIENT)
Dept: GENERAL RADIOLOGY | Facility: HOSPITAL | Age: 48
End: 2023-08-13
Payer: MEDICAID

## 2023-08-13 ENCOUNTER — HOSPITAL ENCOUNTER (EMERGENCY)
Facility: HOSPITAL | Age: 48
Discharge: ANOTHER HOSPITAL | End: 2023-08-13
Attending: EMERGENCY MEDICINE | Admitting: EMERGENCY MEDICINE
Payer: MEDICAID

## 2023-08-13 VITALS
OXYGEN SATURATION: 100 % | SYSTOLIC BLOOD PRESSURE: 101 MMHG | RESPIRATION RATE: 22 BRPM | TEMPERATURE: 97.4 F | DIASTOLIC BLOOD PRESSURE: 65 MMHG | WEIGHT: 234 LBS | HEIGHT: 66 IN | HEART RATE: 109 BPM | BODY MASS INDEX: 37.61 KG/M2

## 2023-08-13 DIAGNOSIS — E11.10 DIABETIC KETOACIDOSIS WITHOUT COMA ASSOCIATED WITH TYPE 2 DIABETES MELLITUS: ICD-10-CM

## 2023-08-13 DIAGNOSIS — E11.00 HYPEROSMOLAR HYPERGLYCEMIC STATE (HHS): ICD-10-CM

## 2023-08-13 DIAGNOSIS — E10.10 DIABETIC KETOACIDOSIS WITHOUT COMA ASSOCIATED WITH TYPE 1 DIABETES MELLITUS: Primary | ICD-10-CM

## 2023-08-13 DIAGNOSIS — N17.9 ACUTE RENAL FAILURE, UNSPECIFIED ACUTE RENAL FAILURE TYPE: ICD-10-CM

## 2023-08-13 DIAGNOSIS — E87.5 HYPERKALEMIA: ICD-10-CM

## 2023-08-13 DIAGNOSIS — I95.9 HYPOTENSION, UNSPECIFIED HYPOTENSION TYPE: ICD-10-CM

## 2023-08-13 LAB
A-A DO2: 138.2 MMHG (ref 0–300)
A-A DO2: 138.3 MMHG (ref 0–300)
A-A DO2: 33.1 MMHG (ref 0–300)
A-A DO2: 41 MMHG (ref 0–300)
ACETONE BLD QL: ABNORMAL
ALBUMIN SERPL-MCNC: 3.2 G/DL (ref 3.5–5.2)
ALBUMIN/GLOB SERPL: 1.2 G/DL
ALP SERPL-CCNC: 141 U/L (ref 39–117)
ALT SERPL W P-5'-P-CCNC: 13 U/L (ref 1–33)
ANION GAP SERPL CALCULATED.3IONS-SCNC: 35.4 MMOL/L (ref 5–15)
ANION GAP SERPL CALCULATED.3IONS-SCNC: 35.8 MMOL/L (ref 5–15)
ANION GAP SERPL CALCULATED.3IONS-SCNC: 38.1 MMOL/L (ref 5–15)
ANION GAP SERPL CALCULATED.3IONS-SCNC: 39.3 MMOL/L (ref 5–15)
APTT PPP: 32.6 SECONDS (ref 26.5–34.5)
ARTERIAL PATENCY WRIST A: POSITIVE
AST SERPL-CCNC: 12 U/L (ref 1–32)
ATMOSPHERIC PRESS: 727 MMHG
ATMOSPHERIC PRESS: 728 MMHG
BASE EXCESS BLDA CALC-SCNC: -22.7 MMOL/L (ref 0–2)
BASE EXCESS BLDA CALC-SCNC: -24 MMOL/L (ref 0–2)
BASE EXCESS BLDA CALC-SCNC: -26.3 MMOL/L (ref 0–2)
BASE EXCESS BLDA CALC-SCNC: -30.4 MMOL/L (ref 0–2)
BDY SITE: ABNORMAL
BILIRUB SERPL-MCNC: 0.3 MG/DL (ref 0–1.2)
BILIRUB UR QL STRIP: NEGATIVE
BUN SERPL-MCNC: 61 MG/DL (ref 6–20)
BUN SERPL-MCNC: 63 MG/DL (ref 6–20)
BUN SERPL-MCNC: 65 MG/DL (ref 6–20)
BUN SERPL-MCNC: 66 MG/DL (ref 6–20)
BUN/CREAT SERPL: 18 (ref 7–25)
BUN/CREAT SERPL: 19.3 (ref 7–25)
BUN/CREAT SERPL: 20.8 (ref 7–25)
BUN/CREAT SERPL: 21.5 (ref 7–25)
CA-I SERPL ISE-MCNC: 1.06 MMOL/L (ref 1.12–1.32)
CALCIUM SPEC-SCNC: 7.1 MG/DL (ref 8.6–10.5)
CALCIUM SPEC-SCNC: 7.2 MG/DL (ref 8.6–10.5)
CALCIUM SPEC-SCNC: 7.8 MG/DL (ref 8.6–10.5)
CALCIUM SPEC-SCNC: 7.9 MG/DL (ref 8.6–10.5)
CHLORIDE SERPL-SCNC: 80 MMOL/L (ref 98–107)
CHLORIDE SERPL-SCNC: 87 MMOL/L (ref 98–107)
CHLORIDE SERPL-SCNC: 89 MMOL/L (ref 98–107)
CHLORIDE SERPL-SCNC: 90 MMOL/L (ref 98–107)
CLARITY UR: CLEAR
CO2 BLDA-SCNC: 3.9 MMOL/L (ref 22–33)
CO2 BLDA-SCNC: 5.5 MMOL/L (ref 22–33)
CO2 BLDA-SCNC: 5.7 MMOL/L (ref 22–33)
CO2 BLDA-SCNC: 7.1 MMOL/L (ref 22–33)
CO2 SERPL-SCNC: 2.9 MMOL/L (ref 22–29)
CO2 SERPL-SCNC: 3.6 MMOL/L (ref 22–29)
CO2 SERPL-SCNC: 5.2 MMOL/L (ref 22–29)
CO2 SERPL-SCNC: 5.7 MMOL/L (ref 22–29)
COHGB MFR BLD: 0.9 % (ref 0–5)
COHGB MFR BLD: 0.9 % (ref 0–5)
COHGB MFR BLD: 1 % (ref 0–5)
COHGB MFR BLD: 1 % (ref 0–5)
COLOR UR: YELLOW
CREAT SERPL-MCNC: 3.02 MG/DL (ref 0.57–1)
CREAT SERPL-MCNC: 3.18 MG/DL (ref 0.57–1)
CREAT SERPL-MCNC: 3.26 MG/DL (ref 0.57–1)
CREAT SERPL-MCNC: 3.39 MG/DL (ref 0.57–1)
CRP SERPL-MCNC: 4.57 MG/DL (ref 0–0.5)
D-LACTATE SERPL-SCNC: 3.5 MMOL/L (ref 0.5–2)
D-LACTATE SERPL-SCNC: 4.8 MMOL/L (ref 0.5–2)
DEPRECATED RDW RBC AUTO: 53.3 FL (ref 37–54)
EGFRCR SERPLBLD CKD-EPI 2021: 16.1 ML/MIN/1.73
EGFRCR SERPLBLD CKD-EPI 2021: 16.9 ML/MIN/1.73
EGFRCR SERPLBLD CKD-EPI 2021: 17.4 ML/MIN/1.73
EGFRCR SERPLBLD CKD-EPI 2021: 18.5 ML/MIN/1.73
EOSINOPHIL # BLD MANUAL: 0.39 10*3/MM3 (ref 0–0.4)
EOSINOPHIL NFR BLD MANUAL: 1 % (ref 0.3–6.2)
ERYTHROCYTE [DISTWIDTH] IN BLOOD BY AUTOMATED COUNT: 13.6 % (ref 12.3–15.4)
GAS FLOW AIRWAY: 4 LPM
GAS FLOW AIRWAY: 4 LPM
GEN 5 2HR TROPONIN T REFLEX: 41 NG/L
GLOBULIN UR ELPH-MCNC: 2.6 GM/DL
GLUCOSE SERPL-MCNC: 1013 MG/DL (ref 65–99)
GLUCOSE SERPL-MCNC: 1112 MG/DL (ref 65–99)
GLUCOSE SERPL-MCNC: 1274 MG/DL (ref 65–99)
GLUCOSE SERPL-MCNC: 1348 MG/DL (ref 65–99)
GLUCOSE UR STRIP-MCNC: ABNORMAL MG/DL
HBA1C MFR BLD: 7.7 % (ref 4.8–5.6)
HCO3 BLDA-SCNC: 3.3 MMOL/L (ref 20–26)
HCO3 BLDA-SCNC: 5 MMOL/L (ref 20–26)
HCO3 BLDA-SCNC: 5.2 MMOL/L (ref 20–26)
HCO3 BLDA-SCNC: 6.1 MMOL/L (ref 20–26)
HCT VFR BLD AUTO: 37.2 % (ref 34–46.6)
HCT VFR BLD CALC: 31.7 % (ref 38–51)
HCT VFR BLD CALC: 32.7 % (ref 38–51)
HCT VFR BLD CALC: 35 % (ref 38–51)
HCT VFR BLD CALC: 35.2 % (ref 38–51)
HGB BLD-MCNC: 10.4 G/DL (ref 12–15.9)
HGB BLDA-MCNC: 10.3 G/DL (ref 13.5–17.5)
HGB BLDA-MCNC: 10.7 G/DL (ref 13.5–17.5)
HGB BLDA-MCNC: 11.4 G/DL (ref 13.5–17.5)
HGB BLDA-MCNC: 11.5 G/DL (ref 13.5–17.5)
HGB UR QL STRIP.AUTO: NEGATIVE
HOLD SPECIMEN: NORMAL
HOLD SPECIMEN: NORMAL
HYPOCHROMIA BLD QL: ABNORMAL
INHALED O2 CONCENTRATION: 21 %
INHALED O2 CONCENTRATION: 21 %
INHALED O2 CONCENTRATION: 36 %
INHALED O2 CONCENTRATION: 36 %
INR PPP: 1.45 (ref 0.9–1.1)
KETONES UR QL STRIP: ABNORMAL
LEUKOCYTE ESTERASE UR QL STRIP.AUTO: NEGATIVE
LIPASE SERPL-CCNC: 23 U/L (ref 13–60)
LYMPHOCYTES # BLD MANUAL: 5.03 10*3/MM3 (ref 0.7–3.1)
LYMPHOCYTES NFR BLD MANUAL: 1 % (ref 5–12)
Lab: ABNORMAL
MACROCYTES BLD QL SMEAR: ABNORMAL
MAGNESIUM SERPL-MCNC: 2.7 MG/DL (ref 1.6–2.6)
MAGNESIUM SERPL-MCNC: 3.2 MG/DL (ref 1.6–2.6)
MCH RBC QN AUTO: 29.4 PG (ref 26.6–33)
MCHC RBC AUTO-ENTMCNC: 28 G/DL (ref 31.5–35.7)
MCV RBC AUTO: 105.1 FL (ref 79–97)
METAMYELOCYTES NFR BLD MANUAL: 4 % (ref 0–0)
METHGB BLD QL: 0.9 % (ref 0–3)
METHGB BLD QL: 1 % (ref 0–3)
MODALITY: ABNORMAL
MONOCYTES # BLD: 0.39 10*3/MM3 (ref 0.1–0.9)
MYELOCYTES NFR BLD MANUAL: 9 % (ref 0–0)
NEUTROPHILS # BLD AUTO: 27.89 10*3/MM3 (ref 1.7–7)
NEUTROPHILS NFR BLD MANUAL: 68 % (ref 42.7–76)
NEUTS BAND NFR BLD MANUAL: 4 % (ref 0–5)
NEUTS VAC BLD QL SMEAR: ABNORMAL
NITRITE UR QL STRIP: NEGATIVE
NOTE: ABNORMAL
NOTIFIED BY: ABNORMAL
NOTIFIED WHO: ABNORMAL
OXYHGB MFR BLDV: 83.9 % (ref 94–99)
OXYHGB MFR BLDV: 89.1 % (ref 94–99)
OXYHGB MFR BLDV: 93 % (ref 94–99)
OXYHGB MFR BLDV: 93.6 % (ref 94–99)
PCO2 BLDA: 16.3 MM HG (ref 35–45)
PCO2 BLDA: 19.5 MM HG (ref 35–45)
PCO2 BLDA: 22 MM HG (ref 35–45)
PCO2 BLDA: 33.7 MM HG (ref 35–45)
PCO2 TEMP ADJ BLD: ABNORMAL MM[HG]
PH BLDA: 6.87 PH UNITS (ref 7.35–7.45)
PH BLDA: 7.03 PH UNITS (ref 7.35–7.45)
PH BLDA: 7.1 PH UNITS (ref 7.35–7.45)
PH BLDA: <6.779 PH UNITS (ref 7.35–7.45)
PH UR STRIP.AUTO: <=5 [PH] (ref 5–8)
PH, TEMP CORRECTED: ABNORMAL
PHOSPHATE SERPL-MCNC: 13.1 MG/DL (ref 2.5–4.5)
PHOSPHATE SERPL-MCNC: 14.4 MG/DL (ref 2.5–4.5)
PLAT MORPH BLD: NORMAL
PLATELET # BLD AUTO: 428 10*3/MM3 (ref 140–450)
PMV BLD AUTO: 13.3 FL (ref 6–12)
PO2 BLDA: 64 MM HG (ref 83–108)
PO2 BLDA: 84.9 MM HG (ref 83–108)
PO2 BLDA: 85.1 MM HG (ref 83–108)
PO2 BLDA: 88.8 MM HG (ref 83–108)
PO2 TEMP ADJ BLD: ABNORMAL MM[HG]
POTASSIUM SERPL-SCNC: 5.2 MMOL/L (ref 3.5–5.2)
POTASSIUM SERPL-SCNC: 6.3 MMOL/L (ref 3.5–5.2)
POTASSIUM SERPL-SCNC: 7.1 MMOL/L (ref 3.5–5.2)
POTASSIUM SERPL-SCNC: 7.7 MMOL/L (ref 3.5–5.2)
PROT SERPL-MCNC: 5.8 G/DL (ref 6–8.5)
PROT UR QL STRIP: NEGATIVE
PROTHROMBIN TIME: 18.2 SECONDS (ref 12.1–14.7)
RBC # BLD AUTO: 3.54 10*6/MM3 (ref 3.77–5.28)
SAO2 % BLDCOA: 85.4 % (ref 94–99)
SAO2 % BLDCOA: 90.8 % (ref 94–99)
SAO2 % BLDCOA: 94.8 % (ref 94–99)
SAO2 % BLDCOA: 95.4 % (ref 94–99)
SCAN SLIDE: NORMAL
SODIUM SERPL-SCNC: 121 MMOL/L (ref 136–145)
SODIUM SERPL-SCNC: 126 MMOL/L (ref 136–145)
SODIUM SERPL-SCNC: 130 MMOL/L (ref 136–145)
SODIUM SERPL-SCNC: 135 MMOL/L (ref 136–145)
SP GR UR STRIP: 1.02 (ref 1–1.03)
TOXIC GRANULATION: ABNORMAL
TROPONIN T DELTA: 0 NG/L
TROPONIN T SERPL HS-MCNC: 41 NG/L
UROBILINOGEN UR QL STRIP: ABNORMAL
VARIANT LYMPHS NFR BLD MANUAL: 13 % (ref 19.6–45.3)
VENTILATOR MODE: ABNORMAL
WBC NRBC COR # BLD: 38.73 10*3/MM3 (ref 3.4–10.8)
WHOLE BLOOD HOLD COAG: NORMAL
WHOLE BLOOD HOLD SPECIMEN: NORMAL

## 2023-08-13 PROCEDURE — 96368 THER/DIAG CONCURRENT INF: CPT

## 2023-08-13 PROCEDURE — 85025 COMPLETE CBC W/AUTO DIFF WBC: CPT | Performed by: EMERGENCY MEDICINE

## 2023-08-13 PROCEDURE — 83930 ASSAY OF BLOOD OSMOLALITY: CPT | Performed by: EMERGENCY MEDICINE

## 2023-08-13 PROCEDURE — 83690 ASSAY OF LIPASE: CPT | Performed by: EMERGENCY MEDICINE

## 2023-08-13 PROCEDURE — 94640 AIRWAY INHALATION TREATMENT: CPT

## 2023-08-13 PROCEDURE — 25010000002 PIPERACILLIN SOD-TAZOBACTAM PER 1 G: Performed by: EMERGENCY MEDICINE

## 2023-08-13 PROCEDURE — 83050 HGB METHEMOGLOBIN QUAN: CPT

## 2023-08-13 PROCEDURE — 25010000002 CALCIUM GLUCONATE-NACL 1-0.675 GM/50ML-% SOLUTION: Performed by: EMERGENCY MEDICINE

## 2023-08-13 PROCEDURE — 36600 WITHDRAWAL OF ARTERIAL BLOOD: CPT

## 2023-08-13 PROCEDURE — 82375 ASSAY CARBOXYHB QUANT: CPT

## 2023-08-13 PROCEDURE — 82330 ASSAY OF CALCIUM: CPT | Performed by: EMERGENCY MEDICINE

## 2023-08-13 PROCEDURE — 96376 TX/PRO/DX INJ SAME DRUG ADON: CPT

## 2023-08-13 PROCEDURE — 93005 ELECTROCARDIOGRAM TRACING: CPT | Performed by: EMERGENCY MEDICINE

## 2023-08-13 PROCEDURE — 82948 REAGENT STRIP/BLOOD GLUCOSE: CPT

## 2023-08-13 PROCEDURE — 85730 THROMBOPLASTIN TIME PARTIAL: CPT | Performed by: EMERGENCY MEDICINE

## 2023-08-13 PROCEDURE — 87147 CULTURE TYPE IMMUNOLOGIC: CPT | Performed by: EMERGENCY MEDICINE

## 2023-08-13 PROCEDURE — 82009 KETONE BODYS QUAL: CPT | Performed by: EMERGENCY MEDICINE

## 2023-08-13 PROCEDURE — 96365 THER/PROPH/DIAG IV INF INIT: CPT

## 2023-08-13 PROCEDURE — 83735 ASSAY OF MAGNESIUM: CPT | Performed by: EMERGENCY MEDICINE

## 2023-08-13 PROCEDURE — 84484 ASSAY OF TROPONIN QUANT: CPT | Performed by: EMERGENCY MEDICINE

## 2023-08-13 PROCEDURE — 36415 COLL VENOUS BLD VENIPUNCTURE: CPT

## 2023-08-13 PROCEDURE — 87040 BLOOD CULTURE FOR BACTERIA: CPT | Performed by: EMERGENCY MEDICINE

## 2023-08-13 PROCEDURE — C1751 CATH, INF, PER/CENT/MIDLINE: HCPCS

## 2023-08-13 PROCEDURE — 51702 INSERT TEMP BLADDER CATH: CPT

## 2023-08-13 PROCEDURE — 82805 BLOOD GASES W/O2 SATURATION: CPT

## 2023-08-13 PROCEDURE — 71045 X-RAY EXAM CHEST 1 VIEW: CPT | Performed by: RADIOLOGY

## 2023-08-13 PROCEDURE — 93010 ELECTROCARDIOGRAM REPORT: CPT | Performed by: INTERNAL MEDICINE

## 2023-08-13 PROCEDURE — 94799 UNLISTED PULMONARY SVC/PX: CPT

## 2023-08-13 PROCEDURE — 81003 URINALYSIS AUTO W/O SCOPE: CPT | Performed by: EMERGENCY MEDICINE

## 2023-08-13 PROCEDURE — 87150 DNA/RNA AMPLIFIED PROBE: CPT | Performed by: EMERGENCY MEDICINE

## 2023-08-13 PROCEDURE — 96366 THER/PROPH/DIAG IV INF ADDON: CPT

## 2023-08-13 PROCEDURE — 85610 PROTHROMBIN TIME: CPT | Performed by: EMERGENCY MEDICINE

## 2023-08-13 PROCEDURE — 71045 X-RAY EXAM CHEST 1 VIEW: CPT

## 2023-08-13 PROCEDURE — 84100 ASSAY OF PHOSPHORUS: CPT | Performed by: EMERGENCY MEDICINE

## 2023-08-13 PROCEDURE — 99291 CRITICAL CARE FIRST HOUR: CPT

## 2023-08-13 PROCEDURE — 83036 HEMOGLOBIN GLYCOSYLATED A1C: CPT | Performed by: EMERGENCY MEDICINE

## 2023-08-13 PROCEDURE — 85007 BL SMEAR W/DIFF WBC COUNT: CPT | Performed by: EMERGENCY MEDICINE

## 2023-08-13 PROCEDURE — 86140 C-REACTIVE PROTEIN: CPT | Performed by: EMERGENCY MEDICINE

## 2023-08-13 PROCEDURE — 80053 COMPREHEN METABOLIC PANEL: CPT | Performed by: EMERGENCY MEDICINE

## 2023-08-13 PROCEDURE — 83605 ASSAY OF LACTIC ACID: CPT | Performed by: EMERGENCY MEDICINE

## 2023-08-13 RX ORDER — ALBUTEROL SULFATE 2.5 MG/3ML
5 SOLUTION RESPIRATORY (INHALATION) ONCE
Status: COMPLETED | OUTPATIENT
Start: 2023-08-13 | End: 2023-08-13

## 2023-08-13 RX ORDER — CALCIUM GLUCONATE 20 MG/ML
1000 INJECTION, SOLUTION INTRAVENOUS ONCE
Status: COMPLETED | OUTPATIENT
Start: 2023-08-13 | End: 2023-08-13

## 2023-08-13 RX ORDER — SODIUM CHLORIDE AND POTASSIUM CHLORIDE 300; 900 MG/100ML; MG/100ML
250 INJECTION, SOLUTION INTRAVENOUS CONTINUOUS PRN
Status: DISCONTINUED | OUTPATIENT
Start: 2023-08-13 | End: 2023-08-13 | Stop reason: HOSPADM

## 2023-08-13 RX ORDER — DEXTROSE MONOHYDRATE 25 G/50ML
10-50 INJECTION, SOLUTION INTRAVENOUS
Status: DISCONTINUED | OUTPATIENT
Start: 2023-08-13 | End: 2023-08-13 | Stop reason: HOSPADM

## 2023-08-13 RX ORDER — GLUCAGON 1 MG/ML
1 KIT INJECTION
Status: DISCONTINUED | OUTPATIENT
Start: 2023-08-13 | End: 2023-08-13 | Stop reason: HOSPADM

## 2023-08-13 RX ORDER — NOREPINEPHRINE BITARTRATE 0.03 MG/ML
.02-.3 INJECTION, SOLUTION INTRAVENOUS
Status: DISCONTINUED | OUTPATIENT
Start: 2023-08-13 | End: 2023-08-13 | Stop reason: HOSPADM

## 2023-08-13 RX ORDER — DEXTROSE MONOHYDRATE, SODIUM CHLORIDE, AND POTASSIUM CHLORIDE 50; 2.98; 4.5 G/1000ML; G/1000ML; G/1000ML
150 INJECTION, SOLUTION INTRAVENOUS CONTINUOUS PRN
Status: DISCONTINUED | OUTPATIENT
Start: 2023-08-13 | End: 2023-08-13 | Stop reason: HOSPADM

## 2023-08-13 RX ORDER — SODIUM CHLORIDE 0.9 % (FLUSH) 0.9 %
10 SYRINGE (ML) INJECTION AS NEEDED
Status: DISCONTINUED | OUTPATIENT
Start: 2023-08-13 | End: 2023-08-13 | Stop reason: HOSPADM

## 2023-08-13 RX ORDER — SODIUM CHLORIDE AND POTASSIUM CHLORIDE 150; 450 MG/100ML; MG/100ML
250 INJECTION, SOLUTION INTRAVENOUS CONTINUOUS PRN
Status: DISCONTINUED | OUTPATIENT
Start: 2023-08-13 | End: 2023-08-13 | Stop reason: HOSPADM

## 2023-08-13 RX ORDER — SODIUM CHLORIDE 0.9 % (FLUSH) 0.9 %
10 SYRINGE (ML) INJECTION EVERY 12 HOURS SCHEDULED
Status: DISCONTINUED | OUTPATIENT
Start: 2023-08-13 | End: 2023-08-13 | Stop reason: HOSPADM

## 2023-08-13 RX ORDER — NOREPINEPHRINE BITARTRATE 0.03 MG/ML
INJECTION, SOLUTION INTRAVENOUS
Status: COMPLETED
Start: 2023-08-13 | End: 2023-08-13

## 2023-08-13 RX ORDER — DEXTROSE MONOHYDRATE, SODIUM CHLORIDE, AND POTASSIUM CHLORIDE 50; 1.49; 9 G/1000ML; G/1000ML; G/1000ML
150 INJECTION, SOLUTION INTRAVENOUS CONTINUOUS PRN
Status: DISCONTINUED | OUTPATIENT
Start: 2023-08-13 | End: 2023-08-13 | Stop reason: HOSPADM

## 2023-08-13 RX ORDER — DEXTROSE MONOHYDRATE, SODIUM CHLORIDE, AND POTASSIUM CHLORIDE 50; 2.98; 9 G/1000ML; G/1000ML; G/1000ML
150 INJECTION, SOLUTION INTRAVENOUS CONTINUOUS PRN
Status: DISCONTINUED | OUTPATIENT
Start: 2023-08-13 | End: 2023-08-13 | Stop reason: HOSPADM

## 2023-08-13 RX ORDER — SODIUM CHLORIDE 450 MG/100ML
250 INJECTION, SOLUTION INTRAVENOUS CONTINUOUS PRN
Status: DISCONTINUED | OUTPATIENT
Start: 2023-08-13 | End: 2023-08-13 | Stop reason: HOSPADM

## 2023-08-13 RX ORDER — NICOTINE POLACRILEX 4 MG
15 LOZENGE BUCCAL
Status: DISCONTINUED | OUTPATIENT
Start: 2023-08-13 | End: 2023-08-13 | Stop reason: HOSPADM

## 2023-08-13 RX ORDER — SODIUM POLYSTYRENE SULFONATE 4.1 MEQ/G
30 POWDER, FOR SUSPENSION ORAL; RECTAL ONCE
Status: COMPLETED | OUTPATIENT
Start: 2023-08-13 | End: 2023-08-13

## 2023-08-13 RX ORDER — SODIUM CHLORIDE 9 MG/ML
40 INJECTION, SOLUTION INTRAVENOUS AS NEEDED
Status: DISCONTINUED | OUTPATIENT
Start: 2023-08-13 | End: 2023-08-13 | Stop reason: HOSPADM

## 2023-08-13 RX ORDER — SODIUM POLYSTYRENE SULFONATE 4.1 MEQ/G
30 POWDER, FOR SUSPENSION ORAL; RECTAL ONCE
Status: DISCONTINUED | OUTPATIENT
Start: 2023-08-13 | End: 2023-08-13

## 2023-08-13 RX ORDER — DEXTROSE AND SODIUM CHLORIDE 5; .45 G/100ML; G/100ML
150 INJECTION, SOLUTION INTRAVENOUS CONTINUOUS PRN
Status: DISCONTINUED | OUTPATIENT
Start: 2023-08-13 | End: 2023-08-13 | Stop reason: HOSPADM

## 2023-08-13 RX ORDER — LACTULOSE 10 G/15ML
20 SOLUTION ORAL ONCE
Status: DISCONTINUED | OUTPATIENT
Start: 2023-08-13 | End: 2023-08-13

## 2023-08-13 RX ORDER — DEXTROSE MONOHYDRATE, SODIUM CHLORIDE, AND POTASSIUM CHLORIDE 50; 1.49; 4.5 G/1000ML; G/1000ML; G/1000ML
150 INJECTION, SOLUTION INTRAVENOUS CONTINUOUS PRN
Status: DISCONTINUED | OUTPATIENT
Start: 2023-08-13 | End: 2023-08-13 | Stop reason: HOSPADM

## 2023-08-13 RX ORDER — LACTULOSE 10 G/15ML
300 SOLUTION ORAL ONCE
Status: COMPLETED | OUTPATIENT
Start: 2023-08-13 | End: 2023-08-13

## 2023-08-13 RX ORDER — SODIUM CHLORIDE 9 MG/ML
250 INJECTION, SOLUTION INTRAVENOUS CONTINUOUS PRN
Status: DISCONTINUED | OUTPATIENT
Start: 2023-08-13 | End: 2023-08-13 | Stop reason: HOSPADM

## 2023-08-13 RX ORDER — DEXTROSE AND SODIUM CHLORIDE 5; .9 G/100ML; G/100ML
150 INJECTION, SOLUTION INTRAVENOUS CONTINUOUS PRN
Status: DISCONTINUED | OUTPATIENT
Start: 2023-08-13 | End: 2023-08-13 | Stop reason: HOSPADM

## 2023-08-13 RX ORDER — SODIUM CHLORIDE AND POTASSIUM CHLORIDE 150; 900 MG/100ML; MG/100ML
250 INJECTION, SOLUTION INTRAVENOUS CONTINUOUS PRN
Status: DISCONTINUED | OUTPATIENT
Start: 2023-08-13 | End: 2023-08-13 | Stop reason: HOSPADM

## 2023-08-13 RX ADMIN — CALCIUM GLUCONATE 1000 MG: 20 INJECTION, SOLUTION INTRAVENOUS at 16:19

## 2023-08-13 RX ADMIN — INSULIN HUMAN 5.4 UNITS/HR: 1 INJECTION, SOLUTION INTRAVENOUS at 15:28

## 2023-08-13 RX ADMIN — SODIUM BICARBONATE: 84 INJECTION INTRAVENOUS at 16:55

## 2023-08-13 RX ADMIN — SODIUM BICARBONATE 100 MEQ: 84 INJECTION INTRAVENOUS at 16:26

## 2023-08-13 RX ADMIN — ALBUTEROL SULFATE 5 MG: 0.83 SOLUTION RESPIRATORY (INHALATION) at 17:50

## 2023-08-13 RX ADMIN — Medication 50 MEQ: at 16:48

## 2023-08-13 RX ADMIN — PIPERACILLIN SODIUM AND TAZOBACTAM SODIUM 3.38 G: 3; .375 INJECTION, POWDER, LYOPHILIZED, FOR SOLUTION INTRAVENOUS at 15:51

## 2023-08-13 RX ADMIN — LACTULOSE 300 ML: 10 SOLUTION ORAL at 19:33

## 2023-08-13 RX ADMIN — SODIUM POLYSTYRENE SULFONATE 30 G: 1 POWDER ORAL; RECTAL at 19:19

## 2023-08-13 RX ADMIN — SODIUM CHLORIDE 250 ML/HR: 4.5 INJECTION, SOLUTION INTRAVENOUS at 17:55

## 2023-08-13 RX ADMIN — SODIUM CHLORIDE 3180 ML: 9 INJECTION, SOLUTION INTRAVENOUS at 15:33

## 2023-08-13 RX ADMIN — Medication 50 MEQ: at 16:19

## 2023-08-13 RX ADMIN — SODIUM BICARBONATE 50 MEQ: 84 INJECTION INTRAVENOUS at 16:09

## 2023-08-13 RX ADMIN — CALCIUM GLUCONATE 1000 MG: 20 INJECTION, SOLUTION INTRAVENOUS at 18:35

## 2023-08-13 RX ADMIN — SODIUM BICARBONATE 50 MEQ: 84 INJECTION INTRAVENOUS at 16:19

## 2023-08-13 RX ADMIN — Medication 0.1 MCG/KG/MIN: at 16:47

## 2023-08-13 RX ADMIN — SODIUM CHLORIDE 1000 ML: 9 INJECTION, SOLUTION INTRAVENOUS at 16:20

## 2023-08-13 RX ADMIN — NOREPINEPHRINE BITARTRATE 0.1 MCG/KG/MIN: 0.03 INJECTION, SOLUTION INTRAVENOUS at 16:47

## 2023-08-13 RX ADMIN — SODIUM BICARBONATE 50 MEQ: 84 INJECTION INTRAVENOUS at 16:48

## 2023-08-13 NOTE — ED NOTES
Called [facility] for dr rinaldi to talk to dr honeycutt . Dr honeycutt was in the middle of scrubbing in for a stemi

## 2023-08-13 NOTE — ED NOTES
Called pharmacy to get .45 saline at this time. Pharmacy states they are trying to find some and will call me.

## 2023-08-13 NOTE — ED NOTES
Pt noted to have Omnipod secured to abd, removed at this time, family at bedside, omnipod placed in trash.
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
## 2023-08-13 NOTE — ED PROVIDER NOTES
"Subjective   History of Present Illness  48-year-old diabetic patient found by daughter unconscious brought in by ambulance.  No more history available at this time patient is obtunded    Later when family was interviewed 8 was reported that patient last seen 9:30 PM yesterday evening when the son talked to her.  The son states that she kept calling him this morning but she did not answer.  According to the son patient has severe diabetes and she is insulin-dependent and has been admitted few times for DKA.  They apparently found her between 1230 and 1 PM today and called EMS.    She is allergic to penicillin and Lasix    The son states that she does not have any history of heart disease cancer or kidney problems or liver problems    Review of Systems    Past Medical History:   Diagnosis Date    Abnormal ECG     Arthritis     Asthma     Diabetes mellitus     GERD (gastroesophageal reflux disease)     IBS (irritable bowel syndrome)     Pancreatitis 2017    Scoliosis        Allergies   Allergen Reactions    Ibuprofen Hives and Other (See Comments)     \"smiley\"    Latex Hives    Vaseline [Petrolatum] Hives       Past Surgical History:   Procedure Laterality Date    CARDIOVASCULAR STRESS TEST  11/27/2018    L. Cardiolite- Unable to walk. EF 69%. Negative.    CHOLECYSTECTOMY      COLONOSCOPY      COLONOSCOPY N/A 9/21/2018    Procedure: COLONOSCOPY CPT CODE: 39099;  Surgeon: Micah Swartz MD;  Location: [facility] OR;  Service: Gastroenterology    CYST REMOVAL      ECHO - CONVERTED  11/27/2018    EF 65%. No AS. Mild MR    ENDOSCOPY      ENDOSCOPY N/A 9/21/2018    Procedure: ESOPHAGOGASTRODUODENOSCOPY WITH BIOPSY CPT CODE: 58247;  Surgeon: Micah Swartz MD;  Location: [facility] OR;  Service: Gastroenterology    OTHER SURGICAL HISTORY  01/02/2019    Event monitor- baseline sinus, one 39 beat SVT, no V-tach, no AFib, no pauses    TUBAL ABDOMINAL LIGATION      UPPER GASTROINTESTINAL ENDOSCOPY  2015 2018 " "      Family History   Problem Relation Age of Onset    Diabetes Other     Cancer Other     Lung cancer Other     Heart attack Father         MI in his late 40's    No Known Problems Sister        Social History     Socioeconomic History    Marital status: Single   Tobacco Use    Smoking status: Never    Smokeless tobacco: Never   Vaping Use    Vaping Use: Never used   Substance and Sexual Activity    Alcohol use: No    Drug use: No    Sexual activity: Defer           Objective   Physical Exam  Vitals and nursing note reviewed.   Constitutional:       Comments: Patient is confused and lethargic, but keeps saying \"help\" while IV line is being attempted.    Head is normocephalic atraumatic, pupils are 2 mm and equal and nonreactive to light.  Extraocular muscles appear intact, sclera is anicteric conjunctiva are clear    Lungs are decreased breath sounds bilaterally but clear to auscultation otherwise without wheezes    Cardiac exam is regular rate rhythm tachycardic 90s    Abdomen is obese but without any guarding or rebound    Extremities are without clubbing cyanosis or edema    Neurologic exam patient is lethargic and confused but answers to  simple questions with yes or no states \"sorry\" when told not to move while inserting femoral central line.       Central Line At Bedside    Date/Time: 8/13/2023 8:17 PM  Performed by: Edward Ziegler MD  Authorized by: Edward Ziegler MD     Consent:     Consent obtained:  Emergent situation  Pre-procedure details:     Indication(s): insufficient peripheral access      Hand hygiene: Hand hygiene performed prior to insertion      Sterile barrier technique: All elements of maximal sterile technique followed      Skin preparation:  Chlorhexidine    Skin preparation agent: Skin preparation agent completely dried prior to procedure    Sedation:     Sedation type:  None  Anesthesia:     Anesthesia method:  Local infiltration    Local anesthetic:  Lidocaine 1% WITH epi  Procedure " details:     Location:  L internal jugular    Patient position:  Supine    Procedural supplies:  Triple lumen    Catheter size:  7 Fr    Landmarks identified: yes      Ultrasound guidance: yes      Ultrasound guidance timing: real time      Sterile ultrasound techniques: Sterile gel and sterile probe covers were used      Number of attempts:  1    Successful placement: yes    Post-procedure details:     Post-procedure:  Dressing applied    Assessment:  Blood return through all ports and free fluid flow    Procedure completion:  Tolerated           ED Course  ED Course as of 08/13/23 2019   Sun Aug 13, 2023   1515 EKG dated August 13 at 1505 hrs. shows a 88 bpm possible junctional rhythm, right bundle branch block T wave abnormalities which is nonspecific however there are ST segment elevations in leads aVR and V1, and ST depressions in aVL and aVF there is also ST elevations and lead III with T wave inversions in lead III Electronically signed by Edward Ziegler MD, 08/13/23, 3:15 PM EDT.   [JY]   1530 Interventional cardiology paged due to EKG changes  [JY]   1609 Potassium reported at 7.7 at 4:09 PM [JY]   1610 Glucose is 1348 [JY]   1611 Phosphorus(!): 14.4 [JY]   1625 Pharmacy called back and asked to switch BiCarbonate drip to 100mEq in 400 mL sterile water [JY]   1640 WBC(!!): 38.73 [JY]   1713 EKG done at 1710 shows normal sinus rhythm 95 bpm with QTc interval improved to 490 ms from 544, ST elevations in aVR have disappeared as well as most of the T wave peaked T waves and ST depressions in V3 have disappeared as well aVF morphology is much better at this time as well as QRS widening and lead II is also has disappeared and improved [JY]   1739 Femoral central line attempt urgently attempted but unsuccessful.  An IJ line was placed on the left side [JY]   1800 Dr Rodriguez from [facility] on the phone accepts patient's transfer to ICU for dialysis and further management [JY]      ED Course User Index  [JY]  Edward Ziegler MD                                           Medical Decision Making  Upon my evaluation, this patient had a high probability of imminent life-threatening deterioration due to DKA/Hyperglycemia and hypotension which required my direct attention, intervention and personal management.    I have personally provided 120 minutes of critical care, exclusive of time spent on separately billable procedures.  Critical care time inclusive of bedside management, and , documentation, review of data, radiology and lab results, discussion with consultants and family and monitoring for potential deterioration and frequent reexaminations.    Amount and/or Complexity of Data Reviewed  Labs: ordered.  Radiology: ordered.  ECG/medicine tests: ordered.  Discussion of management or test interpretation with external provider(s): Dr Alina Epperson  OTC drugs.  Prescription drug management.        Final diagnoses:   Diabetic ketoacidosis without coma associated with type 1 diabetes mellitus   Hyperosmolar hyperglycemic state (HHS)   Acute renal failure, unspecified acute renal failure type   Hyperkalemia   Hypotension, unspecified hypotension type       ED Disposition  ED Disposition       ED Disposition   Transfer to Another Facility     Condition   --    Comment   [facility] ICU               No follow-up provider specified.       Medication List      No changes were made to your prescriptions during this visit.            Edward Ziegler MD  08/13/23 2020

## 2023-08-14 LAB
BACTERIA BLD CULT: ABNORMAL
GLUCOSE BLDC GLUCOMTR-MCNC: >599 MG/DL (ref 70–130)
OSMOLALITY SERPL: 381 MOSM/KG (ref 275–300)

## 2023-08-15 LAB
QT INTERVAL: 390 MS
QT INTERVAL: 450 MS
QTC INTERVAL: 490 MS
QTC INTERVAL: 544 MS

## 2023-08-16 LAB
BACTERIA SPEC AEROBE CULT: ABNORMAL
GRAM STN SPEC: ABNORMAL
ISOLATED FROM: ABNORMAL

## 2023-08-18 LAB — BACTERIA SPEC AEROBE CULT: NORMAL

## 2023-08-18 RX ORDER — INSULIN PMP CART,AUT,G6/7,CNTR
EACH SUBCUTANEOUS
Qty: 1 KIT | Refills: 0 | Status: SHIPPED | OUTPATIENT
Start: 2023-08-18

## 2023-08-23 ENCOUNTER — OFFICE VISIT (OUTPATIENT)
Dept: ENDOCRINOLOGY | Facility: CLINIC | Age: 48
End: 2023-08-23
Payer: MEDICAID

## 2023-08-23 VITALS
WEIGHT: 236 LBS | HEIGHT: 66 IN | HEART RATE: 93 BPM | OXYGEN SATURATION: 97 % | BODY MASS INDEX: 37.93 KG/M2 | SYSTOLIC BLOOD PRESSURE: 110 MMHG | DIASTOLIC BLOOD PRESSURE: 86 MMHG

## 2023-08-23 DIAGNOSIS — E13.9 LATENT AUTOIMMUNE DIABETES IN ADULTS (LADA), MANAGED AS TYPE 1: Primary | ICD-10-CM

## 2023-08-23 LAB — GLUCOSE BLDC GLUCOMTR-MCNC: 211 MG/DL (ref 70–130)

## 2023-08-23 NOTE — PROGRESS NOTES
Chief Complaint   Patient presents with    Diabetes        Referring Provider  No ref. provider found     HPI   Marquita Vernon is a 48 y.o. female had concerns including Diabetes.    T2DM.    Patient noted most recent to be Latent onset Type 1 diabetic with positive CHEYANNE antibodies.    She was recently in the hospital with DKA.  When she left  they informed her to take Levemir 30 units at night and give short acting insulin with meals.  Since she has been home she has replaced her OmniPod and started wearing that but has continued to give Levemir 30 units at night along with this.  She woke up with Bg in the 30's and took a glucagon injection.  She has been having values in the 30-70's the last couple of days.    Basal: 1.55 u/hr    Diabetes:  Diabetes was diagnosed 2009.  Complications include neuropathy.  Last ophtho exam was Feb 2022, [facility].  Current medications for diabetes include Novolog in an Omnipod insulin pump.  Past meds: Metformin-GI issues, Januvia-insurance issue  She checks her blood sugar with Dexcom CGM.  Hypos: occasionally, mostly overnight/early morning    She was recently in the hospital for    ACE/ARB:no, Statin: no  Labs:  A1C:8.3 (5/2022)    The following portions of the patient's history were reviewed and updated as appropriate: allergies, current medications, past family history, past medical history, past social history, past surgical history and problem list.    Diet:   Breakfast: doesn't typically eat  Lunch: salad or left overs  Dinner: veggies, meat,   Drinks: diet mtn dew, milk  Snacks: popcorn    Past Medical History:   Diagnosis Date    Abnormal ECG     Arthritis     Asthma     Diabetes mellitus     GERD (gastroesophageal reflux disease)     IBS (irritable bowel syndrome)     Pancreatitis 2017    Scoliosis      Past Surgical History:   Procedure Laterality Date    CARDIOVASCULAR STRESS TEST  11/27/2018    L. Cardiolite- Unable to walk. EF 69%. Negative.  "   CHOLECYSTECTOMY      COLONOSCOPY      COLONOSCOPY N/A 9/21/2018    Procedure: COLONOSCOPY CPT CODE: 89775;  Surgeon: Micah Swartz MD;  Location:  [facility] OR;  Service: Gastroenterology    CYST REMOVAL      ECHO - CONVERTED  11/27/2018    EF 65%. No AS. Mild MR    ENDOSCOPY      ENDOSCOPY N/A 9/21/2018    Procedure: ESOPHAGOGASTRODUODENOSCOPY WITH BIOPSY CPT CODE: 70943;  Surgeon: Micah Swartz MD;  Location:  [facility] OR;  Service: Gastroenterology    OTHER SURGICAL HISTORY  01/02/2019    Event monitor- baseline sinus, one 39 beat SVT, no V-tach, no AFib, no pauses    TUBAL ABDOMINAL LIGATION      UPPER GASTROINTESTINAL ENDOSCOPY  2015 2018      Family History   Problem Relation Age of Onset    Diabetes Other     Cancer Other     Lung cancer Other     Heart attack Father         MI in his late 40's    No Known Problems Sister       Social History     Socioeconomic History    Marital status: Single   Tobacco Use    Smoking status: Never    Smokeless tobacco: Never   Vaping Use    Vaping Use: Never used   Substance and Sexual Activity    Alcohol use: No    Drug use: No    Sexual activity: Defer      Allergies   Allergen Reactions    Ibuprofen Hives and Other (See Comments)     \"smiley\"    Latex Hives    Vaseline [Petrolatum] Hives      Current Outpatient Medications on File Prior to Visit   Medication Sig Dispense Refill    Continuous Blood Gluc Transmit (Dexcom G6 Transmitter) misc Change Every 3 (Three) Months. 1 each 3    gabapentin (NEURONTIN) 300 MG capsule Take 1 capsule by mouth 3 (Three) Times a Day As Needed (Pain).      Glucagon HCl (Glucagon Emergency) 1 MG/ML reconstituted solution Inject 1 mL as directed As Needed (severe hypoglycemia). 1 each 4    glucose blood (FREESTYLE LITE) test strip 1 each by Other route 4 (Four) Times a Day. 250 each 2    HYDROcodone-acetaminophen (NORCO) 5-325 MG per tablet Take 1 tablet by mouth Every 8 (Eight) Hours As Needed. Only takes PRN      Insulin Aspart " "(novoLOG) 100 UNIT/ML injection Inject 22-40 Units under the skin into the appropriate area as directed 3 (Three) Times a Day Before Meals. Up to 15 units as needed for elevated BG      Insulin Aspart (novoLOG) 100 UNIT/ML injection For use in insulin pump.   30 mL 2    insulin detemir (Levemir FlexPen) 100 UNIT/ML injection Inject 40 Units under the skin into the appropriate area as directed Every Night for 30 days. (Patient taking differently: Inject 50 Units under the skin into the appropriate area as directed Every Night.) 30 mL 0    Insulin Disposable Pump (Omnipod 5 G6 Intro, Gen 5,) kit USE AS DIRECTED. CHANGE EACH POD EVERY 72 HOURS. 1 kit 0    Jardiance 25 MG tablet tablet TAKE ONE TABLET BY MOUTH ONCE DAILY. 30 tablet 6    lactobacillus acidophilus (RISAQUAD) capsule capsule Take 1 capsule by mouth Daily. 90 capsule 3    Loratadine 10 MG capsule Take 1 capsule by mouth Every Night.      multivitamin (THERAGRAN) tablet tablet Take 1 tablet by mouth Daily.      omeprazole (priLOSEC) 20 MG capsule Take 2 capsules by mouth 2 (Two) Times a Day. 30 capsule 0    Ozempic, 1 MG/DOSE, 4 MG/3ML solution pen-injector INJECT 1 MILLIGRAM SUBCUTANEOUSLY EVERY WEEK 3 mL 2    PARoxetine (PAXIL) 10 MG tablet Take 1 tablet by mouth Every Morning.       No current facility-administered medications on file prior to visit.        Review of Systems   Constitutional:  Positive for fatigue. Negative for unexpected weight gain and unexpected weight loss.   Eyes: Negative.    Endocrine: Negative for polydipsia, polyphagia and polyuria.   Psychiatric/Behavioral:  Positive for sleep disturbance.    All other systems reviewed and are negative.     /86 (BP Location: Left arm, Patient Position: Sitting, Cuff Size: Adult)   Pulse 93   Ht 167.6 cm (66\")   Wt 107 kg (236 lb)   LMP 05/09/2018 (Approximate)   SpO2 97%   BMI 38.09 kg/mý      Physical Exam  Vitals reviewed.   Constitutional:       Appearance: Normal " appearance.   Eyes:      Extraocular Movements: Extraocular movements intact.   Cardiovascular:      Rate and Rhythm: Normal rate.   Pulmonary:      Effort: Pulmonary effort is normal.   Skin:     General: Skin is warm and dry.   Neurological:      General: No focal deficit present.      Mental Status: She is alert and oriented to person, place, and time.   Psychiatric:         Mood and Affect: Mood normal.         Behavior: Behavior normal.         Thought Content: Thought content normal.         Judgment: Judgment normal.     CMP:  Lab Results   Component Value Date     (C) 08/14/2023    BUN 65 (H) 08/13/2023    CREATININE 3.02 (H) 08/13/2023    EGFRIFNONA 77 04/27/2019    BCR 21.5 08/13/2023     08/13/2023    K 3.6 08/14/2023    CO2 5.7 (L) 08/13/2023    CALCIUM 7.9 (L) 08/13/2023    ALBUMIN 3.2 (L) 08/13/2023    BILITOT 0.3 08/13/2023    ALKPHOS 141 (H) 08/13/2023    AST 12 08/13/2023    ALT 13 08/13/2023     Lipid Panel:  No results found for: CHOL, TRIG, HDL, VLDL, LDL  HbA1c:  Lab Results   Component Value Date    HGBA1C 7.5 (H) 08/13/2023    HGBA1C 7.70 (H) 08/13/2023     Glucose:  Lab Results   Component Value Date    POCGLU 211 (A) 08/23/2023     Microalbumin:  Lab Results   Component Value Date    MALBCRERATIO  04/04/2023      Comment:      Unable to calculate     TSH:  Lab Results   Component Value Date    TSH 0.212 (L) 06/19/2023       Assessment and Plan    Diagnoses and all orders for this visit:    1. 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  It is likely that her increase in hypoglycemia is due to injecting long-acting insulin while wearing insulin pump running on basal settings.  -Adjustments to insulin pump settings  Decreased basal to 1.50 u/hr   -S/S hypoglycemia reviewed with Rule of 15's advised.  -Follow-up  in 2 weeks.         Orders:  -     POC Glucose, Blood         Return in about 2 weeks (around 9/6/2023) for Follow-up appointment. The patient was instructed to contact the clinic with any interval questions or concerns.        This document has been electronically signed by ALIE Gonzalez  August 23, 2023 14:47 EDT   Endocrinology

## 2023-08-23 NOTE — ASSESSMENT & PLAN NOTE
-Discussed that with the use of the insulin pump she does not need to be injecting long-acting and short acting insulin.  It is likely that her increase in hypoglycemia is due to injecting long-acting insulin while wearing insulin pump running on basal settings.  -Adjustments to insulin pump settings  Decreased basal to 1.50 u/hr   -S/S hypoglycemia reviewed with Rule of 15's advised.  -Follow-up in 2 weeks.

## 2023-08-28 RX ORDER — INSULIN ASPART 100 [IU]/ML
10 INJECTION, SOLUTION INTRAVENOUS; SUBCUTANEOUS
Qty: 15 ML | Refills: 2 | Status: SHIPPED | OUTPATIENT
Start: 2023-08-28

## 2023-09-05 NOTE — TELEPHONE ENCOUNTER
Patient states the her on her last day of wearing her pump that her sugars are running high. She thinks the insulin is running out before 3 days are up. She has previous had to change the pod every other day. She is wanting to see if we can send in script to reflect that.

## 2023-09-06 NOTE — TELEPHONE ENCOUNTER
Rx Refill Note  Requested Prescriptions     Pending Prescriptions Disp Refills    Insulin Disposable Pump (Omnipod 5 G6 Pod, Gen 5,) misc       Sig: Use Every Other Day.      Last office visit with prescribing clinician: 8/23/2023     Next office visit with prescribing clinician: 9/19/2023       Praneeth Vernon MA  09/06/23, 10:10 EDT

## 2023-09-07 RX ORDER — INSULIN PMP CART,AUT,G6/7,CNTR
1 EACH SUBCUTANEOUS EVERY OTHER DAY
Qty: 45 EACH | Refills: 3 | Status: SHIPPED | OUTPATIENT
Start: 2023-09-07

## 2023-09-08 ENCOUNTER — TELEPHONE (OUTPATIENT)
Dept: ENDOCRINOLOGY | Facility: CLINIC | Age: 48
End: 2023-09-08
Payer: MEDICAID

## 2023-09-08 RX ORDER — PROCHLORPERAZINE 25 MG/1
1 SUPPOSITORY RECTAL TAKE AS DIRECTED
Qty: 1 EACH | Refills: 0 | Status: SHIPPED | OUTPATIENT
Start: 2023-09-08

## 2023-09-08 NOTE — TELEPHONE ENCOUNTER
PT CALLED REQUESTING A NEW  FOR HER DEXCOM G6 TO BE SENT IN TO [facility] IN [place], [place].

## 2023-09-11 RX ORDER — GLUCAGON 1 MG
1 KIT INJECTION AS NEEDED
Qty: 1 EACH | Refills: 4 | Status: CANCELLED | OUTPATIENT
Start: 2023-09-11

## 2023-09-12 RX ORDER — INSULIN ASPART 100 [IU]/ML
INJECTION, SOLUTION INTRAVENOUS; SUBCUTANEOUS
Qty: 30 ML | Refills: 2 | Status: SHIPPED | OUTPATIENT
Start: 2023-09-12

## 2023-09-12 RX ORDER — BLOOD-GLUCOSE METER
1 KIT MISCELLANEOUS 4 TIMES DAILY
Qty: 250 EACH | Refills: 2 | Status: SHIPPED | OUTPATIENT
Start: 2023-09-12

## 2023-09-12 RX ORDER — GLUCAGON HYDROCHLORIDE 1 MG
1 KIT INJECTION ONCE
Qty: 1 EACH | Refills: 0 | Status: SHIPPED | OUTPATIENT
Start: 2023-09-12 | End: 2023-09-15

## 2023-09-12 RX ORDER — GLUCAGON HYDROCHLORIDE 1 MG
1 KIT INJECTION ONCE
Qty: 1 EACH | Refills: 0 | OUTPATIENT
Start: 2023-09-12 | End: 2023-09-12

## 2023-09-12 NOTE — TELEPHONE ENCOUNTER
Called and notified the patient's mom, Rachel, of Dr. Borges's below message.  She verbalized understanding.  She asked that pharmacy be changed to [facility] in [place].  Pharmacy updated.  Rachel had no further questions or concerns at this time.    Rx Refill Note  Requested Prescriptions     Pending Prescriptions Disp Refills    glucose blood (FREESTYLE LITE) test strip 250 each 2     Si each by Other route 4 (Four) Times a Day.    Insulin Aspart (novoLOG) 100 UNIT/ML injection 30 mL 2     Sig: For use in insulin pump.      glucagon (GlucaGen HypoKit) 1 MG injection       Sig: Infuse  into a venous catheter 1 (One) Time for 1 dose.    Dx Code:  E10.65  Last office visit with prescribing clinician: 2023   Last telemedicine visit with prescribing clinician: Visit date not found   Next office visit with prescribing clinician: 2023                         Would you like a call back once the refill request has been completed: [] Yes [] No    If the office needs to give you a call back, can they leave a voicemail: [] Yes [] No    Zahira Perez MA  23, 13:39 EDT

## 2023-09-14 ENCOUNTER — TELEPHONE (OUTPATIENT)
Dept: ENDOCRINOLOGY | Facility: CLINIC | Age: 48
End: 2023-09-14

## 2023-09-14 NOTE — TELEPHONE ENCOUNTER
Caller: Marquita Vernon    Relationship: Self    Best call back number: [phone] (home)       Requested Prescriptions: PT IS REQUESTING LANCETS FOR HER FREESTYLE LITE  Requested Prescriptions      No prescriptions requested or ordered in this encounter        Pharmacy where request should be sent:  [id number]     Last office visit with prescribing clinician: 8/23/2023   Last telemedicine visit with prescribing clinician: Visit date not found   Next office visit with prescribing clinician: 9/19/2023     Additional details provided by patient:     Does the patient have less than a 3 day supply:  [] Yes  [x] No    Would you like a call back once the refill request has been completed: [] Yes [x] No    If the office needs to give you a call back, can they leave a voicemail: [] Yes [x] No    Steve Taylor Rep   09/14/23 11:29 EDT

## 2023-09-19 ENCOUNTER — OFFICE VISIT (OUTPATIENT)
Dept: ENDOCRINOLOGY | Facility: CLINIC | Age: 48
End: 2023-09-19
Payer: MEDICAID

## 2023-09-19 VITALS
BODY MASS INDEX: 37.77 KG/M2 | WEIGHT: 235 LBS | HEIGHT: 66 IN | OXYGEN SATURATION: 96 % | DIASTOLIC BLOOD PRESSURE: 74 MMHG | HEART RATE: 87 BPM | SYSTOLIC BLOOD PRESSURE: 126 MMHG

## 2023-09-19 DIAGNOSIS — E13.9 LATENT AUTOIMMUNE DIABETES IN ADULTS (LADA), MANAGED AS TYPE 1: Primary | ICD-10-CM

## 2023-09-19 PROCEDURE — 95251 CONT GLUC MNTR ANALYSIS I&R: CPT | Performed by: NURSE PRACTITIONER

## 2023-09-19 PROCEDURE — 1160F RVW MEDS BY RX/DR IN RCRD: CPT | Performed by: NURSE PRACTITIONER

## 2023-09-19 PROCEDURE — 1159F MED LIST DOCD IN RCRD: CPT | Performed by: NURSE PRACTITIONER

## 2023-09-19 PROCEDURE — 99214 OFFICE O/P EST MOD 30 MIN: CPT | Performed by: NURSE PRACTITIONER

## 2023-09-19 PROCEDURE — 3051F HG A1C>EQUAL 7.0%<8.0%: CPT | Performed by: NURSE PRACTITIONER

## 2023-09-19 RX ORDER — LANCETS 28 GAUGE
EACH MISCELLANEOUS
Qty: 100 EACH | Refills: 3 | Status: SHIPPED | OUTPATIENT
Start: 2023-09-19

## 2023-09-19 NOTE — PROGRESS NOTES
Chief Complaint   Patient presents with    Diabetes        Referring Provider  No ref. provider found     HPI   Marquita Vernon is a 48 y.o. female had concerns including Diabetes.    T2DM.    Patient noted most recent to be Latent onset Type 1 diabetic with positive CHEYANNE antibodies.  She has been noting improving BG's recently without hypos.    Current pump settings:  Basal: 1.55 u/hr  CR: 1:15  ISF: 23     Diabetes:  Diabetes was diagnosed 2009.  Complications include neuropathy.  Last ophtho exam was Feb 2022, [facility].  Current medications for diabetes include Novolog in an Omnipod insulin pump.  Past meds: Metformin-GI issues, Januvia-insurance issue  She checks her blood sugar with Dexcom CGM.  Hypos: occasionally, mostly overnight/early morning    She was recently in the hospital for    ACE/ARB:no, Statin: no  Labs:  A1C:8.3 (5/2022)    The following portions of the patient's history were reviewed and updated as appropriate: allergies, current medications, past family history, past medical history, past social history, past surgical history and problem list.    Diet:   Breakfast: doesn't typically eat  Lunch: salad or left overs  Dinner: veggies, meat,   Drinks: diet mtn dew, milk  Snacks: popcorn    Past Medical History:   Diagnosis Date    Abnormal ECG     Arthritis     Asthma     Diabetes mellitus     GERD (gastroesophageal reflux disease)     IBS (irritable bowel syndrome)     Pancreatitis 2017    Scoliosis      Past Surgical History:   Procedure Laterality Date    CARDIOVASCULAR STRESS TEST  11/27/2018    L. Cardiolite- Unable to walk. EF 69%. Negative.    CHOLECYSTECTOMY      COLONOSCOPY      COLONOSCOPY N/A 9/21/2018    Procedure: COLONOSCOPY CPT CODE: 02095;  Surgeon: Micah Swartz MD;  Location: [facility];  Service: Gastroenterology    CYST REMOVAL      ECHO - CONVERTED  11/27/2018    EF 65%. No AS. Mild MR    ENDOSCOPY      ENDOSCOPY N/A 9/21/2018    Procedure:  "ESOPHAGOGASTRODUODENOSCOPY WITH BIOPSY CPT CODE: 83421;  Surgeon: Micah Swartz MD;  Location: [facility] OR;  Service: Gastroenterology    OTHER SURGICAL HISTORY  01/02/2019    Event monitor- baseline sinus, one 39 beat SVT, no V-tach, no AFib, no pauses    TUBAL ABDOMINAL LIGATION      UPPER GASTROINTESTINAL ENDOSCOPY  2015 2018      Family History   Problem Relation Age of Onset    Diabetes Other     Cancer Other     Lung cancer Other     Heart attack Father         MI in his late 40's    No Known Problems Sister       Social History     Socioeconomic History    Marital status: Single   Tobacco Use    Smoking status: Never    Smokeless tobacco: Never   Vaping Use    Vaping Use: Never used   Substance and Sexual Activity    Alcohol use: No    Drug use: No    Sexual activity: Defer      Allergies   Allergen Reactions    Ibuprofen Hives and Other (See Comments)     \"smiley\"    Latex Hives    Vaseline [Petrolatum] Hives      Current Outpatient Medications on File Prior to Visit   Medication Sig Dispense Refill    Continuous Blood Gluc  (Dexcom G6 ) device Use 1 each Take As Directed. 1 each 0    Continuous Blood Gluc Transmit (Dexcom G6 Transmitter) misc Change Every 3 (Three) Months. 1 each 3    glucose blood (FREESTYLE LITE) test strip 1 each by Other route 4 (Four) Times a Day. 250 each 2    HYDROcodone-acetaminophen (NORCO) 5-325 MG per tablet Take 1 tablet by mouth Every 8 (Eight) Hours As Needed. Only takes PRN      Insulin Aspart (novoLOG) 100 UNIT/ML injection Inject 10 Units under the skin into the appropriate area as directed 3 (Three) Times a Day Before Meals. Up to 15 units as needed for elevated BG 15 mL 2    Insulin Aspart (novoLOG) 100 UNIT/ML injection For use in insulin pump.   30 mL 2    Insulin Disposable Pump (Omnipod 5 G6 Intro, Gen 5,) kit USE AS DIRECTED. CHANGE EACH POD EVERY 72 HOURS. 1 kit 0    Insulin Disposable Pump (Omnipod 5 G6 Pod, Gen 5,) misc Use 1 each " "Every Other Day. 45 each 3    Jardiance 25 MG tablet tablet TAKE ONE TABLET BY MOUTH ONCE DAILY. 30 tablet 6    Loratadine 10 MG capsule Take 1 capsule by mouth Every Night.      multivitamin (THERAGRAN) tablet tablet Take 1 tablet by mouth Daily.      omeprazole (priLOSEC) 20 MG capsule Take 2 capsules by mouth 2 (Two) Times a Day. 30 capsule 0    PARoxetine (PAXIL) 10 MG tablet Take 1 tablet by mouth Every Morning.      gabapentin (NEURONTIN) 300 MG capsule Take 1 capsule by mouth 3 (Three) Times a Day As Needed (Pain). (Patient not taking: Reported on 9/19/2023)      insulin detemir (Levemir FlexPen) 100 UNIT/ML injection Inject 40 Units under the skin into the appropriate area as directed Every Night for 30 days. (Patient taking differently: Inject 50 Units under the skin into the appropriate area as directed Every Night.) 30 mL 0    lactobacillus acidophilus (RISAQUAD) capsule capsule Take 1 capsule by mouth Daily. (Patient not taking: Reported on 9/19/2023) 90 capsule 3    Ozempic, 1 MG/DOSE, 4 MG/3ML solution pen-injector INJECT 1 MILLIGRAM SUBCUTANEOUSLY EVERY WEEK (Patient not taking: Reported on 9/19/2023) 3 mL 2     No current facility-administered medications on file prior to visit.        Review of Systems   Constitutional:  Positive for fatigue. Negative for unexpected weight gain and unexpected weight loss.   Eyes: Negative.    Endocrine: Negative for polydipsia, polyphagia and polyuria.   Psychiatric/Behavioral:  Positive for sleep disturbance.    All other systems reviewed and are negative.     /74 (BP Location: Left arm, Patient Position: Sitting, Cuff Size: Adult)   Pulse 87   Ht 167.6 cm (66\")   Wt 107 kg (235 lb)   LMP 05/09/2018 (Approximate)   SpO2 96%   BMI 37.93 kg/m²      Physical Exam  Vitals reviewed.   Constitutional:       Appearance: Normal appearance.   Eyes:      Extraocular Movements: Extraocular movements intact.   Cardiovascular:      Rate and Rhythm: Normal rate. "   Pulmonary:      Effort: Pulmonary effort is normal.   Skin:     General: Skin is warm and dry.   Neurological:      General: No focal deficit present.      Mental Status: She is alert and oriented to person, place, and time.   Psychiatric:         Mood and Affect: Mood normal.         Behavior: Behavior normal.         Thought Content: Thought content normal.         Judgment: Judgment normal.     CMP:  Lab Results   Component Value Date     (C) 08/14/2023    BUN 65 (H) 08/13/2023    CREATININE 3.02 (H) 08/13/2023    EGFRIFNONA 77 04/27/2019    BCR 21.5 08/13/2023     08/13/2023    K 3.6 08/14/2023    CO2 5.7 (L) 08/13/2023    CALCIUM 7.9 (L) 08/13/2023    ALBUMIN 3.2 (L) 08/13/2023    BILITOT 0.3 08/13/2023    ALKPHOS 141 (H) 08/13/2023    AST 12 08/13/2023    ALT 13 08/13/2023     Lipid Panel:  No results found for: CHOL, TRIG, HDL, VLDL, LDL  HbA1c:  Lab Results   Component Value Date    HGBA1C 7.5 (H) 08/13/2023    HGBA1C 7.70 (H) 08/13/2023     Glucose:  Lab Results   Component Value Date    POCGLU 211 (A) 08/23/2023     Microalbumin:  Lab Results   Component Value Date    MALBCRERATIO  04/04/2023      Comment:      Unable to calculate     TSH:  Lab Results   Component Value Date    TSH 0.212 (L) 06/19/2023       Assessment and Plan    Diagnoses and all orders for this visit:    1. 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  -Discussed dietary and exercise guidelines with patient.  -Discussed the importance of yearly eye exams.  -Discussed the importance of checking BG's regularly. Continue using Dexcom CGM.    2 week pump data download is as follows:  Very High: 11%  High: 38%  In Range: 50%  Low: 1%  Very Low: 0%  Trend shows overall regulated BG's with some rare overt values overnight. Likely related to meals.    -Continue with current pump settings:  Basal: 1.55 u/hr  CR: 1:15  ISF: 23   -S/S  hypoglycemia reviewed with Rule of 15's advised.  -Follow-up in 2 months when A1c due.             Return in about 2 months (around 11/19/2023) for Follow-up appointment, A1C. The patient was instructed to contact the clinic with any interval questions or concerns.        This document has been electronically signed by ALIE Gonzalez  September 20, 2023 10:42 EDT   Endocrinology

## 2023-09-20 NOTE — ASSESSMENT & PLAN NOTE
-Diabetes remains stable with stable BG's.  -Discussed dietary and exercise guidelines with patient.  -Discussed the importance of yearly eye exams.  -Discussed the importance of checking BG's regularly. Continue using Dexcom CGM.    2 week pump data download is as follows:  Very High: 11%  High: 38%  In Range: 50%  Low: 1%  Very Low: 0%  Trend shows overall regulated BG's with some rare overt values overnight. Likely related to meals.    -Continue with current pump settings:  Basal: 1.55 u/hr  CR: 1:15  ISF: 23   -S/S hypoglycemia reviewed with Rule of 15's advised.  -Follow-up in 2 months when A1c due.

## 2023-09-25 RX ORDER — SEMAGLUTIDE 1.34 MG/ML
INJECTION, SOLUTION SUBCUTANEOUS
Qty: 3 ML | Refills: 2 | Status: SHIPPED | OUTPATIENT
Start: 2023-09-25 | End: 2023-09-26 | Stop reason: SDUPTHER

## 2023-09-26 RX ORDER — GLUCAGON HYDROCHLORIDE 1 MG
1 KIT INJECTION AS NEEDED
Qty: 1 EACH | Refills: 0 | Status: SHIPPED | OUTPATIENT
Start: 2023-09-26

## 2023-09-26 RX ORDER — SEMAGLUTIDE 1.34 MG/ML
1 INJECTION, SOLUTION SUBCUTANEOUS WEEKLY
Qty: 3 ML | Refills: 2 | Status: SHIPPED | OUTPATIENT
Start: 2023-09-26

## 2023-09-26 RX ORDER — BLOOD-GLUCOSE METER
1 KIT MISCELLANEOUS 4 TIMES DAILY
Qty: 250 EACH | Refills: 2 | Status: SHIPPED | OUTPATIENT
Start: 2023-09-26

## 2023-09-27 ENCOUNTER — HOSPITAL ENCOUNTER (EMERGENCY)
Facility: HOSPITAL | Age: 48
Discharge: HOME | End: 2023-09-28
Attending: EMERGENCY MEDICINE
Payer: MEDICAID

## 2023-09-27 DIAGNOSIS — R73.9 HYPERGLYCEMIA: Primary | ICD-10-CM

## 2023-09-27 LAB
ALBUMIN SERPL-MCNC: 4 G/DL (ref 3.5–5.2)
ALBUMIN/GLOB SERPL: 1.3 G/DL
ALP SERPL-CCNC: 106 U/L (ref 39–117)
ALT SERPL W P-5'-P-CCNC: 17 U/L (ref 1–33)
ANION GAP SERPL CALCULATED.3IONS-SCNC: 10.9 MMOL/L (ref 5–15)
AST SERPL-CCNC: 16 U/L (ref 1–32)
BASOPHILS # BLD AUTO: 0.08 10*3/MM3 (ref 0–0.2)
BASOPHILS NFR BLD AUTO: 0.9 % (ref 0–1.5)
BILIRUB SERPL-MCNC: 0.3 MG/DL (ref 0–1.2)
BUN SERPL-MCNC: 44 MG/DL (ref 6–20)
BUN/CREAT SERPL: 28.6 (ref 7–25)
CALCIUM SPEC-SCNC: 9.6 MG/DL (ref 8.6–10.5)
CHLORIDE SERPL-SCNC: 107 MMOL/L (ref 98–107)
CK SERPL-CCNC: 62 U/L (ref 20–180)
CO2 SERPL-SCNC: 20.1 MMOL/L (ref 22–29)
CREAT SERPL-MCNC: 1.54 MG/DL (ref 0.57–1)
CRP SERPL-MCNC: 1.26 MG/DL (ref 0–0.5)
DEPRECATED RDW RBC AUTO: 45.4 FL (ref 37–54)
EGFRCR SERPLBLD CKD-EPI 2021: 41.5 ML/MIN/1.73
EOSINOPHIL # BLD AUTO: 0.59 10*3/MM3 (ref 0–0.4)
EOSINOPHIL NFR BLD AUTO: 6.6 % (ref 0.3–6.2)
ERYTHROCYTE [DISTWIDTH] IN BLOOD BY AUTOMATED COUNT: 13.4 % (ref 12.3–15.4)
ERYTHROCYTE [SEDIMENTATION RATE] IN BLOOD: 25 MM/HR (ref 0–20)
GLOBULIN UR ELPH-MCNC: 3.2 GM/DL
GLUCOSE BLDC GLUCOMTR-MCNC: 236 MG/DL (ref 70–130)
GLUCOSE BLDC GLUCOMTR-MCNC: 45 MG/DL (ref 70–130)
GLUCOSE BLDC GLUCOMTR-MCNC: 95 MG/DL (ref 70–130)
GLUCOSE SERPL-MCNC: 112 MG/DL (ref 65–99)
HCT VFR BLD AUTO: 41.1 % (ref 34–46.6)
HGB BLD-MCNC: 12.4 G/DL (ref 12–15.9)
IMM GRANULOCYTES # BLD AUTO: 0.03 10*3/MM3 (ref 0–0.05)
IMM GRANULOCYTES NFR BLD AUTO: 0.3 % (ref 0–0.5)
LYMPHOCYTES # BLD AUTO: 2.89 10*3/MM3 (ref 0.7–3.1)
LYMPHOCYTES NFR BLD AUTO: 32.3 % (ref 19.6–45.3)
MAGNESIUM SERPL-MCNC: 2.2 MG/DL (ref 1.6–2.6)
MCH RBC QN AUTO: 27.9 PG (ref 26.6–33)
MCHC RBC AUTO-ENTMCNC: 30.2 G/DL (ref 31.5–35.7)
MCV RBC AUTO: 92.6 FL (ref 79–97)
MONOCYTES # BLD AUTO: 0.6 10*3/MM3 (ref 0.1–0.9)
MONOCYTES NFR BLD AUTO: 6.7 % (ref 5–12)
NEUTROPHILS NFR BLD AUTO: 4.75 10*3/MM3 (ref 1.7–7)
NEUTROPHILS NFR BLD AUTO: 53.2 % (ref 42.7–76)
NRBC BLD AUTO-RTO: 0 /100 WBC (ref 0–0.2)
PLATELET # BLD AUTO: 296 10*3/MM3 (ref 140–450)
PMV BLD AUTO: 12 FL (ref 6–12)
POTASSIUM SERPL-SCNC: 4.6 MMOL/L (ref 3.5–5.2)
PROT SERPL-MCNC: 7.2 G/DL (ref 6–8.5)
RBC # BLD AUTO: 4.44 10*6/MM3 (ref 3.77–5.28)
SODIUM SERPL-SCNC: 138 MMOL/L (ref 136–145)
T4 FREE SERPL-MCNC: 0.98 NG/DL (ref 0.93–1.7)
TSH SERPL DL<=0.05 MIU/L-ACNC: 0.71 UIU/ML (ref 0.27–4.2)
WBC NRBC COR # BLD: 8.94 10*3/MM3 (ref 3.4–10.8)

## 2023-09-27 PROCEDURE — 84443 ASSAY THYROID STIM HORMONE: CPT | Performed by: EMERGENCY MEDICINE

## 2023-09-27 PROCEDURE — 85025 COMPLETE CBC W/AUTO DIFF WBC: CPT | Performed by: EMERGENCY MEDICINE

## 2023-09-27 PROCEDURE — 82948 REAGENT STRIP/BLOOD GLUCOSE: CPT

## 2023-09-27 PROCEDURE — 85652 RBC SED RATE AUTOMATED: CPT | Performed by: EMERGENCY MEDICINE

## 2023-09-27 PROCEDURE — 36415 COLL VENOUS BLD VENIPUNCTURE: CPT

## 2023-09-27 PROCEDURE — 80053 COMPREHEN METABOLIC PANEL: CPT | Performed by: EMERGENCY MEDICINE

## 2023-09-27 PROCEDURE — 83735 ASSAY OF MAGNESIUM: CPT | Performed by: EMERGENCY MEDICINE

## 2023-09-27 PROCEDURE — 86140 C-REACTIVE PROTEIN: CPT | Performed by: EMERGENCY MEDICINE

## 2023-09-27 PROCEDURE — 84439 ASSAY OF FREE THYROXINE: CPT | Performed by: EMERGENCY MEDICINE

## 2023-09-27 PROCEDURE — 82550 ASSAY OF CK (CPK): CPT | Performed by: EMERGENCY MEDICINE

## 2023-09-27 PROCEDURE — 99283 EMERGENCY DEPT VISIT LOW MDM: CPT

## 2023-09-27 RX ORDER — DEXTROSE MONOHYDRATE 25 G/50ML
INJECTION, SOLUTION INTRAVENOUS
Status: COMPLETED
Start: 2023-09-27 | End: 2023-09-27

## 2023-09-27 RX ORDER — SODIUM CHLORIDE 0.9 % (FLUSH) 0.9 %
10 SYRINGE (ML) INJECTION AS NEEDED
Status: DISCONTINUED | OUTPATIENT
Start: 2023-09-27 | End: 2023-09-28 | Stop reason: HOSPADM

## 2023-09-27 RX ADMIN — SODIUM CHLORIDE 500 ML: 9 INJECTION, SOLUTION INTRAVENOUS at 20:08

## 2023-09-27 RX ADMIN — DEXTROSE MONOHYDRATE: 25 INJECTION, SOLUTION INTRAVENOUS at 21:38

## 2023-09-28 VITALS
WEIGHT: 230 LBS | DIASTOLIC BLOOD PRESSURE: 89 MMHG | BODY MASS INDEX: 36.96 KG/M2 | HEART RATE: 97 BPM | TEMPERATURE: 98.2 F | HEIGHT: 66 IN | RESPIRATION RATE: 17 BRPM | SYSTOLIC BLOOD PRESSURE: 120 MMHG | OXYGEN SATURATION: 99 %

## 2023-09-29 NOTE — ED PROVIDER NOTES
Subjective     History provided by:  Patient   used: No    Weakness - Generalized  Severity:  Mild  Onset quality:  Gradual  Timing:  Constant  Progression:  Waxing and waning  Chronicity:  Chronic  Context: dehydration    Context: not alcohol use, not allergies, not change in medication, not decreased sleep, not drug use, not increased activity, not pinched nerve, not recent infection, not stress and not urinary tract infection    Relieved by:  Nothing  Worsened by:  Nothing  Ineffective treatments:  None tried  Associated symptoms: no abdominal pain, no anorexia, no aphasia, no arthralgias, no ataxia, no chest pain, no cough, no diarrhea, no difficulty walking, no dizziness, no drooling, no dysphagia, no dysuria, no numbness in extremities, no falls, no fever, no foul-smelling urine, no frequency, no headaches, no hematochezia, no lethargy, no loss of consciousness, no melena, no myalgias, no nausea, no near-syncope, no seizures, no sensory-motor deficit, no shortness of breath, no stroke symptoms, no syncope, no urgency, no vision change and no vomiting    Risk factors: diabetes    Risk factors: no anemia, no congestive heart failure, no coronary artery disease, no excessive menstruation, no family hx of stroke, no heart disease, no neurologic disease, no new medications and no recent stressors      Review of Systems   Constitutional:  Negative for activity change, appetite change, chills, diaphoresis, fatigue and fever.   HENT:  Negative for congestion, drooling, ear pain and sore throat.    Eyes:  Negative for redness.   Respiratory:  Negative for cough, chest tightness, shortness of breath and wheezing.    Cardiovascular:  Negative for chest pain, palpitations, leg swelling, syncope and near-syncope.   Gastrointestinal:  Negative for abdominal pain, anorexia, diarrhea, dysphagia, hematochezia, melena, nausea and vomiting.   Genitourinary:  Negative for dysuria, frequency and urgency.  "  Musculoskeletal:  Negative for arthralgias, back pain, falls, myalgias and neck pain.   Skin:  Negative for pallor, rash and wound.   Neurological:  Positive for weakness. Negative for dizziness, seizures, loss of consciousness, speech difficulty and headaches.   Psychiatric/Behavioral:  Negative for agitation, behavioral problems, confusion and decreased concentration.    All other systems reviewed and are negative.    Past Medical History:   Diagnosis Date    Abnormal ECG     Arthritis     Asthma     Diabetes mellitus     GERD (gastroesophageal reflux disease)     IBS (irritable bowel syndrome)     Pancreatitis 2017    Scoliosis        Allergies   Allergen Reactions    Ibuprofen Hives and Other (See Comments)     \"smiley\"    Latex Hives    Vaseline [Petrolatum] Hives       Past Surgical History:   Procedure Laterality Date    CARDIOVASCULAR STRESS TEST  11/27/2018    L. Cardiolite- Unable to walk. EF 69%. Negative.    CHOLECYSTECTOMY      COLONOSCOPY      COLONOSCOPY N/A 9/21/2018    Procedure: COLONOSCOPY CPT CODE: 80316;  Surgeon: Micah Swartz MD;  Location: [facility];  Service: Gastroenterology    CYST REMOVAL      ECHO - CONVERTED  11/27/2018    EF 65%. No AS. Mild MR    ENDOSCOPY      ENDOSCOPY N/A 9/21/2018    Procedure: ESOPHAGOGASTRODUODENOSCOPY WITH BIOPSY CPT CODE: 06800;  Surgeon: Micah Swartz MD;  Location: [facility];  Service: Gastroenterology    OTHER SURGICAL HISTORY  01/02/2019    Event monitor- baseline sinus, one 39 beat SVT, no V-tach, no AFib, no pauses    TUBAL ABDOMINAL LIGATION      UPPER GASTROINTESTINAL ENDOSCOPY  2015 2018       Family History   Problem Relation Age of Onset    Diabetes Other     Cancer Other     Lung cancer Other     Heart attack Father         MI in his late 40's    No Known Problems Sister        Social History     Socioeconomic History    Marital status: Single   Tobacco Use    Smoking status: Never    Smokeless tobacco: Never   Vaping Use    " Vaping Use: Never used   Substance and Sexual Activity    Alcohol use: No    Drug use: No    Sexual activity: Defer           Objective   Physical Exam  Vitals and nursing note reviewed.   Constitutional:       General: She is not in acute distress.     Appearance: Normal appearance. She is well-developed. She is not toxic-appearing or diaphoretic.   HENT:      Head: Normocephalic and atraumatic.      Right Ear: External ear normal.      Left Ear: External ear normal.      Nose: Nose normal.      Mouth/Throat:      Pharynx: No oropharyngeal exudate.      Tonsils: No tonsillar exudate.   Eyes:      General: Lids are normal.      Conjunctiva/sclera: Conjunctivae normal.      Pupils: Pupils are equal, round, and reactive to light.   Neck:      Thyroid: No thyromegaly.   Cardiovascular:      Rate and Rhythm: Normal rate and regular rhythm.      Pulses: Normal pulses.      Heart sounds: Normal heart sounds, S1 normal and S2 normal.   Pulmonary:      Effort: Pulmonary effort is normal. No tachypnea or respiratory distress.      Breath sounds: Normal breath sounds. No decreased breath sounds, wheezing or rales.   Chest:      Chest wall: No tenderness.   Abdominal:      General: Bowel sounds are normal. There is no distension.      Palpations: Abdomen is soft.      Tenderness: There is no abdominal tenderness. There is no guarding or rebound.   Musculoskeletal:         General: No tenderness or deformity. Normal range of motion.      Cervical back: Full passive range of motion without pain, normal range of motion and neck supple.   Lymphadenopathy:      Cervical: No cervical adenopathy.   Skin:     General: Skin is warm and dry.      Coloration: Skin is not pale.      Findings: No erythema or rash.   Neurological:      Mental Status: She is alert and oriented to person, place, and time.      GCS: GCS eye subscore is 4. GCS verbal subscore is 5. GCS motor subscore is 6.      Cranial Nerves: No cranial nerve deficit.       Sensory: No sensory deficit.   Psychiatric:         Speech: Speech normal.         Behavior: Behavior normal.         Thought Content: Thought content normal.         Judgment: Judgment normal.       Procedures           ED Course                                           Medical Decision Making    History provided by:  Patient   used: No    Weakness - Generalized  Severity:  Mild  Onset quality:  Gradual  Timing:  Constant  Progression:  Waxing and waning  Chronicity:  Chronic  Context: dehydration    Context: not alcohol use, not allergies, not change in medication, not decreased sleep, not drug use, not increased activity, not pinched nerve, not recent infection, not stress and not urinary tract infection    Relieved by:  Nothing  Worsened by:  Nothing  Ineffective treatments:  None tried  Associated symptoms: no abdominal pain, no anorexia, no aphasia, no arthralgias, no ataxia, no chest pain, no cough, no diarrhea, no difficulty walking, no dizziness, no drooling, no dysphagia, no dysuria, no numbness in extremities, no falls, no fever, no foul-smelling urine, no frequency, no headaches, no hematochezia, no lethargy, no loss of consciousness, no melena, no myalgias, no nausea, no near-syncope, no seizures, no sensory-motor deficit, no shortness of breath, no stroke symptoms, no syncope, no urgency, no vision change and no vomiting    Risk factors: diabetes    Risk factors: no anemia, no congestive heart failure, no coronary artery disease, no excessive menstruation, no family hx of stroke, no heart disease, no neurologic disease, no new medications and no recent stressors      Problems Addressed:  Hyperglycemia: complicated acute illness or injury    Amount and/or Complexity of Data Reviewed  External Data Reviewed: labs and notes.  Labs: ordered. Decision-making details documented in ED Course.        Final diagnoses:   Hyperglycemia       ED Disposition  ED Disposition       ED Disposition    Discharge    Condition   Stable    Comment   --               Lan Guerra Mcfarland, APRN  [address]  [facility] [address]  [phone]    Schedule an appointment as soon as possible for a visit in 1 day  EVALUATE         Medication List        Changed      Levemir FlexPen 100 UNIT/ML injection  Generic drug: insulin detemir  Inject 40 Units under the skin into the appropriate area as directed Every Night for 30 days.  What changed: how much to take                 Devyn Parker MD  09/29/23 1218

## 2023-10-02 ENCOUNTER — TELEPHONE (OUTPATIENT)
Dept: ENDOCRINOLOGY | Facility: CLINIC | Age: 48
End: 2023-10-02
Payer: MEDICAID

## 2023-10-02 RX ORDER — PROCHLORPERAZINE 25 MG/1
SUPPOSITORY RECTAL
Qty: 3 EACH | Refills: 11 | Status: SHIPPED | OUTPATIENT
Start: 2023-10-02

## 2023-10-02 RX ORDER — PROCHLORPERAZINE 25 MG/1
1 SUPPOSITORY RECTAL TAKE AS DIRECTED
Qty: 1 EACH | Refills: 0 | Status: SHIPPED | OUTPATIENT
Start: 2023-10-02

## 2023-10-02 NOTE — TELEPHONE ENCOUNTER
Patient woke up this AM with high sugar. With Dexcom/Omnipod and her regular glucose meter it just says high. Patient has given herself 10 units of Aspart a little after 8 am. Pt did have to go to hospital last Wednesday because her sugar was high. They gave her an insulin drip and it bottomed her out. Patient is wanting to know what to do. Best call back is [phone].

## 2023-10-02 NOTE — TELEPHONE ENCOUNTER
She needs to do a couple of things.  Make sure that her pump is working properly.  I would have her change her pod out to a new one.  Also check to see if she has been sick or increased stress? Check to see if she giving adequate amounts with meals?

## 2023-10-03 NOTE — TELEPHONE ENCOUNTER
Patient called back and she found out that her Omnipod was not on her all the way and she wasn't getting full dose. She did switch her pod out and made sure it was attached correctly. Her sugar levels are much better today.

## 2023-10-11 ENCOUNTER — HOSPITAL ENCOUNTER (EMERGENCY)
Facility: HOSPITAL | Age: 48
Discharge: HOME | End: 2023-10-11
Attending: STUDENT IN AN ORGANIZED HEALTH CARE EDUCATION/TRAINING PROGRAM
Payer: MEDICAID

## 2023-10-11 VITALS
DIASTOLIC BLOOD PRESSURE: 78 MMHG | BODY MASS INDEX: 36.96 KG/M2 | OXYGEN SATURATION: 99 % | WEIGHT: 230 LBS | SYSTOLIC BLOOD PRESSURE: 119 MMHG | HEART RATE: 86 BPM | HEIGHT: 66 IN | TEMPERATURE: 97.7 F | RESPIRATION RATE: 18 BRPM

## 2023-10-11 DIAGNOSIS — E11.649 HYPOGLYCEMIA ASSOCIATED WITH DIABETES: Primary | ICD-10-CM

## 2023-10-11 LAB
ALBUMIN SERPL-MCNC: 4.3 G/DL (ref 3.5–5.2)
ALBUMIN/GLOB SERPL: 1.4 G/DL
ALP SERPL-CCNC: 104 U/L (ref 39–117)
ALT SERPL W P-5'-P-CCNC: 25 U/L (ref 1–33)
ANION GAP SERPL CALCULATED.3IONS-SCNC: 10 MMOL/L (ref 5–15)
AST SERPL-CCNC: 32 U/L (ref 1–32)
BACTERIA UR QL AUTO: ABNORMAL /HPF
BASOPHILS # BLD AUTO: 0.07 10*3/MM3 (ref 0–0.2)
BASOPHILS NFR BLD AUTO: 0.7 % (ref 0–1.5)
BILIRUB SERPL-MCNC: 0.2 MG/DL (ref 0–1.2)
BILIRUB UR QL STRIP: NEGATIVE
BUN SERPL-MCNC: 27 MG/DL (ref 6–20)
BUN/CREAT SERPL: 24.5 (ref 7–25)
CALCIUM SPEC-SCNC: 9.4 MG/DL (ref 8.6–10.5)
CHLORIDE SERPL-SCNC: 106 MMOL/L (ref 98–107)
CLARITY UR: CLEAR
CO2 SERPL-SCNC: 23 MMOL/L (ref 22–29)
COLOR UR: YELLOW
CREAT SERPL-MCNC: 1.1 MG/DL (ref 0.57–1)
DEPRECATED RDW RBC AUTO: 47 FL (ref 37–54)
EGFRCR SERPLBLD CKD-EPI 2021: 62.1 ML/MIN/1.73
EOSINOPHIL # BLD AUTO: 0.65 10*3/MM3 (ref 0–0.4)
EOSINOPHIL NFR BLD AUTO: 7 % (ref 0.3–6.2)
ERYTHROCYTE [DISTWIDTH] IN BLOOD BY AUTOMATED COUNT: 13.8 % (ref 12.3–15.4)
GLOBULIN UR ELPH-MCNC: 3.1 GM/DL
GLUCOSE BLDC GLUCOMTR-MCNC: 93 MG/DL (ref 70–130)
GLUCOSE BLDC GLUCOMTR-MCNC: 98 MG/DL (ref 70–130)
GLUCOSE SERPL-MCNC: 119 MG/DL (ref 65–99)
GLUCOSE UR STRIP-MCNC: NEGATIVE MG/DL
HCT VFR BLD AUTO: 39.7 % (ref 34–46.6)
HGB BLD-MCNC: 12 G/DL (ref 12–15.9)
HGB UR QL STRIP.AUTO: NEGATIVE
HOLD SPECIMEN: NORMAL
HOLD SPECIMEN: NORMAL
HYALINE CASTS UR QL AUTO: ABNORMAL /LPF
IMM GRANULOCYTES # BLD AUTO: 0.04 10*3/MM3 (ref 0–0.05)
IMM GRANULOCYTES NFR BLD AUTO: 0.4 % (ref 0–0.5)
KETONES UR QL STRIP: NEGATIVE
LEUKOCYTE ESTERASE UR QL STRIP.AUTO: ABNORMAL
LIPASE SERPL-CCNC: 43 U/L (ref 13–60)
LYMPHOCYTES # BLD AUTO: 1.69 10*3/MM3 (ref 0.7–3.1)
LYMPHOCYTES NFR BLD AUTO: 18.1 % (ref 19.6–45.3)
MAGNESIUM SERPL-MCNC: 2.1 MG/DL (ref 1.6–2.6)
MCH RBC QN AUTO: 27.9 PG (ref 26.6–33)
MCHC RBC AUTO-ENTMCNC: 30.2 G/DL (ref 31.5–35.7)
MCV RBC AUTO: 92.3 FL (ref 79–97)
MONOCYTES # BLD AUTO: 0.56 10*3/MM3 (ref 0.1–0.9)
MONOCYTES NFR BLD AUTO: 6 % (ref 5–12)
NEUTROPHILS NFR BLD AUTO: 6.33 10*3/MM3 (ref 1.7–7)
NEUTROPHILS NFR BLD AUTO: 67.8 % (ref 42.7–76)
NITRITE UR QL STRIP: NEGATIVE
NRBC BLD AUTO-RTO: 0 /100 WBC (ref 0–0.2)
PH UR STRIP.AUTO: <=5 [PH] (ref 5–8)
PLATELET # BLD AUTO: 255 10*3/MM3 (ref 140–450)
PMV BLD AUTO: 11.3 FL (ref 6–12)
POTASSIUM SERPL-SCNC: 4.9 MMOL/L (ref 3.5–5.2)
PROT SERPL-MCNC: 7.4 G/DL (ref 6–8.5)
PROT UR QL STRIP: NEGATIVE
RBC # BLD AUTO: 4.3 10*6/MM3 (ref 3.77–5.28)
RBC # UR STRIP: ABNORMAL /HPF
REF LAB TEST METHOD: ABNORMAL
SODIUM SERPL-SCNC: 139 MMOL/L (ref 136–145)
SP GR UR STRIP: 1.01 (ref 1–1.03)
SQUAMOUS #/AREA URNS HPF: ABNORMAL /HPF
TRANS CELLS #/AREA URNS HPF: ABNORMAL /HPF
UROBILINOGEN UR QL STRIP: ABNORMAL
WBC # UR STRIP: ABNORMAL /HPF
WBC NRBC COR # BLD: 9.34 10*3/MM3 (ref 3.4–10.8)
WHOLE BLOOD HOLD COAG: NORMAL
WHOLE BLOOD HOLD SPECIMEN: NORMAL

## 2023-10-11 PROCEDURE — 85025 COMPLETE CBC W/AUTO DIFF WBC: CPT | Performed by: PHYSICIAN ASSISTANT

## 2023-10-11 PROCEDURE — 83735 ASSAY OF MAGNESIUM: CPT | Performed by: PHYSICIAN ASSISTANT

## 2023-10-11 PROCEDURE — 83690 ASSAY OF LIPASE: CPT | Performed by: PHYSICIAN ASSISTANT

## 2023-10-11 PROCEDURE — 99283 EMERGENCY DEPT VISIT LOW MDM: CPT

## 2023-10-11 PROCEDURE — 81001 URINALYSIS AUTO W/SCOPE: CPT | Performed by: PHYSICIAN ASSISTANT

## 2023-10-11 PROCEDURE — 36415 COLL VENOUS BLD VENIPUNCTURE: CPT

## 2023-10-11 PROCEDURE — 80053 COMPREHEN METABOLIC PANEL: CPT | Performed by: PHYSICIAN ASSISTANT

## 2023-10-11 PROCEDURE — 82948 REAGENT STRIP/BLOOD GLUCOSE: CPT

## 2023-10-11 RX ORDER — SODIUM CHLORIDE 0.9 % (FLUSH) 0.9 %
10 SYRINGE (ML) INJECTION AS NEEDED
Status: DISCONTINUED | OUTPATIENT
Start: 2023-10-11 | End: 2023-10-11 | Stop reason: HOSPADM

## 2023-10-11 NOTE — ED PROVIDER NOTES
"Subjective   History of Present Illness  48-year-old female presents to ED with chief complaint of hypoglycemia.  Patient verbalized that she was awakened by her Dexcom alarm.  Noticed that her sugar was reading low less than 35.  Patient did take glucose tablets which helped bring her glucose back to normal levels.  Patient does have a insulin pump.    History provided by:  Patient  Hypoglycemia  Initial blood sugar:  35  Blood sugar after intervention:  98  Severity:  Moderate  Onset quality:  Sudden  Duration:  2 hours  Timing:  Intermittent  Progression:  Improving  Chronicity:  Recurrent  Diabetic status:  Controlled with insulin  Current diabetic therapy:  Insulin pump  Associated symptoms: altered mental status        Review of Systems   Constitutional: Negative.  Negative for fever.   HENT: Negative.     Respiratory: Negative.     Cardiovascular: Negative.  Negative for chest pain.   Gastrointestinal: Negative.  Negative for abdominal pain.   Endocrine: Negative.    Genitourinary: Negative.  Negative for dysuria.   Skin: Negative.    Neurological: Negative.    Psychiatric/Behavioral:  Positive for confusion.    All other systems reviewed and are negative.      Past Medical History:   Diagnosis Date    Abnormal ECG     Arthritis     Asthma     Diabetes mellitus     GERD (gastroesophageal reflux disease)     IBS (irritable bowel syndrome)     Pancreatitis 2017    Scoliosis        Allergies   Allergen Reactions    Ibuprofen Hives and Other (See Comments)     \"smiley\"    Latex Hives    Vaseline [Petrolatum] Hives       Past Surgical History:   Procedure Laterality Date    CARDIOVASCULAR STRESS TEST  11/27/2018    L. Cardiolite- Unable to walk. EF 69%. Negative.    CHOLECYSTECTOMY      COLONOSCOPY      COLONOSCOPY N/A 9/21/2018    Procedure: COLONOSCOPY CPT CODE: 14653;  Surgeon: Micah Swartz MD;  Location: [facility];  Service: Gastroenterology    CYST REMOVAL      ECHO - CONVERTED  11/27/2018    EF 65%. " No AS. Mild MR    ENDOSCOPY      ENDOSCOPY N/A 9/21/2018    Procedure: ESOPHAGOGASTRODUODENOSCOPY WITH BIOPSY CPT CODE: 55655;  Surgeon: Micah Swartz MD;  Location: [facility];  Service: Gastroenterology    OTHER SURGICAL HISTORY  01/02/2019    Event monitor- baseline sinus, one 39 beat SVT, no V-tach, no AFib, no pauses    TUBAL ABDOMINAL LIGATION      UPPER GASTROINTESTINAL ENDOSCOPY  2015 2018       Family History   Problem Relation Age of Onset    Diabetes Other     Cancer Other     Lung cancer Other     Heart attack Father         MI in his late 40's    No Known Problems Sister        Social History     Socioeconomic History    Marital status: Single   Tobacco Use    Smoking status: Never    Smokeless tobacco: Never   Vaping Use    Vaping Use: Never used   Substance and Sexual Activity    Alcohol use: No    Drug use: No    Sexual activity: Defer           Objective   Physical Exam  Vitals and nursing note reviewed.   Constitutional:       General: She is not in acute distress.     Appearance: She is well-developed. She is not diaphoretic.   HENT:      Head: Normocephalic and atraumatic.      Right Ear: External ear normal.      Left Ear: External ear normal.      Nose: Nose normal.   Eyes:      Conjunctiva/sclera: Conjunctivae normal.   Neck:      Vascular: No JVD.      Trachea: No tracheal deviation.   Cardiovascular:      Rate and Rhythm: Normal rate.      Heart sounds: No murmur heard.  Pulmonary:      Effort: Pulmonary effort is normal. No respiratory distress.      Breath sounds: No wheezing.   Abdominal:      Palpations: Abdomen is soft.      Tenderness: There is no abdominal tenderness.   Musculoskeletal:         General: No deformity. Normal range of motion.      Cervical back: Normal range of motion and neck supple.   Skin:     General: Skin is warm and dry.      Coloration: Skin is not pale.      Findings: No erythema or rash.   Neurological:      Mental Status: She is alert and oriented to  person, place, and time.      Cranial Nerves: No cranial nerve deficit.   Psychiatric:         Behavior: Behavior normal.         Thought Content: Thought content normal.         Procedures           ED Course                                           Medical Decision Making  48-year-old with acute episode of hypoglycemia.  Patient does suffer from diabetes.    Problems Addressed:  Hypoglycemia associated with diabetes: acute illness or injury     Details: Patient's hypoglycemia was able to be corrected with just oral intake.  Patient monitored in the ED for an hour.  No other findings of hypoglycemia.  Patient encouraged to follow-up with her endocrinologist.    Amount and/or Complexity of Data Reviewed  Labs: ordered.    Risk  Prescription drug management.        Final diagnoses:   Hypoglycemia associated with diabetes       ED Disposition  ED Disposition       ED Disposition   Discharge    Condition   Stable    Comment   --               Lan Guerra, APRN  [address] [address]  [facility] [id number] [phone]    Schedule an appointment as soon as possible for a visit            Medication List        Changed      Levemir FlexPen 100 UNIT/ML injection  Generic drug: insulin detemir  Inject 40 Units under the skin into the appropriate area as directed Every Night for 30 days.  What changed: how much to take                 Gene Flores II, PA  10/11/23 0600

## 2023-10-12 ENCOUNTER — TELEPHONE (OUTPATIENT)
Dept: ENDOCRINOLOGY | Facility: CLINIC | Age: 48
End: 2023-10-12
Payer: MEDICAID

## 2023-10-12 NOTE — TELEPHONE ENCOUNTER
Patient had to go to ER on 10/10/23 as she was having very low blood sugar readings. She had to take 4 doses of glucagon to get it back up. She says that she doesn't think her Omnipod is working correctly. Patient coming to see us on 10/16/23 as a work in.

## 2023-10-17 ENCOUNTER — OFFICE VISIT (OUTPATIENT)
Dept: ENDOCRINOLOGY | Facility: CLINIC | Age: 48
End: 2023-10-17
Payer: MEDICAID

## 2023-10-17 VITALS
HEIGHT: 66 IN | HEART RATE: 76 BPM | WEIGHT: 244 LBS | BODY MASS INDEX: 39.21 KG/M2 | DIASTOLIC BLOOD PRESSURE: 78 MMHG | SYSTOLIC BLOOD PRESSURE: 122 MMHG | OXYGEN SATURATION: 99 %

## 2023-10-17 DIAGNOSIS — E13.9 LATENT AUTOIMMUNE DIABETES IN ADULTS (LADA), MANAGED AS TYPE 1: Primary | ICD-10-CM

## 2023-10-17 NOTE — ASSESSMENT & PLAN NOTE
-Diabetes remains stable with stable BG's.  -Discussed dietary and exercise guidelines with patient.  -Discussed the importance of yearly eye exams.  -Discussed the importance of checking BG's regularly. Continue using Dexcom CGM.    2 week pump data download is as follows:  Very High: 11%  High: 40%  In Range: 48%  Low: 1%  Very Low: 0%  Trend shows overall regulated BG's with post supper hypers and rebound hypos.  Likely due to not administering enough with supper and auto bolus correcting resulting in hypos.  -Adjustment to current pump settings:  Basal:   12A: 1.4 u/hr  3A: 1.55 u/hr  CR: 1:15  ISF: 23   Discussed importance of administering meal time boluses to prevent overt hypers with rebound hypos.  -S/S hypoglycemia reviewed with Rule of 15's advised.  -Follow-up at next scheduled appt.

## 2023-10-17 NOTE — PROGRESS NOTES
Chief Complaint   Patient presents with    Diabetes        Referring Provider  No ref. provider found     HPI   Marquita Vernon is a 48 y.o. female had concerns including Diabetes.    T2DM.    Patient was recently in the ER for a hypoglycemia episode.  She has since been doing better.     Diabetes:  Diabetes was diagnosed 2009.  Complications include neuropathy.  Last ophtho exam was Feb 2022, [facility].  Current medications for diabetes include Novolog in an Omnipod insulin pump.  Past meds: Metformin-GI issues, Januvia-insurance issue  She checks her blood sugar with Dexcom CGM.  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  She has been noting improving BG's recently without hypos.  Current pump settings:  Basal: 1.55 u/hr  CR: 1:15  ISF: 23     ACE/ARB:no, Statin: no  Labs:  A1C:8.3 (5/2022)    The following portions of the patient's history were reviewed and updated as appropriate: allergies, current medications, past family history, past medical history, past social history, past surgical history and problem list.    Diet:   Breakfast: doesn't typically eat  Lunch: salad or left overs  Dinner: veggies, meat,   Drinks: diet mtn dew, milk  Snacks: popcorn    Past Medical History:   Diagnosis Date    Abnormal ECG     Arthritis     Asthma     Diabetes mellitus     GERD (gastroesophageal reflux disease)     IBS (irritable bowel syndrome)     Pancreatitis 2017    Scoliosis      Past Surgical History:   Procedure Laterality Date    CARDIOVASCULAR STRESS TEST  11/27/2018    L. Cardiolite- Unable to walk. EF 69%. Negative.    CHOLECYSTECTOMY      COLONOSCOPY      COLONOSCOPY N/A 9/21/2018    Procedure: COLONOSCOPY CPT CODE: 79408;  Surgeon: Micah Swartz MD;  Location: [facility];  Service: Gastroenterology    CYST REMOVAL      ECHO - CONVERTED  11/27/2018    EF 65%. No AS. Mild MR     "ENDOSCOPY      ENDOSCOPY N/A 9/21/2018    Procedure: ESOPHAGOGASTRODUODENOSCOPY WITH BIOPSY CPT CODE: 27687;  Surgeon: Micah Swartz MD;  Location: [facility];  Service: Gastroenterology    OTHER SURGICAL HISTORY  01/02/2019    Event monitor- baseline sinus, one 39 beat SVT, no V-tach, no AFib, no pauses    TUBAL ABDOMINAL LIGATION      UPPER GASTROINTESTINAL ENDOSCOPY  2015 2018      Family History   Problem Relation Age of Onset    Diabetes Other     Cancer Other     Lung cancer Other     Heart attack Father         MI in his late 40's    No Known Problems Sister       Social History     Socioeconomic History    Marital status: Single   Tobacco Use    Smoking status: Never    Smokeless tobacco: Never   Vaping Use    Vaping Use: Never used   Substance and Sexual Activity    Alcohol use: No    Drug use: No    Sexual activity: Defer      Allergies   Allergen Reactions    Ibuprofen Hives and Other (See Comments)     \"smiley\"    Latex Hives    Vaseline [Petrolatum] Hives      Current Outpatient Medications on File Prior to Visit   Medication Sig Dispense Refill    Continuous Blood Gluc  (Dexcom G6 ) device Use 1 each Take As Directed. 1 each 0    Continuous Blood Gluc Sensor (Dexcom G6 Sensor) USE AS DIRECTED PER PACKAGE INSTRUCTIONS. *CHANGE SENSOR EVERY 10 DAYS* 3 each 11    Continuous Blood Gluc Transmit (Dexcom G6 Transmitter) misc Change Every 3 (Three) Months. 1 each 3    gabapentin (NEURONTIN) 300 MG capsule Take 1 capsule by mouth 3 (Three) Times a Day As Needed (Pain).      glucose 5 g chewable tablet Chew 3 tablets As Needed for Low Blood Sugar. 50 tablet 12    glucose blood (FREESTYLE LITE) test strip 1 each by Other route 4 (Four) Times a Day. 250 each 2    HYDROcodone-acetaminophen (NORCO) 5-325 MG per tablet Take 1 tablet by mouth Every 8 (Eight) Hours As Needed. Only takes PRN      Insulin Disposable Pump (Omnipod 5 G6 Intro, Gen 5,) kit USE AS DIRECTED. CHANGE EACH POD EVERY 72 " HOURS. 1 kit 0    Insulin Disposable Pump (Omnipod 5 G6 Pod, Gen 5,) misc Use 1 each Every Other Day. 45 each 3    Lancets (freestyle) lancets USE TO TEST BLOOD GLUCOSE LEVELS FOUR TIMES A DAY AS DIRECTED. 100 each 3    Loratadine 10 MG capsule Take 1 capsule by mouth Every Night.      multivitamin (THERAGRAN) tablet tablet Take 1 tablet by mouth Daily.      omeprazole (priLOSEC) 20 MG capsule Take 2 capsules by mouth 2 (Two) Times a Day. 30 capsule 0    PARoxetine (PAXIL) 10 MG tablet Take 1 tablet by mouth Every Morning.      glucagon (GlucaGen HypoKit) 1 MG injection Inject 1 mg into the appropriate muscle as directed by prescriber As Needed for Low Blood Sugar (for severe hypoglycemia). 1 each 0    Insulin Aspart (novoLOG) 100 UNIT/ML injection Inject 10 Units under the skin into the appropriate area as directed 3 (Three) Times a Day Before Meals. Up to 15 units as needed for elevated BG 15 mL 2    Insulin Aspart (novoLOG) 100 UNIT/ML injection For use in insulin pump.   30 mL 2    insulin detemir (Levemir FlexPen) 100 UNIT/ML injection Inject 40 Units under the skin into the appropriate area as directed Every Night for 30 days. (Patient taking differently: Inject 50 Units under the skin into the appropriate area as directed Every Night.) 30 mL 0    Jardiance 25 MG tablet tablet TAKE ONE TABLET BY MOUTH ONCE DAILY. (Patient not taking: Reported on 10/17/2023) 30 tablet 6    lactobacillus acidophilus (RISAQUAD) capsule capsule Take 1 capsule by mouth Daily. (Patient not taking: Reported on 9/19/2023) 90 capsule 3    Semaglutide, 1 MG/DOSE, (Ozempic, 1 MG/DOSE,) 4 MG/3ML solution pen-injector Inject 1 mg under the skin into the appropriate area as directed 1 (One) Time Per Week. (Patient not taking: Reported on 10/17/2023) 3 mL 2     No current facility-administered medications on file prior to visit.        Review of Systems   Constitutional:  Positive for fatigue. Negative for unexpected weight gain and  "unexpected weight loss.   Eyes: Negative.    Endocrine: Negative for polydipsia, polyphagia and polyuria.   Psychiatric/Behavioral:  Positive for sleep disturbance.    All other systems reviewed and are negative.       /78 (BP Location: Left arm, Patient Position: Sitting, Cuff Size: Adult)   Pulse 76   Ht 167.6 cm (66\")   Wt 111 kg (244 lb)   LMP 05/09/2018 (Approximate)   SpO2 99%   BMI 39.38 kg/m²      Physical Exam  Vitals reviewed.   Constitutional:       Appearance: Normal appearance.   Eyes:      Extraocular Movements: Extraocular movements intact.   Cardiovascular:      Rate and Rhythm: Normal rate.   Pulmonary:      Effort: Pulmonary effort is normal.   Skin:     General: Skin is warm and dry.   Neurological:      General: No focal deficit present.      Mental Status: She is alert and oriented to person, place, and time.   Psychiatric:         Mood and Affect: Mood normal.         Behavior: Behavior normal.         Thought Content: Thought content normal.         Judgment: Judgment normal.       CMP:  Lab Results   Component Value Date     (C) 08/14/2023    BUN 27 (H) 10/11/2023    CREATININE 1.10 (H) 10/11/2023    EGFRIFNONA 77 04/27/2019    BCR 24.5 10/11/2023     10/11/2023    K 4.9 10/11/2023    CO2 23.0 10/11/2023    CALCIUM 9.4 10/11/2023    ALBUMIN 4.3 10/11/2023    BILITOT 0.2 10/11/2023    ALKPHOS 104 10/11/2023    AST 32 10/11/2023    ALT 25 10/11/2023     Lipid Panel:  No results found for: \"CHOL\", \"TRIG\", \"HDL\", \"VLDL\", \"LDL\"  HbA1c:  Lab Results   Component Value Date    HGBA1C 7.5 (H) 08/13/2023    HGBA1C 7.70 (H) 08/13/2023     Glucose:  Lab Results   Component Value Date    POCGLU 93 10/11/2023     Microalbumin:  Lab Results   Component Value Date    MALBCRERATIO  04/04/2023      Comment:      Unable to calculate     TSH:  Lab Results   Component Value Date    TSH 0.708 09/27/2023       Assessment and Plan    Diagnoses and all orders for this visit:    1. 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  -Discussed dietary and exercise guidelines with patient.  -Discussed the importance of yearly eye exams.  -Discussed the importance of checking BG's regularly. Continue using Dexcom CGM.    2 week pump data download is as follows:  Very High: 11%  High: 40%  In Range: 48%  Low: 1%  Very Low: 0%  Trend shows overall regulated BG's with post supper hypers and rebound hypos.  Likely due to not administering enough with supper and auto bolus correcting resulting in hypos.  -Adjustment to current pump settings:  Basal:   12A: 1.4 u/hr  3A: 1.55 u/hr  CR: 1:15  ISF: 23   Discussed importance of administering meal time boluses to prevent overt hypers with rebound hypos.  -S/S hypoglycemia reviewed with Rule of 15's advised.  -Follow-up at next scheduled appt.               Return if symptoms worsen or fail to improve. The patient was instructed to contact the clinic with any interval questions or concerns.        This document has been electronically signed by ALIE Gonzalez  October 17, 2023 15:19 EDT   Endocrinology

## 2023-10-20 RX ORDER — PROCHLORPERAZINE 25 MG/1
1 SUPPOSITORY RECTAL
Qty: 1 EACH | Refills: 3 | Status: SHIPPED | OUTPATIENT
Start: 2023-10-20

## 2023-10-20 RX ORDER — INSULIN PMP CART,AUT,G6/7,CNTR
1 EACH SUBCUTANEOUS EVERY OTHER DAY
Qty: 45 EACH | Refills: 3 | Status: SHIPPED | OUTPATIENT
Start: 2023-10-20

## 2023-10-20 RX ORDER — BLOOD-GLUCOSE METER
1 KIT MISCELLANEOUS 4 TIMES DAILY
Qty: 250 EACH | Refills: 2 | Status: SHIPPED | OUTPATIENT
Start: 2023-10-20

## 2023-10-20 RX ORDER — LANCETS 28 GAUGE
EACH MISCELLANEOUS
Qty: 100 EACH | Refills: 3 | Status: SHIPPED | OUTPATIENT
Start: 2023-10-20

## 2023-10-22 RX ORDER — INSULIN GLARGINE 100 [IU]/ML
INJECTION, SOLUTION SUBCUTANEOUS
Qty: 30 ML | Refills: 4 | Status: SHIPPED | OUTPATIENT
Start: 2023-10-22

## 2023-10-27 ENCOUNTER — TELEPHONE (OUTPATIENT)
Dept: ENDOCRINOLOGY | Facility: CLINIC | Age: 48
End: 2023-10-27
Payer: MEDICAID

## 2023-10-27 RX ORDER — INSULIN ASPART 100 [IU]/ML
10 INJECTION, SOLUTION INTRAVENOUS; SUBCUTANEOUS
Qty: 15 ML | Refills: 2 | Status: SHIPPED | OUTPATIENT
Start: 2023-10-27

## 2023-10-27 NOTE — TELEPHONE ENCOUNTER
Patient called to let us know that she is on her last Omnipod. She had 10 malfunction and has spoken with Omn[employer] and they are sending her replacements, but they won't be in before her last pod is out. She sts that 4 didn't have plastic piece, and 6 wouldn't sync with stephanie/dexcom. She was wanting to know if we had samples of Omnipod and Dexcom G6 in office. I told patient that we don't have samples in the office currently. She has already talked to her pharmacy and she is not elig for refill currently. Patient sts that if she has to take shots until she gets her omnipods that she is wanting us to send her a sliding scale.

## 2023-10-30 RX ORDER — PROCHLORPERAZINE 25 MG/1
1 SUPPOSITORY RECTAL TAKE AS DIRECTED
Qty: 1 EACH | Refills: 0 | Status: SHIPPED | OUTPATIENT
Start: 2023-10-30

## 2023-10-30 RX ORDER — LANCETS 28 GAUGE
EACH MISCELLANEOUS
Qty: 100 EACH | Refills: 3 | Status: SHIPPED | OUTPATIENT
Start: 2023-10-30

## 2023-10-31 ENCOUNTER — OFFICE VISIT (OUTPATIENT)
Dept: ENDOCRINOLOGY | Facility: CLINIC | Age: 48
End: 2023-10-31
Payer: MEDICAID

## 2023-10-31 VITALS
WEIGHT: 245.2 LBS | DIASTOLIC BLOOD PRESSURE: 90 MMHG | BODY MASS INDEX: 39.41 KG/M2 | HEART RATE: 104 BPM | SYSTOLIC BLOOD PRESSURE: 128 MMHG | OXYGEN SATURATION: 97 % | HEIGHT: 66 IN

## 2023-10-31 DIAGNOSIS — E13.9 LATENT AUTOIMMUNE DIABETES IN ADULTS (LADA), MANAGED AS TYPE 1: Primary | ICD-10-CM

## 2023-10-31 LAB
EXPIRATION DATE: ABNORMAL
GLUCOSE BLDC GLUCOMTR-MCNC: 157 MG/DL (ref 70–130)
HBA1C MFR BLD: 7.2 % (ref 4.5–5.7)
Lab: ABNORMAL

## 2023-10-31 PROCEDURE — 1160F RVW MEDS BY RX/DR IN RCRD: CPT | Performed by: NURSE PRACTITIONER

## 2023-10-31 PROCEDURE — 83036 HEMOGLOBIN GLYCOSYLATED A1C: CPT | Performed by: NURSE PRACTITIONER

## 2023-10-31 PROCEDURE — 95251 CONT GLUC MNTR ANALYSIS I&R: CPT | Performed by: NURSE PRACTITIONER

## 2023-10-31 PROCEDURE — 3051F HG A1C>EQUAL 7.0%<8.0%: CPT | Performed by: NURSE PRACTITIONER

## 2023-10-31 PROCEDURE — 1159F MED LIST DOCD IN RCRD: CPT | Performed by: NURSE PRACTITIONER

## 2023-10-31 PROCEDURE — 99214 OFFICE O/P EST MOD 30 MIN: CPT | Performed by: NURSE PRACTITIONER

## 2023-10-31 NOTE — PROGRESS NOTES
Chief Complaint   Patient presents with    Diabetes        Referring Provider  No ref. provider found     HPI   Marquita Vernon is a 48 y.o. female had concerns including Diabetes.    T2DM.    Patient has been doing well since her last visit.  She denies any changes,.    Diabetes:  Diabetes was diagnosed 2009.  Complications include neuropathy.  Last ophtho exam was Feb 2022, [facility].  Current medications for diabetes include Novolog in an Omnipod insulin pump.  Past meds: Metformin-GI issues, Januvia-insurance issue  She checks her blood sugar with Dexcom CGM.  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  She has been noting improving BG's recently without hypos.  Current pump settings:  Basal:   12A: 1.4 u/hr  3A: 1.5 u/hr  CR: 1:15  ISF: 23     ACE/ARB:no, Statin: no  Labs:  A1C:8.3 (5/2022)    The following portions of the patient's history were reviewed and updated as appropriate: allergies, current medications, past family history, past medical history, past social history, past surgical history and problem list.    Diet:   Breakfast: doesn't typically eat  Lunch: salad or left overs  Dinner: veggies, meat,   Drinks: diet mtn dew, milk  Snacks: popcorn    Past Medical History:   Diagnosis Date    Abnormal ECG     Arthritis     Asthma     Diabetes mellitus     GERD (gastroesophageal reflux disease)     IBS (irritable bowel syndrome)     Pancreatitis 2017    Scoliosis      Past Surgical History:   Procedure Laterality Date    CARDIOVASCULAR STRESS TEST  11/27/2018    L. Cardiolite- Unable to walk. EF 69%. Negative.    CHOLECYSTECTOMY      COLONOSCOPY      COLONOSCOPY N/A 9/21/2018    Procedure: COLONOSCOPY CPT CODE: 11454;  Surgeon: Micah Swartz MD;  Location: [facility];  Service: Gastroenterology    CYST REMOVAL      ECHO - CONVERTED  11/27/2018    EF 65%. No AS. Mild MR     "ENDOSCOPY      ENDOSCOPY N/A 9/21/2018    Procedure: ESOPHAGOGASTRODUODENOSCOPY WITH BIOPSY CPT CODE: 24298;  Surgeon: Micah Swartz MD;  Location: [facility];  Service: Gastroenterology    OTHER SURGICAL HISTORY  01/02/2019    Event monitor- baseline sinus, one 39 beat SVT, no V-tach, no AFib, no pauses    TUBAL ABDOMINAL LIGATION      UPPER GASTROINTESTINAL ENDOSCOPY  2015 2018      Family History   Problem Relation Age of Onset    Diabetes Other     Cancer Other     Lung cancer Other     Heart attack Father         MI in his late 40's    No Known Problems Sister       Social History     Socioeconomic History    Marital status: Single   Tobacco Use    Smoking status: Never    Smokeless tobacco: Never   Vaping Use    Vaping Use: Never used   Substance and Sexual Activity    Alcohol use: No    Drug use: No    Sexual activity: Defer      Allergies   Allergen Reactions    Ibuprofen Hives and Other (See Comments)     \"smiley\"    Latex Hives    Vaseline [Petrolatum] Hives      Current Outpatient Medications on File Prior to Visit   Medication Sig Dispense Refill    Continuous Blood Gluc  (Dexcom G6 ) device Use 1 each Take As Directed. 1 each 0    Continuous Blood Gluc Sensor (Dexcom G6 Sensor) USE AS DIRECTED PER PACKAGE INSTRUCTIONS. *CHANGE SENSOR EVERY 10 DAYS* 3 each 11    Continuous Blood Gluc Transmit (Dexcom G6 Transmitter) misc Change Every 3 (Three) Months. 1 each 3    gabapentin (NEURONTIN) 300 MG capsule Take 1 capsule by mouth 3 (Three) Times a Day As Needed (Pain).      glucagon (GlucaGen HypoKit) 1 MG injection Inject 1 mg into the appropriate muscle as directed by prescriber As Needed for Low Blood Sugar (for severe hypoglycemia). 1 each 0    glucose 5 g chewable tablet Chew 3 tablets As Needed for Low Blood Sugar. 50 tablet 12    glucose blood (FREESTYLE LITE) test strip 1 each by Other route 4 (Four) Times a Day. 250 each 2    HYDROcodone-acetaminophen (NORCO) 5-325 MG per " tablet Take 1 tablet by mouth Every 8 (Eight) Hours As Needed. Only takes PRN      Insulin Aspart (novoLOG) 100 UNIT/ML injection For use in insulin pump.   30 mL 2    Insulin Aspart (novoLOG) 100 UNIT/ML injection Inject 10 Units under the skin into the appropriate area as directed 3 (Three) Times a Day Before Meals. Up to 15 units as needed for elevated BG 15 mL 2    Insulin Disposable Pump (Omnipod 5 G6 Intro, Gen 5,) kit USE AS DIRECTED. CHANGE EACH POD EVERY 72 HOURS. 1 kit 0    Insulin Disposable Pump (Omnipod 5 G6 Pod, Gen 5,) misc Use 1 each Every Other Day. 45 each 3    Insulin Glargine Solostar 100 UNIT/ML solution pen-injector INJECT 75 UNITS SUBCUTANEOUSLY EVERY NIGHT 30 mL 4    Jardiance 25 MG tablet tablet TAKE ONE TABLET BY MOUTH ONCE DAILY. (Patient not taking: Reported on 10/17/2023) 30 tablet 6    lactobacillus acidophilus (RISAQUAD) capsule capsule Take 1 capsule by mouth Daily. (Patient not taking: Reported on 9/19/2023) 90 capsule 3    Lancets (freestyle) lancets Use as instructed 100 each 3    Loratadine 10 MG capsule Take 1 capsule by mouth Every Night.      multivitamin (THERAGRAN) tablet tablet Take 1 tablet by mouth Daily.      omeprazole (priLOSEC) 20 MG capsule Take 2 capsules by mouth 2 (Two) Times a Day. 30 capsule 0    PARoxetine (PAXIL) 10 MG tablet Take 1 tablet by mouth Every Morning.      Semaglutide, 1 MG/DOSE, (Ozempic, 1 MG/DOSE,) 4 MG/3ML solution pen-injector Inject 1 mg under the skin into the appropriate area as directed 1 (One) Time Per Week. (Patient not taking: Reported on 10/17/2023) 3 mL 2     No current facility-administered medications on file prior to visit.        Review of Systems   Constitutional:  Positive for fatigue. Negative for unexpected weight gain and unexpected weight loss.   Eyes: Negative.    Endocrine: Negative for polydipsia, polyphagia and polyuria.   Psychiatric/Behavioral:  Positive for sleep disturbance.    All other systems reviewed and  "are negative.    /90 (BP Location: Left arm, Patient Position: Sitting, Cuff Size: Adult)   Pulse 104   Ht 167.6 cm (66\")   Wt 111 kg (245 lb 3.2 oz)   LMP 05/09/2018 (Approximate)   SpO2 97%   BMI 39.58 kg/m²      Physical Exam  Vitals reviewed.   Constitutional:       Appearance: Normal appearance.   Eyes:      Extraocular Movements: Extraocular movements intact.   Cardiovascular:      Rate and Rhythm: Tachycardia present.   Pulmonary:      Effort: Pulmonary effort is normal.   Skin:     General: Skin is warm and dry.   Neurological:      General: No focal deficit present.      Mental Status: She is alert and oriented to person, place, and time.   Psychiatric:         Mood and Affect: Mood normal.         Behavior: Behavior normal.         Thought Content: Thought content normal.         Judgment: Judgment normal.       CMP:  Lab Results   Component Value Date     (C) 08/14/2023    BUN 27 (H) 10/11/2023    CREATININE 1.10 (H) 10/11/2023    EGFRIFNONA 77 04/27/2019    BCR 24.5 10/11/2023     10/11/2023    K 4.9 10/11/2023    CO2 23.0 10/11/2023    CALCIUM 9.4 10/11/2023    ALBUMIN 4.3 10/11/2023    BILITOT 0.2 10/11/2023    ALKPHOS 104 10/11/2023    AST 32 10/11/2023    ALT 25 10/11/2023     Lipid Panel:  No results found for: \"CHOL\", \"TRIG\", \"HDL\", \"VLDL\", \"LDL\"  HbA1c:  Lab Results   Component Value Date    HGBA1C 7.2 (A) 10/31/2023    HGBA1C 7.5 (H) 08/13/2023     Glucose:  Lab Results   Component Value Date    POCGLU 157 (A) 10/31/2023     Microalbumin:  Lab Results   Component Value Date    MALBCRERATIO  04/04/2023      Comment:      Unable to calculate     TSH:  Lab Results   Component Value Date    TSH 0.708 09/27/2023       Assessment and Plan    Diagnoses and all orders for this visit:    1. 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  -Discussed dietary and exercise guidelines " with patient.  -Discussed the importance of yearly eye exams.  -Discussed the importance of checking BG's regularly. Continue using Dexcom CGM.    2 week pump data download is as follows:  Very High: 20%  High: 28%  In Range: 50%  Low: 1%  Very Low: 1%  Trend shows overall regulated BG's with post supper hypers.  -Continue with current pump settings:  Basal:   12A: 1.4 u/hr  3A: 1.55 u/hr  CR: 1:15  ISF: 23   Discussed importance of administering meal time boluses to prevent overt hypers with rebound hypos.  -S/S hypoglycemia reviewed with Rule of 15's advised.  -Follow-up in 3 months.    Orders:  -     POC Glucose, Blood  -     POC Glycosylated Hemoglobin (Hb A1C)             Return in about 3 months (around 1/31/2024) for Follow-up appointment, A1C. The patient was instructed to contact the clinic with any interval questions or concerns.        This document has been electronically signed by ALIE Gonzalez  November 7, 2023 10:53 EST   Endocrinology

## 2023-11-03 ENCOUNTER — TRANSCRIBE ORDERS (OUTPATIENT)
Dept: ADMINISTRATIVE | Facility: HOSPITAL | Age: 48
End: 2023-11-03
Payer: MEDICAID

## 2023-11-03 DIAGNOSIS — Z13.820 SCREENING FOR OSTEOPOROSIS: Primary | ICD-10-CM

## 2023-11-06 ENCOUNTER — DOCUMENTATION (OUTPATIENT)
Dept: ENDOCRINOLOGY | Facility: CLINIC | Age: 48
End: 2023-11-06
Payer: MEDICAID

## 2023-11-07 ENCOUNTER — DOCUMENTATION (OUTPATIENT)
Dept: GASTROENTEROLOGY | Facility: CLINIC | Age: 48
End: 2023-11-07
Payer: MEDICAID

## 2023-11-07 DIAGNOSIS — Z12.11 ENCOUNTER FOR SCREENING FOR MALIGNANT NEOPLASM OF COLON: Primary | ICD-10-CM

## 2023-11-07 RX ORDER — POLYETHYLENE GLYCOL 3350 17 G/17G
510 POWDER, FOR SOLUTION ORAL ONCE
Qty: 510 G | Refills: 0 | Status: SHIPPED | OUTPATIENT
Start: 2023-11-07 | End: 2023-11-07

## 2023-11-07 RX ORDER — BISACODYL 5 MG/1
20 TABLET, DELAYED RELEASE ORAL ONCE
Qty: 4 TABLET | Refills: 0 | Status: SHIPPED | OUTPATIENT
Start: 2023-11-07 | End: 2023-11-07

## 2023-11-07 NOTE — ASSESSMENT & PLAN NOTE
-Diabetes is improving with A1c down from 7.5 to 7.2.  -Discussed dietary and exercise guidelines with patient.  -Discussed the importance of yearly eye exams.  -Discussed the importance of checking BG's regularly. Continue using Dexcom CGM.    2 week pump data download is as follows:  Very High: 20%  High: 28%  In Range: 50%  Low: 1%  Very Low: 1%  Trend shows overall regulated BG's with post supper hypers.  -Continue with current pump settings:  Basal:   12A: 1.4 u/hr  3A: 1.55 u/hr  CR: 1:15  ISF: 23   Discussed importance of administering meal time boluses to prevent overt hypers with rebound hypos.  -S/S hypoglycemia reviewed with Rule of 15's advised.  -Follow-up in 3 months.

## 2023-11-07 NOTE — PROGRESS NOTES
I had spoke to patient today as I had received a referral from her PCP's office stating she needed a screening colonoscopy. I scheduled her for 12-19-23. Patient did state that she had one several years ago and everything was ok. When I looked through her chart, she had one back in September of 2018 with Dr. Swartz. She had a EGD and Colonoscopy. His recommendations were to repeat Colonoscopy in 10 years. I called patient back and told her this information and she said she wanted to call her insurance company and find out if they will cover another one as it has only been 5 years. She stated she will call office back and let me know. I will keep her on the schedule for now.

## 2023-11-09 ENCOUNTER — HOSPITAL ENCOUNTER (OUTPATIENT)
Dept: BONE DENSITY | Facility: HOSPITAL | Age: 48
Discharge: HOME | End: 2023-11-09
Payer: MEDICAID

## 2023-11-09 DIAGNOSIS — Z13.820 SCREENING FOR OSTEOPOROSIS: ICD-10-CM

## 2023-11-09 PROCEDURE — 77080 DXA BONE DENSITY AXIAL: CPT

## 2023-11-10 NOTE — TELEPHONE ENCOUNTER
"Rx Refill Note    Requested Prescriptions     Pending Prescriptions Disp Refills    BD Insulin Syringe U/F 31G X 5/16\" 1 ML misc [Pharmacy Med Name: BD INS SYR SHORT 1CC 8418 31G X 5/16\" Miscellaneous] 100 each 0     Sig: USE TO INJECT INSULIN INTO INSULIN PUMP AS DIRECTED        Last office visit with prescribing clinician: 10/31/23    Next office visit with prescribing clinician: 11/20/2023  {    "

## 2023-11-13 RX ORDER — PEN NEEDLE, DIABETIC 29 G X1/2"
NEEDLE, DISPOSABLE MISCELLANEOUS
Qty: 100 EACH | Refills: 0 | Status: SHIPPED | OUTPATIENT
Start: 2023-11-13

## 2023-11-14 RX ORDER — INSULIN GLARGINE 100 [IU]/ML
INJECTION, SOLUTION SUBCUTANEOUS
Qty: 30 ML | Refills: 4 | Status: CANCELLED | OUTPATIENT
Start: 2023-11-14

## 2023-11-20 RX ORDER — GLUCAGON HYDROCHLORIDE 1 MG
1 KIT INJECTION AS NEEDED
Qty: 1 EACH | Refills: 0 | Status: SHIPPED | OUTPATIENT
Start: 2023-11-20

## 2023-11-21 RX ORDER — LANCETS 28 GAUGE
EACH MISCELLANEOUS
Qty: 100 EACH | Refills: 3 | Status: SHIPPED | OUTPATIENT
Start: 2023-11-21

## 2023-12-06 RX ORDER — SEMAGLUTIDE 1.34 MG/ML
INJECTION, SOLUTION SUBCUTANEOUS
Qty: 3 ML | Refills: 2 | Status: SHIPPED | OUTPATIENT
Start: 2023-12-06

## 2023-12-13 RX ORDER — GLUCAGON HYDROCHLORIDE 1 MG
1 KIT INJECTION AS NEEDED
Qty: 1 EACH | Refills: 0 | Status: SHIPPED | OUTPATIENT
Start: 2023-12-13

## 2023-12-13 RX ORDER — SEMAGLUTIDE 1.34 MG/ML
INJECTION, SOLUTION SUBCUTANEOUS
Qty: 3 ML | Refills: 2 | OUTPATIENT
Start: 2023-12-13

## 2023-12-24 ENCOUNTER — APPOINTMENT (OUTPATIENT)
Dept: CT IMAGING | Facility: HOSPITAL | Age: 48
End: 2023-12-24
Payer: MEDICAID

## 2023-12-24 ENCOUNTER — HOSPITAL ENCOUNTER (EMERGENCY)
Facility: HOSPITAL | Age: 48
Discharge: HOME | End: 2023-12-24
Attending: STUDENT IN AN ORGANIZED HEALTH CARE EDUCATION/TRAINING PROGRAM | Admitting: STUDENT IN AN ORGANIZED HEALTH CARE EDUCATION/TRAINING PROGRAM
Payer: MEDICAID

## 2023-12-24 VITALS
RESPIRATION RATE: 20 BRPM | BODY MASS INDEX: 39.37 KG/M2 | HEART RATE: 98 BPM | HEIGHT: 66 IN | TEMPERATURE: 97.4 F | OXYGEN SATURATION: 98 % | SYSTOLIC BLOOD PRESSURE: 166 MMHG | DIASTOLIC BLOOD PRESSURE: 94 MMHG | WEIGHT: 245 LBS

## 2023-12-24 DIAGNOSIS — S16.1XXA STRAIN OF NECK MUSCLE, INITIAL ENCOUNTER: ICD-10-CM
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
## 2023-12-24 DIAGNOSIS — E16.2 HYPOGLYCEMIA: Primary | ICD-10-CM

## 2023-12-24 LAB
ALBUMIN SERPL-MCNC: 3.7 G/DL (ref 3.5–5.2)
ALBUMIN/GLOB SERPL: 1.2 G/DL
ALP SERPL-CCNC: 92 U/L (ref 39–117)
ALT SERPL W P-5'-P-CCNC: 17 U/L (ref 1–33)
ANION GAP SERPL CALCULATED.3IONS-SCNC: 12.5 MMOL/L (ref 5–15)
AST SERPL-CCNC: 28 U/L (ref 1–32)
BASOPHILS # BLD AUTO: 0.07 10*3/MM3 (ref 0–0.2)
BASOPHILS NFR BLD AUTO: 0.8 % (ref 0–1.5)
BILIRUB SERPL-MCNC: 0.2 MG/DL (ref 0–1.2)
BUN SERPL-MCNC: 39 MG/DL (ref 6–20)
BUN/CREAT SERPL: 27.1 (ref 7–25)
CALCIUM SPEC-SCNC: 8.6 MG/DL (ref 8.6–10.5)
CHLORIDE SERPL-SCNC: 106 MMOL/L (ref 98–107)
CO2 SERPL-SCNC: 19.5 MMOL/L (ref 22–29)
CREAT SERPL-MCNC: 1.44 MG/DL (ref 0.57–1)
DEPRECATED RDW RBC AUTO: 48.4 FL (ref 37–54)
EGFRCR SERPLBLD CKD-EPI 2021: 45 ML/MIN/1.73
EOSINOPHIL # BLD AUTO: 0.55 10*3/MM3 (ref 0–0.4)
EOSINOPHIL NFR BLD AUTO: 6.2 % (ref 0.3–6.2)
ERYTHROCYTE [DISTWIDTH] IN BLOOD BY AUTOMATED COUNT: 14.5 % (ref 12.3–15.4)
ETHANOL BLD-MCNC: <10 MG/DL (ref 0–10)
ETHANOL UR QL: <0.01 %
GLOBULIN UR ELPH-MCNC: 3 GM/DL
GLUCOSE BLDC GLUCOMTR-MCNC: 255 MG/DL (ref 70–130)
GLUCOSE BLDC GLUCOMTR-MCNC: 40 MG/DL (ref 70–130)
GLUCOSE SERPL-MCNC: 197 MG/DL (ref 65–99)
HCT VFR BLD AUTO: 35.9 % (ref 34–46.6)
HGB BLD-MCNC: 11.1 G/DL (ref 12–15.9)
IMM GRANULOCYTES # BLD AUTO: 0.05 10*3/MM3 (ref 0–0.05)
IMM GRANULOCYTES NFR BLD AUTO: 0.6 % (ref 0–0.5)
INR PPP: 0.9 (ref 0.9–1.1)
LYMPHOCYTES # BLD AUTO: 2.54 10*3/MM3 (ref 0.7–3.1)
LYMPHOCYTES NFR BLD AUTO: 28.6 % (ref 19.6–45.3)
MCH RBC QN AUTO: 28 PG (ref 26.6–33)
MCHC RBC AUTO-ENTMCNC: 30.9 G/DL (ref 31.5–35.7)
MCV RBC AUTO: 90.4 FL (ref 79–97)
MONOCYTES # BLD AUTO: 0.64 10*3/MM3 (ref 0.1–0.9)
MONOCYTES NFR BLD AUTO: 7.2 % (ref 5–12)
NEUTROPHILS NFR BLD AUTO: 5.04 10*3/MM3 (ref 1.7–7)
NEUTROPHILS NFR BLD AUTO: 56.6 % (ref 42.7–76)
NRBC BLD AUTO-RTO: 0 /100 WBC (ref 0–0.2)
PLATELET # BLD AUTO: 196 10*3/MM3 (ref 140–450)
PMV BLD AUTO: 12.1 FL (ref 6–12)
POTASSIUM SERPL-SCNC: 4.5 MMOL/L (ref 3.5–5.2)
PROT SERPL-MCNC: 6.7 G/DL (ref 6–8.5)
PROTHROMBIN TIME: 12.6 SECONDS (ref 12.1–14.7)
QT INTERVAL: 376 MS
QTC INTERVAL: 475 MS
RBC # BLD AUTO: 3.97 10*6/MM3 (ref 3.77–5.28)
SODIUM SERPL-SCNC: 138 MMOL/L (ref 136–145)
TROPONIN T SERPL HS-MCNC: 19 NG/L
WBC NRBC COR # BLD AUTO: 8.89 10*3/MM3 (ref 3.4–10.8)

## 2023-12-24 PROCEDURE — 96375 TX/PRO/DX INJ NEW DRUG ADDON: CPT

## 2023-12-24 PROCEDURE — 84484 ASSAY OF TROPONIN QUANT: CPT | Performed by: STUDENT IN AN ORGANIZED HEALTH CARE EDUCATION/TRAINING PROGRAM

## 2023-12-24 PROCEDURE — 71275 CT ANGIOGRAPHY CHEST: CPT

## 2023-12-24 PROCEDURE — 72125 CT NECK SPINE W/O DYE: CPT

## 2023-12-24 PROCEDURE — 25510000001 IOPAMIDOL PER 1 ML: Performed by: STUDENT IN AN ORGANIZED HEALTH CARE EDUCATION/TRAINING PROGRAM

## 2023-12-24 PROCEDURE — 80053 COMPREHEN METABOLIC PANEL: CPT | Performed by: PHYSICIAN ASSISTANT

## 2023-12-24 PROCEDURE — 25010000002 GLUCAGON HCL (DIAGNOSTIC) 1 MG RECONSTITUTED SOLUTION: Performed by: PHYSICIAN ASSISTANT

## 2023-12-24 PROCEDURE — 74177 CT ABD & PELVIS W/CONTRAST: CPT

## 2023-12-24 PROCEDURE — 93005 ELECTROCARDIOGRAM TRACING: CPT | Performed by: STUDENT IN AN ORGANIZED HEALTH CARE EDUCATION/TRAINING PROGRAM

## 2023-12-24 PROCEDURE — 72128 CT CHEST SPINE W/O DYE: CPT

## 2023-12-24 PROCEDURE — 82077 ASSAY SPEC XCP UR&BREATH IA: CPT | Performed by: STUDENT IN AN ORGANIZED HEALTH CARE EDUCATION/TRAINING PROGRAM

## 2023-12-24 PROCEDURE — 96374 THER/PROPH/DIAG INJ IV PUSH: CPT

## 2023-12-24 PROCEDURE — 85610 PROTHROMBIN TIME: CPT | Performed by: STUDENT IN AN ORGANIZED HEALTH CARE EDUCATION/TRAINING PROGRAM

## 2023-12-24 PROCEDURE — 25010000002 ORPHENADRINE CITRATE PER 60 MG: Performed by: PHYSICIAN ASSISTANT

## 2023-12-24 PROCEDURE — 99285 EMERGENCY DEPT VISIT HI MDM: CPT

## 2023-12-24 PROCEDURE — 82948 REAGENT STRIP/BLOOD GLUCOSE: CPT

## 2023-12-24 PROCEDURE — 36415 COLL VENOUS BLD VENIPUNCTURE: CPT

## 2023-12-24 PROCEDURE — 96372 THER/PROPH/DIAG INJ SC/IM: CPT

## 2023-12-24 PROCEDURE — 70450 CT HEAD/BRAIN W/O DYE: CPT

## 2023-12-24 PROCEDURE — 85025 COMPLETE CBC W/AUTO DIFF WBC: CPT | Performed by: PHYSICIAN ASSISTANT

## 2023-12-24 PROCEDURE — 72131 CT LUMBAR SPINE W/O DYE: CPT

## 2023-12-24 RX ORDER — ACETAMINOPHEN 500 MG
1000 TABLET ORAL ONCE
Status: COMPLETED | OUTPATIENT
Start: 2023-12-24 | End: 2023-12-24

## 2023-12-24 RX ORDER — GLUCAGON 1 MG/ML
1 KIT INJECTION ONCE
Status: COMPLETED | OUTPATIENT
Start: 2023-12-24 | End: 2023-12-24

## 2023-12-24 RX ORDER — DEXTROSE MONOHYDRATE 25 G/50ML
25 INJECTION, SOLUTION INTRAVENOUS ONCE
Status: COMPLETED | OUTPATIENT
Start: 2023-12-24 | End: 2023-12-24

## 2023-12-24 RX ORDER — SODIUM CHLORIDE 0.9 % (FLUSH) 0.9 %
10 SYRINGE (ML) INJECTION AS NEEDED
Status: DISCONTINUED | OUTPATIENT
Start: 2023-12-24 | End: 2023-12-24 | Stop reason: HOSPADM

## 2023-12-24 RX ORDER — ORPHENADRINE CITRATE 30 MG/ML
60 INJECTION INTRAMUSCULAR; INTRAVENOUS ONCE
Status: COMPLETED | OUTPATIENT
Start: 2023-12-24 | End: 2023-12-24

## 2023-12-24 RX ORDER — DEXTROSE MONOHYDRATE 25 G/50ML
INJECTION, SOLUTION INTRAVENOUS
Status: COMPLETED
Start: 2023-12-24 | End: 2023-12-24

## 2023-12-24 RX ADMIN — DEXTROSE MONOHYDRATE 25 G: 25 INJECTION, SOLUTION INTRAVENOUS at 15:24

## 2023-12-24 RX ADMIN — ACETAMINOPHEN 1000 MG: 500 TABLET ORAL at 19:20

## 2023-12-24 RX ADMIN — GLUCAGON 1 MG: 1 INJECTION, POWDER, LYOPHILIZED, FOR SOLUTION INTRAMUSCULAR; INTRAVENOUS at 15:33

## 2023-12-24 RX ADMIN — DEXTROSE MONOHYDRATE 25 G: 25 INJECTION, SOLUTION INTRAVENOUS at 15:33

## 2023-12-24 RX ADMIN — ORPHENADRINE CITRATE 60 MG: 60 INJECTION INTRAMUSCULAR; INTRAVENOUS at 19:20

## 2023-12-24 RX ADMIN — IOPAMIDOL 70 ML: 755 INJECTION, SOLUTION INTRAVENOUS at 16:47

## 2023-12-24 NOTE — ED NOTES
Called pts sister per request at this time. Pt provided contact information for pts sister. Pts sister updated on pt condition and verbalizes understanding.     Pts sisters contact information:    Hoda Hernandez: [phone]
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
## 2023-12-24 NOTE — ED NOTES
Pts sister contacted and informed that pt is getting discharged and would need transportation home. Pts sister stated that she would be here in about 45 minutes to tranport pt home due [place]. Assigned RN made aware.

## 2023-12-27 ENCOUNTER — OFFICE VISIT (OUTPATIENT)
Dept: ENDOCRINOLOGY | Facility: CLINIC | Age: 48
End: 2023-12-27
Payer: MEDICAID

## 2023-12-27 VITALS
HEART RATE: 94 BPM | OXYGEN SATURATION: 97 % | HEIGHT: 66 IN | DIASTOLIC BLOOD PRESSURE: 94 MMHG | BODY MASS INDEX: 43.87 KG/M2 | SYSTOLIC BLOOD PRESSURE: 148 MMHG | WEIGHT: 273 LBS

## 2023-12-27 DIAGNOSIS — E13.9 LATENT AUTOIMMUNE DIABETES IN ADULTS (LADA), MANAGED AS TYPE 1: Primary | ICD-10-CM

## 2023-12-27 NOTE — ASSESSMENT & PLAN NOTE
After reviewing her insulin pump download, it shows were patient administered her meal time bolus and then the pump auto bolused as her BG increased, which is what resulted in her significant hypo.  Reviewed settings and adjusted as below:  Basal:   12A: 1.4 u/hr  3A: 1.5 u/hr  CR: 1:15  ISF: 50  She will contact the office in 2 days to determine if further adjustments are needed.  Follow-up at her next scheduled visit.
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
## 2024-01-22 RX ORDER — INSULIN PMP CART,AUT,G6/7,CNTR
1 EACH SUBCUTANEOUS EVERY OTHER DAY
Qty: 45 EACH | Refills: 3 | Status: SHIPPED | OUTPATIENT
Start: 2024-01-22

## 2024-01-29 ENCOUNTER — TELEPHONE (OUTPATIENT)
Dept: ENDOCRINOLOGY | Facility: CLINIC | Age: 49
End: 2024-01-29
Payer: MEDICAID

## 2024-01-29 NOTE — TELEPHONE ENCOUNTER
Patient called to check and see if she needs to reschedule her colonoscopy because her sugar is 350. Her Omnipod is about to run out. Went over with provider and provider suggest patient take a corrective dose, drink lots of water, and move around. She shouldn't have to cancel unless her sugar gets way higher. Patient aware and will call us back if corrective dose doesn't work.

## 2024-01-30 ENCOUNTER — ANESTHESIA (OUTPATIENT)
Dept: PERIOP | Facility: HOSPITAL | Age: 49
End: 2024-01-30
Payer: MEDICAID

## 2024-01-30 ENCOUNTER — ANESTHESIA EVENT (OUTPATIENT)
Dept: PERIOP | Facility: HOSPITAL | Age: 49
End: 2024-01-30
Payer: MEDICAID

## 2024-01-30 ENCOUNTER — HOSPITAL ENCOUNTER (OUTPATIENT)
Facility: HOSPITAL | Age: 49
Setting detail: HOSPITAL OUTPATIENT SURGERY
Discharge: HOME | End: 2024-01-30
Attending: INTERNAL MEDICINE | Admitting: INTERNAL MEDICINE
Payer: MEDICAID

## 2024-01-30 VITALS
DIASTOLIC BLOOD PRESSURE: 62 MMHG | WEIGHT: 248 LBS | OXYGEN SATURATION: 97 % | RESPIRATION RATE: 18 BRPM | HEART RATE: 81 BPM | BODY MASS INDEX: 39.86 KG/M2 | TEMPERATURE: 97.9 F | HEIGHT: 66 IN | SYSTOLIC BLOOD PRESSURE: 108 MMHG

## 2024-01-30 DIAGNOSIS — Z12.11 ENCOUNTER FOR SCREENING FOR MALIGNANT NEOPLASM OF COLON: ICD-10-CM

## 2024-01-30 LAB — GLUCOSE BLDC GLUCOMTR-MCNC: 188 MG/DL (ref 70–130)

## 2024-01-30 PROCEDURE — 45378 DIAGNOSTIC COLONOSCOPY: CPT | Performed by: INTERNAL MEDICINE

## 2024-01-30 PROCEDURE — 25010000002 PROPOFOL 200 MG/20ML EMULSION: Performed by: NURSE ANESTHETIST, CERTIFIED REGISTERED

## 2024-01-30 PROCEDURE — 25810000003 LACTATED RINGERS PER 1000 ML: Performed by: ANESTHESIOLOGY

## 2024-01-30 RX ORDER — SODIUM CHLORIDE, SODIUM LACTATE, POTASSIUM CHLORIDE, CALCIUM CHLORIDE 600; 310; 30; 20 MG/100ML; MG/100ML; MG/100ML; MG/100ML
125 INJECTION, SOLUTION INTRAVENOUS ONCE
Status: DISCONTINUED | OUTPATIENT
Start: 2024-01-30 | End: 2024-01-30 | Stop reason: HOSPADM

## 2024-01-30 RX ORDER — SODIUM CHLORIDE, SODIUM LACTATE, POTASSIUM CHLORIDE, CALCIUM CHLORIDE 600; 310; 30; 20 MG/100ML; MG/100ML; MG/100ML; MG/100ML
125 INJECTION, SOLUTION INTRAVENOUS ONCE
Status: COMPLETED | OUTPATIENT
Start: 2024-01-30 | End: 2024-01-30

## 2024-01-30 RX ORDER — LIDOCAINE HYDROCHLORIDE 20 MG/ML
INJECTION, SOLUTION EPIDURAL; INFILTRATION; INTRACAUDAL; PERINEURAL AS NEEDED
Status: DISCONTINUED | OUTPATIENT
Start: 2024-01-30 | End: 2024-01-30 | Stop reason: SURG

## 2024-01-30 RX ORDER — SODIUM CHLORIDE 9 MG/ML
40 INJECTION, SOLUTION INTRAVENOUS AS NEEDED
Status: DISCONTINUED | OUTPATIENT
Start: 2024-01-30 | End: 2024-01-30 | Stop reason: HOSPADM

## 2024-01-30 RX ORDER — SODIUM CHLORIDE 0.9 % (FLUSH) 0.9 %
10 SYRINGE (ML) INJECTION AS NEEDED
Status: DISCONTINUED | OUTPATIENT
Start: 2024-01-30 | End: 2024-01-30 | Stop reason: HOSPADM

## 2024-01-30 RX ORDER — OXYCODONE HYDROCHLORIDE AND ACETAMINOPHEN 5; 325 MG/1; MG/1
1 TABLET ORAL ONCE AS NEEDED
Status: DISCONTINUED | OUTPATIENT
Start: 2024-01-30 | End: 2024-01-30 | Stop reason: HOSPADM

## 2024-01-30 RX ORDER — FENTANYL CITRATE 50 UG/ML
50 INJECTION, SOLUTION INTRAMUSCULAR; INTRAVENOUS
Status: DISCONTINUED | OUTPATIENT
Start: 2024-01-30 | End: 2024-01-30 | Stop reason: HOSPADM

## 2024-01-30 RX ORDER — PROPOFOL 10 MG/ML
INJECTION, EMULSION INTRAVENOUS CONTINUOUS PRN
Status: DISCONTINUED | OUTPATIENT
Start: 2024-01-30 | End: 2024-01-30 | Stop reason: SURG

## 2024-01-30 RX ORDER — SODIUM CHLORIDE 0.9 % (FLUSH) 0.9 %
10 SYRINGE (ML) INJECTION EVERY 12 HOURS SCHEDULED
Status: DISCONTINUED | OUTPATIENT
Start: 2024-01-30 | End: 2024-01-30 | Stop reason: HOSPADM

## 2024-01-30 RX ORDER — SODIUM CHLORIDE, SODIUM LACTATE, POTASSIUM CHLORIDE, CALCIUM CHLORIDE 600; 310; 30; 20 MG/100ML; MG/100ML; MG/100ML; MG/100ML
100 INJECTION, SOLUTION INTRAVENOUS ONCE AS NEEDED
Status: DISCONTINUED | OUTPATIENT
Start: 2024-01-30 | End: 2024-01-30 | Stop reason: HOSPADM

## 2024-01-30 RX ORDER — IPRATROPIUM BROMIDE AND ALBUTEROL SULFATE 2.5; .5 MG/3ML; MG/3ML
3 SOLUTION RESPIRATORY (INHALATION) ONCE AS NEEDED
Status: DISCONTINUED | OUTPATIENT
Start: 2024-01-30 | End: 2024-01-30 | Stop reason: HOSPADM

## 2024-01-30 RX ORDER — MIDAZOLAM HYDROCHLORIDE 1 MG/ML
1 INJECTION INTRAMUSCULAR; INTRAVENOUS
Status: DISCONTINUED | OUTPATIENT
Start: 2024-01-30 | End: 2024-01-30 | Stop reason: HOSPADM

## 2024-01-30 RX ORDER — ONDANSETRON 2 MG/ML
4 INJECTION INTRAMUSCULAR; INTRAVENOUS AS NEEDED
Status: DISCONTINUED | OUTPATIENT
Start: 2024-01-30 | End: 2024-01-30 | Stop reason: HOSPADM

## 2024-01-30 RX ORDER — MEPERIDINE HYDROCHLORIDE 25 MG/ML
12.5 INJECTION INTRAMUSCULAR; INTRAVENOUS; SUBCUTANEOUS
Status: DISCONTINUED | OUTPATIENT
Start: 2024-01-30 | End: 2024-01-30 | Stop reason: HOSPADM

## 2024-01-30 RX ORDER — PHENYLEPHRINE HCL IN 0.9% NACL 1 MG/10 ML
SYRINGE (ML) INTRAVENOUS AS NEEDED
Status: DISCONTINUED | OUTPATIENT
Start: 2024-01-30 | End: 2024-01-30 | Stop reason: SURG

## 2024-01-30 RX ADMIN — Medication 100 MCG: at 08:17

## 2024-01-30 RX ADMIN — PROPOFOL 200 MCG/KG/MIN: 10 INJECTION, EMULSION INTRAVENOUS at 08:14

## 2024-01-30 RX ADMIN — Medication 100 MCG: at 08:19

## 2024-01-30 RX ADMIN — Medication 100 MCG: at 08:27

## 2024-01-30 RX ADMIN — Medication 100 MCG: at 08:28

## 2024-01-30 RX ADMIN — LIDOCAINE HYDROCHLORIDE 60 MG: 20 INJECTION, SOLUTION EPIDURAL; INFILTRATION; INTRACAUDAL; PERINEURAL at 08:14

## 2024-01-30 RX ADMIN — Medication 100 MCG: at 08:18

## 2024-01-30 RX ADMIN — SODIUM CHLORIDE, POTASSIUM CHLORIDE, SODIUM LACTATE AND CALCIUM CHLORIDE: 600; 310; 30; 20 INJECTION, SOLUTION INTRAVENOUS at 08:14

## 2024-01-30 NOTE — ANESTHESIA PREPROCEDURE EVALUATION
Anesthesia Evaluation     no history of anesthetic complications:   NPO Solid Status: > 8 hours  NPO Liquid Status: > 8 hours           Airway   Mallampati: II  TM distance: >3 FB  Neck ROM: full  No difficulty expected  Dental    (+) edentulous    Pulmonary - normal exam   (+) asthma,shortness of breath  Cardiovascular - normal exam        Neuro/Psych  GI/Hepatic/Renal/Endo    (+) obesity, GERD, diabetes mellitus    Musculoskeletal     Abdominal  - normal exam   Substance History      OB/GYN          Other                          Anesthesia Plan    ASA 3     general     intravenous induction     Anesthetic plan, risks, benefits, and alternatives have been provided, discussed and informed consent has been obtained with: patient.

## 2024-01-30 NOTE — ANESTHESIA POSTPROCEDURE EVALUATION
Patient: Marquita Vernon    Procedure Summary       Date: 01/30/24 Room / Location: [facility] OR [address] [place] [address] COR OR    Anesthesia Start: 0813 Anesthesia Stop: 0838    Procedure: COLONOSCOPY FOR SCREENING Diagnosis:       Encounter for screening for malignant neoplasm of colon      (Encounter for screening for malignant neoplasm of colon [Z12.11])    Surgeons: Rosita Boyd MD Provider: Clayton Sung MD    Anesthesia Type: general ASA Status: 3            Anesthesia Type: general    Vitals  Vitals Value Taken Time   /62 01/30/24 0907   Temp 97.9 °F (36.6 °C) 01/30/24 0840   Pulse 81 01/30/24 0907   Resp 18 01/30/24 0907   SpO2 97 % 01/30/24 0907           Post Anesthesia Care and Evaluation    Patient location during evaluation: bedside  Patient participation: complete - patient participated  Level of consciousness: awake and alert  Pain score: 1  Pain management: adequate    Airway patency: patent  Anesthetic complications: No anesthetic complications  PONV Status: none  Cardiovascular status: acceptable  Respiratory status: acceptable  Hydration status: acceptable

## 2024-01-30 NOTE — H&P
[facility]    Patient Identification:  Name:  Marquita Vernon  Age:  48 y.o.  Sex:  female  :  1975  MRN:  [id number]   Visit Number:  [id number]  Primary Care Physician:  Lan Guerra APRN       Chief complaint: Alternating diarrhea and constipation    History of presenting illness:  48 y.o. female presents today with complaint of alternating diarrhea and constipation.  The patient states that she can go a day without a bowel movement.  At times her stool is hard and at other times the stool is loose.  She denies associated abdominal pain.  She has not had issues with bright red blood per rectum or melena.  She has not had associated weight loss.  There is no associated nausea or vomiting.  She does experience bloating at times.  Her symptoms have been present for 3 to 4 years.    ---------------------------------------------------------------------------------------------------------------------   Review of Systems   Constitutional:  Negative for activity change, appetite change, chills, fatigue, fever and unexpected weight change.   HENT:  Negative for mouth sores and trouble swallowing.    Eyes:  Negative for photophobia, pain and redness.   Respiratory:  Negative for cough and choking.    Cardiovascular:  Negative for chest pain and leg swelling.   Gastrointestinal:  Positive for abdominal distention, constipation and diarrhea. Negative for abdominal pain, anal bleeding, nausea, rectal pain and vomiting.   Endocrine: Negative for cold intolerance, heat intolerance and polyphagia.   Genitourinary:  Negative for difficulty urinating and dysuria.   Musculoskeletal:  Negative for arthralgias, joint swelling and myalgias.   Skin:  Negative for rash and wound.   Allergic/Immunologic: Negative for environmental allergies and food allergies.   Neurological:  Negative for dizziness and light-headedness.   Hematological:  Negative for adenopathy. Does not  bruise/bleed easily.   Psychiatric/Behavioral:  Negative for sleep disturbance. The patient is not nervous/anxious.       ---------------------------------------------------------------------------------------------------------------------   Past Medical History:   Diagnosis Date    Abnormal ECG     Arthritis     Asthma     Diabetes mellitus     GERD (gastroesophageal reflux disease)     IBS (irritable bowel syndrome)     Pancreatitis 2017    Scoliosis      Past Surgical History:   Procedure Laterality Date    CARDIOVASCULAR STRESS TEST  11/27/2018    L. Cardiolite- Unable to walk. EF 69%. Negative.    CHOLECYSTECTOMY      COLONOSCOPY      COLONOSCOPY N/A 9/21/2018    Procedure: COLONOSCOPY CPT CODE: 25940;  Surgeon: Micah Swartz MD;  Location: [facility];  Service: Gastroenterology    CYST REMOVAL      ECHO - CONVERTED  11/27/2018    EF 65%. No AS. Mild MR    ENDOSCOPY      ENDOSCOPY N/A 9/21/2018    Procedure: ESOPHAGOGASTRODUODENOSCOPY WITH BIOPSY CPT CODE: 23526;  Surgeon: Micah Swartz MD;  Location: [facility];  Service: Gastroenterology    OTHER SURGICAL HISTORY  01/02/2019    Event monitor- baseline sinus, one 39 beat SVT, no V-tach, no AFib, no pauses    TUBAL ABDOMINAL LIGATION      UPPER GASTROINTESTINAL ENDOSCOPY  2015 2018     Family History   Problem Relation Age of Onset    Diabetes Other     Cancer Other     Lung cancer Other     Heart attack Father         MI in his late 40's    No Known Problems Sister      Social History     Socioeconomic History    Marital status: Single   Tobacco Use    Smoking status: Never    Smokeless tobacco: Never   Vaping Use    Vaping Use: Never used   Substance and Sexual Activity    Alcohol use: No    Drug use: No    Sexual activity: Defer     ---------------------------------------------------------------------------------------------------------------------   Allergies:  Ibuprofen, Latex, and Vaseline  [petrolatum]  ---------------------------------------------------------------------------------------------------------------------   Prior to Admission Medications       Prescriptions Last Dose Informant Patient Reported? Taking?    Continuous Blood Gluc  (Dexcom G6 ) device 1/30/2024  No Yes    Use 1 each Take As Directed.    gabapentin (NEURONTIN) 300 MG capsule 1/29/2024  Yes Yes    Take 1 capsule by mouth 3 (Three) Times a Day As Needed (Pain).    glucagon (GlucaGen HypoKit) 1 MG injection Past Month  No Yes    Inject 1 mg into the appropriate muscle as directed by prescriber As Needed for Low Blood Sugar (for severe hypoglycemia).    Insulin Disposable Pump (Omnipod 5 G6 Intro, Gen 5,) kit 1/29/2024  No Yes    USE AS DIRECTED. CHANGE EACH POD EVERY 72 HOURS.    Jardiance 25 MG tablet tablet 1/29/2024  No Yes    TAKE ONE TABLET BY MOUTH ONCE DAILY.    lactobacillus acidophilus (RISAQUAD) capsule capsule 1/29/2024 Self No Yes    Take 1 capsule by mouth Daily.    Loratadine 10 MG capsule 1/29/2024 Self Yes Yes    Take 1 capsule by mouth Every Night.    multivitamin (THERAGRAN) tablet tablet 1/29/2024 Self Yes Yes    Take 1 tablet by mouth Daily.    omeprazole (priLOSEC) 20 MG capsule 1/29/2024  No Yes    Take 2 capsules by mouth 2 (Two) Times a Day.    PARoxetine (PAXIL) 10 MG tablet 1/29/2024 Self Yes Yes    Take 1 tablet by mouth Every Morning.    Continuous Blood Gluc Sensor (Dexcom G6 Sensor)   No No    USE AS DIRECTED PER PACKAGE INSTRUCTIONS. *CHANGE SENSOR EVERY 10 DAYS*    Continuous Blood Gluc Transmit (Dexcom G6 Transmitter) misc   No No    Change Every 3 (Three) Months.    glucose 5 g chewable tablet   No No    Chew 3 tablets As Needed for Low Blood Sugar.    glucose blood (FREESTYLE LITE) test strip   No No    1 each by Other route 4 (Four) Times a Day.    HYDROcodone-acetaminophen (NORCO) 5-325 MG per tablet  Self Yes No    Take 1 tablet by mouth Every 8 (Eight) Hours As Needed. Only  takes PRN    Insulin Disposable Pump (Omnipod 5 G6 Pod, Gen 5,) misc   No No    Use 1 each Every Other Day.    Lancets (freestyle) lancets   No No    USE 1 LANCET TO CHECK BLOOD GLUCOSE LEVELS FOUR TIMES DAILY AS DIRECTED          Hospital Scheduled Meds:  lactated ringers, 125 mL/hr, Intravenous, Once  lactated ringers, 125 mL/hr, Intravenous, Once  sodium chloride, 10 mL, Intravenous, Q12H  sodium chloride, 10 mL, Intravenous, Q12H         ---------------------------------------------------------------------------------------------------------------------   Vital Signs:  Temp:  [97.8 °F (36.6 °C)] 97.8 °F (36.6 °C)  Heart Rate:  [90] 90  Resp:  [18] 18  BP: (97)/(70) 97/70      01/30/24  0709   Weight: 112 kg (248 lb)     Body mass index is 40.03 kg/m².  ---------------------------------------------------------------------------------------------------------------------   Physical Exam:  Constitutional:  Well-developed and well-nourished.  No respiratory distress.      HENT:  Head: Normocephalic and atraumatic.  Mouth:  Moist mucous membranes.    Eyes:  Conjunctivae and EOM are normal.  Pupils are equal, round, and reactive to light.  No scleral icterus.  Neck:  Neck supple.  No JVD present.    Cardiovascular:  Normal rate, regular rhythm and normal heart sounds with no murmur.  Pulmonary/Chest:  No respiratory distress, no wheezes, no crackles, with normal breath sounds and good air movement.  Abdominal:  Soft.  Bowel sounds are normal.  No distension and no tenderness.   Musculoskeletal:  No edema, no tenderness, and no deformity.  No red or swollen joints anywhere.    Neurological:  Alert and oriented to person, place, and time.  No cranial nerve deficit.  No tongue deviation.  No facial droop.  No slurred speech.   Skin:  Skin is warm and dry.  No rash noted.  No pallor.   Psychiatric:  Normal mood and affect.  Behavior is normal.  Judgment and thought content normal.   Peripheral vascular:  No edema and  "strong pulses on all 4 extremities.  Genitourinary:  ---------------------------------------------------------------------------------------------------------------------            Invalid input(s): \"PROT\"CrCl cannot be calculated (Patient's most recent lab result is older than the maximum 30 days allowed.).  No results found for: \"AMMONIA\"          Lab Results   Component Value Date    HGBA1C 7.2 (A) 10/31/2023     Lab Results   Component Value Date    TSH 0.708 09/27/2023    FREET4 0.98 09/27/2023     Lab Results   Component Value Date    PREGTESTUR Negative 08/09/2018     Pain Management Panel  More data exists         Latest Ref Rng & Units 6/19/2023 4/4/2023   Pain Management Panel   Creatinine, Urine mg/dL  mg/dL - 31.6  31.6    Amphetamine, Urine Qual Negative Negative  -   Barbiturates Screen, Urine Negative Negative  -   Benzodiazepine Screen, Urine Negative Negative  -   Buprenorphine, Screen, Urine Negative Negative  -   Cocaine Screen, Urine Negative Negative  -   Methadone Screen , Urine Negative Negative  -   Methamphetamine, Ur Negative Negative  -     No results found for: \"BLOODCX\"  No results found for: \"URINECX\"  No results found for: \"WOUNDCX\"  No results found for: \"STOOLCX\"      ---------------------------------------------------------------------------------------------------------------------  Imaging Results (Last 7 Days)       ** No results found for the last 168 hours. **            I have personally reviewed the radiology images and read the final radiology report.  ---------------------------------------------------------------------------------------------------------------------  Assessment and Plan:  Proceed with colonoscopy secondary to alternating diarrhea and constipation.    Rosita Boyd MD  01/30/24  07:38 EST  "

## 2024-02-02 RX ORDER — BLOOD-GLUCOSE METER
1 KIT MISCELLANEOUS 4 TIMES DAILY
Qty: 250 EACH | Refills: 2 | Status: SHIPPED | OUTPATIENT
Start: 2024-02-02

## 2024-02-02 RX ORDER — INSULIN ASPART 100 [IU]/ML
INJECTION, SOLUTION INTRAVENOUS; SUBCUTANEOUS
Qty: 30 ML | Refills: 3 | Status: SHIPPED | OUTPATIENT
Start: 2024-02-02

## 2024-02-06 RX ORDER — INSULIN PMP CART,AUT,G6/7,CNTR
1 EACH SUBCUTANEOUS EVERY OTHER DAY
Qty: 45 EACH | Refills: 3 | Status: SHIPPED | OUTPATIENT
Start: 2024-02-06 | End: 2024-02-09 | Stop reason: SDUPTHER

## 2024-02-07 ENCOUNTER — OFFICE VISIT (OUTPATIENT)
Dept: ENDOCRINOLOGY | Facility: CLINIC | Age: 49
End: 2024-02-07
Payer: MEDICAID

## 2024-02-07 ENCOUNTER — SPECIALTY PHARMACY (OUTPATIENT)
Dept: PHARMACY | Facility: HOSPITAL | Age: 49
End: 2024-02-07
Payer: MEDICAID

## 2024-02-07 VITALS
OXYGEN SATURATION: 95 % | WEIGHT: 278.4 LBS | SYSTOLIC BLOOD PRESSURE: 125 MMHG | BODY MASS INDEX: 44.74 KG/M2 | HEIGHT: 66 IN | DIASTOLIC BLOOD PRESSURE: 85 MMHG | HEART RATE: 97 BPM

## 2024-02-07 DIAGNOSIS — E13.9 LATENT AUTOIMMUNE DIABETES IN ADULTS (LADA), MANAGED AS TYPE 1: Primary | ICD-10-CM

## 2024-02-07 NOTE — PROGRESS NOTES
Chief Complaint   Patient presents with    Diabetes        Referring Provider  No ref. provider found     HPI   Marqutia Vernon is a 48 y.o. female had concerns including Diabetes.    T2DM.    She has been having issues, noting overt values on her CGM causing overt hyper and hypos varying.      Diabetes:  Diabetes was diagnosed 2009.  Complications include neuropathy.  Last ophtho exam was Feb 2022, [facility].  Current medications for diabetes include Novolog in an Omnipod insulin pump.  Past meds: Metformin-GI issues, Januvia-insurance issue  She checks her blood sugar with Dexcom CGM.  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  She has been noting improving BG's recently without hypos.  Current pump settings:  Basal:   12A: 1 u/hr  CR: 1:150  ISF: 23     She has recently switched PDM and entered her settings in.    ACE/ARB:no, Statin: no  Labs:  A1C:8.3 (5/2022)    The following portions of the patient's history were reviewed and updated as appropriate: allergies, current medications, past family history, past medical history, past social history, past surgical history and problem list.    Diet:   Breakfast: doesn't typically eat  Lunch: salad or left overs  Dinner: veggies, meat,   Drinks: diet mtn dew, milk  Snacks: popcorn    Past Medical History:   Diagnosis Date    Abnormal ECG     Arthritis     Asthma     Diabetes mellitus     GERD (gastroesophageal reflux disease)     IBS (irritable bowel syndrome)     Pancreatitis 2017    Scoliosis      Past Surgical History:   Procedure Laterality Date    CARDIOVASCULAR STRESS TEST  11/27/2018    L. Cardiolite- Unable to walk. EF 69%. Negative.    CHOLECYSTECTOMY      COLONOSCOPY      COLONOSCOPY N/A 9/21/2018    Procedure: COLONOSCOPY CPT CODE: 51593;  Surgeon: Micah Swartz MD;  Location: [facility] OR;  Service: Gastroenterology     "COLONOSCOPY N/A 1/30/2024    Procedure: COLONOSCOPY FOR SCREENING;  Surgeon: Rosita Boyd MD;  Location: [facility] OR;  Service: Gastroenterology;  Laterality: N/A;    CYST REMOVAL      ECHO - CONVERTED  11/27/2018    EF 65%. No AS. Mild MR    ENDOSCOPY      ENDOSCOPY N/A 9/21/2018    Procedure: ESOPHAGOGASTRODUODENOSCOPY WITH BIOPSY CPT CODE: 41951;  Surgeon: Micah Swartz MD;  Location: [facility] OR;  Service: Gastroenterology    OTHER SURGICAL HISTORY  01/02/2019    Event monitor- baseline sinus, one 39 beat SVT, no V-tach, no AFib, no pauses    TUBAL ABDOMINAL LIGATION      UPPER GASTROINTESTINAL ENDOSCOPY  2015 2018      Family History   Problem Relation Age of Onset    Diabetes Other     Cancer Other     Lung cancer Other     Heart attack Father         MI in his late 40's    No Known Problems Sister       Social History     Socioeconomic History    Marital status: Single   Tobacco Use    Smoking status: Never    Smokeless tobacco: Never   Vaping Use    Vaping Use: Never used   Substance and Sexual Activity    Alcohol use: No    Drug use: No    Sexual activity: Defer      Allergies   Allergen Reactions    Ibuprofen Hives and Other (See Comments)     \"smiley\"    Latex Hives    Vaseline [Petrolatum] Hives      Current Outpatient Medications on File Prior to Visit   Medication Sig Dispense Refill    Continuous Blood Gluc  (Dexcom G6 ) device Use 1 each Take As Directed. 1 each 0    Continuous Blood Gluc Sensor (Dexcom G6 Sensor) USE AS DIRECTED PER PACKAGE INSTRUCTIONS. *CHANGE SENSOR EVERY 10 DAYS* 3 each 11    Continuous Blood Gluc Transmit (Dexcom G6 Transmitter) misc Change Every 3 (Three) Months. 1 each 3    gabapentin (NEURONTIN) 300 MG capsule Take 400 mg by mouth 3 (Three) Times a Day As Needed (Pain).      glucagon (GlucaGen HypoKit) 1 MG injection Inject 1 mg into the appropriate muscle as directed by prescriber As Needed for Low Blood Sugar (for severe hypoglycemia). 1 " each 0    glucose 5 g chewable tablet Chew 3 tablets As Needed for Low Blood Sugar. 50 tablet 12    glucose blood (FREESTYLE LITE) test strip 1 each by Other route 4 (Four) Times a Day. 250 each 2    HYDROcodone-acetaminophen (NORCO) 5-325 MG per tablet Take 1 tablet by mouth Every 8 (Eight) Hours As Needed. Only takes PRN      Insulin Aspart (novoLOG) 100 UNIT/ML injection For use in insulin pump.  MDD: 100 30 mL 3    Insulin Disposable Pump (Omnipod 5 G6 Intro, Gen 5,) kit USE AS DIRECTED. CHANGE EACH POD EVERY 72 HOURS. 1 kit 0    Jardiance 25 MG tablet tablet TAKE ONE TABLET BY MOUTH ONCE DAILY. (Patient not taking: Reported on 2/7/2024) 30 tablet 6    lactobacillus acidophilus (RISAQUAD) capsule capsule Take 1 capsule by mouth Daily. 90 capsule 3    Lancets (freestyle) lancets USE 1 LANCET TO CHECK BLOOD GLUCOSE LEVELS FOUR TIMES DAILY AS DIRECTED 100 each 3    Loratadine 10 MG capsule Take 1 capsule by mouth Every Night.      methylcellulose (Citrucel) oral powder Take 2 Applications by mouth Daily. (Patient not taking: Reported on 2/7/2024) 454 g 11    multivitamin (THERAGRAN) tablet tablet Take 1 tablet by mouth Daily.      omeprazole (priLOSEC) 20 MG capsule Take 2 capsules by mouth 2 (Two) Times a Day. 30 capsule 0    PARoxetine (PAXIL) 10 MG tablet Take 1 tablet by mouth Every Morning.      [DISCONTINUED] Insulin Disposable Pump (Omnipod 5 G6 Pod, Gen 5,) misc Use 1 each Every Other Day. 45 each 3     No current facility-administered medications on file prior to visit.        Review of Systems   Constitutional:  Positive for fatigue. Negative for unexpected weight gain and unexpected weight loss.   Eyes: Negative.    Endocrine: Negative for polydipsia, polyphagia and polyuria.   Skin:  Positive for bruise.   Psychiatric/Behavioral:  Positive for sleep disturbance.    All other systems reviewed and are negative.    /85 (BP Location: Right leg, Patient Position: Sitting, Cuff Size: Adult)   Pulse 97   " Ht 167.6 cm (66\")   Wt 126 kg (278 lb 6.4 oz)   LMP 05/09/2018 (Approximate)   SpO2 95%   BMI 44.93 kg/m²      Physical Exam  Vitals reviewed.   Constitutional:       Appearance: Normal appearance.   Eyes:      Extraocular Movements: Extraocular movements intact.   Cardiovascular:      Rate and Rhythm: Tachycardia present.   Pulmonary:      Effort: Pulmonary effort is normal.   Skin:     General: Skin is warm and dry.   Neurological:      General: No focal deficit present.      Mental Status: She is alert and oriented to person, place, and time.   Psychiatric:         Mood and Affect: Mood normal.         Behavior: Behavior normal.         Thought Content: Thought content normal.         Judgment: Judgment normal.       CMP:  Lab Results   Component Value Date     (C) 08/14/2023    BUN 39 (H) 12/24/2023    CREATININE 1.44 (H) 12/24/2023    EGFRIFNONA 77 04/27/2019    BCR 27.1 (H) 12/24/2023     12/24/2023    K 4.5 12/24/2023    CO2 19.5 (L) 12/24/2023    CALCIUM 8.6 12/24/2023    ALBUMIN 3.7 12/24/2023    BILITOT 0.2 12/24/2023    ALKPHOS 92 12/24/2023    AST 28 12/24/2023    ALT 17 12/24/2023     Lipid Panel:  No results found for: \"CHOL\", \"TRIG\", \"HDL\", \"VLDL\", \"LDL\"  HbA1c:  Lab Results   Component Value Date    HGBA1C 7.2 (A) 10/31/2023    HGBA1C 7.5 (H) 08/13/2023     Glucose:  Lab Results   Component Value Date    POCGLU 188 (A) 01/30/2024     Microalbumin:  Lab Results   Component Value Date    MALBCRERATIO  04/04/2023      Comment:      Unable to calculate     TSH:  Lab Results   Component Value Date    TSH 0.708 09/27/2023       Assessment and Plan    Diagnoses and all orders for this visit:    1. 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  "   Very High: 56%  High: 26%  IN Range: 18%  Low: 0%  Very Low: 0%  Trend shows post meal hypers.    Reviewed settings and adjusted as below:  Basal:   12A: 1.4 u/hr  3A: 1.5 u/hr  CR: 1:150  ISF: 50  Corrected CR to 1:15, which was her prior settings.  Informed to notify office with overt values for setting adjustment as needed.  Follow-up at her next scheduled visit.    Orders:  -     POC Glycosylated Hemoglobin (Hb A1C)  -     Insulin Disposable Pump (Omnipod 5 G6 Pod, Gen 5,) misc; Use 1 each Every Other Day.  Dispense: 15 each; Refill: 3    2. Type 2 diabetes mellitus with hyperglycemia, with long-term current use of insulin               Return for Next scheduled follow up. The patient was instructed to contact the clinic with any interval questions or concerns.        This document has been electronically signed by ALIE Gonzalez  February 9, 2024 12:10 EST   Endocrinology

## 2024-02-07 NOTE — PROGRESS NOTES
"   Specialty Pharmacy Patient Management Program  Endocrinology Reassessment     Marquita Vernon is a 48 y.o. female seen by an Endocrinology Provider for Type 2 Diabetes and enrolled in the Endocrinology Patient Management program offered by [facility].  A follow-up outreach was conducted, including assessment of continued therapy appropriateness, medication adherence, and side effect incidence and management for Ozempic, insulin therapy, and CGM.    Patient presents to clinic today with concerns that her CGM and/or insulin pump are malfunctioning.    Patient is currently using Novolog in her Omniopod 5. She uses a Dexcomg G6 to monitor her BG. Patient reports her BG readings have been inconsisent for ~1 week, stating her \"BG will be normal and then all of a sudden the CGM says it shoots up.\" She reports her BG was 498 this morning per her CGM, and 322 per finger stick. She also states her insulin pump will notify her that it is switching from automatic mode to manual mode, as it detected \"auto mode was giving too much insulin.\"     Of note, patient is wearing both the CGM and insulin pump on her right upper shoulder today.    Patient denies personal/family history of thyroid cancer, reports history of pancreatitis (2018, patient denies any issues since being on Ozempic), and denies issues with recurrent UTI/yeast infections.      In the past, the patient has not tried any other diabetes medications.     Changes to Insurance Coverage or Financial Support  [employer]      Relevant Past Medical History and Comorbidities  Relevant medical history and concomitant health conditions were discussed with the patient. The patient's chart has been reviewed for relevant past medical history and comorbid health conditions and updated as necessary.   Past Medical History:   Diagnosis Date    Abnormal ECG     Arthritis     Asthma     Diabetes mellitus     GERD (gastroesophageal reflux disease)     IBS " "(irritable bowel syndrome)     Pancreatitis 2017    Scoliosis      Social History     Socioeconomic History    Marital status: Single   Tobacco Use    Smoking status: Never    Smokeless tobacco: Never   Vaping Use    Vaping Use: Never used   Substance and Sexual Activity    Alcohol use: No    Drug use: No    Sexual activity: Defer            Hospitalizations and Urgent Care Since Last Assessment  ED Visits, Admissions or Hospitalizations : 2 - DKA  Urgent Office Visits: None    Allergies  Known allergies and reactions were discussed with the patient. The patient's chart has been reviewed for allergy information and updated as necessary.   Allergies   Allergen Reactions    Ibuprofen Hives and Other (See Comments)     \"smiley\"    Latex Hives    Vaseline [Petrolatum] Hives       Allergies reviewed by Laura Luis RPH on 2/7/2024 at  1:28 PM  Allergies reviewed by Laura Luis RPH on 2/7/2024 at  1:28 PM    Relevant Laboratory Values  A1C Last 3 Results          6/19/2023    06:47 8/13/2023    15:23 8/13/2023    21:43 10/31/2023    13:50   HGBA1C Last 3 Results   Hemoglobin A1C 8.30  7.70  7.5     7.2       Details          This result is from an external source.             Lab Results   Component Value Date    HGBA1C 7.2 (A) 10/31/2023     Lab Results   Component Value Date    GLUCOSE 197 (H) 12/24/2023    CALCIUM 8.6 12/24/2023     12/24/2023    K 4.5 12/24/2023    CO2 19.5 (L) 12/24/2023     12/24/2023    BUN 39 (H) 12/24/2023    CREATININE 1.44 (H) 12/24/2023    EGFRIFNONA 77 04/27/2019    BCR 27.1 (H) 12/24/2023    ANIONGAP 12.5 12/24/2023     No results found for: \"CHOL\", \"CHLPL\", \"TRIG\", \"HDL\", \"LDL\", \"LDLDIRECT\"  Microalbumin          4/4/2023    08:38   Microalbumin   Microalbumin, Urine <1.2        Current Medication List  This medication list has been reviewed with the patient and evaluated for any interactions or necessary modifications/recommendations, and updated to include all " prescription medications, OTC medications, and supplements the patient is currently taking. This list reflects what is contained in the patient's profile, which has also been marked as reviewed to communicate to other providers it is the most up to date version of the patient's current medication therapy.     Current Outpatient Medications:     Continuous Blood Gluc  (Dexcom G6 ) device, Use 1 each Take As Directed., Disp: 1 each, Rfl: 0    Continuous Blood Gluc Sensor (Dexcom G6 Sensor), USE AS DIRECTED PER PACKAGE INSTRUCTIONS. *CHANGE SENSOR EVERY 10 DAYS*, Disp: 3 each, Rfl: 11    Continuous Blood Gluc Transmit (Dexcom G6 Transmitter) misc, Change Every 3 (Three) Months., Disp: 1 each, Rfl: 3    gabapentin (NEURONTIN) 300 MG capsule, Take 400 mg by mouth 3 (Three) Times a Day As Needed (Pain)., Disp: , Rfl:     glucagon (GlucaGen HypoKit) 1 MG injection, Inject 1 mg into the appropriate muscle as directed by prescriber As Needed for Low Blood Sugar (for severe hypoglycemia)., Disp: 1 each, Rfl: 0    glucose 5 g chewable tablet, Chew 3 tablets As Needed for Low Blood Sugar., Disp: 50 tablet, Rfl: 12    glucose blood (FREESTYLE LITE) test strip, 1 each by Other route 4 (Four) Times a Day., Disp: 250 each, Rfl: 2    HYDROcodone-acetaminophen (NORCO) 5-325 MG per tablet, Take 1 tablet by mouth Every 8 (Eight) Hours As Needed. Only takes PRN, Disp: , Rfl:     Insulin Aspart (novoLOG) 100 UNIT/ML injection, For use in insulin pump.  MDD: 100, Disp: 30 mL, Rfl: 3    Insulin Disposable Pump (Omnipod 5 G6 Intro, Gen 5,) kit, USE AS DIRECTED. CHANGE EACH POD EVERY 72 HOURS., Disp: 1 kit, Rfl: 0    Insulin Disposable Pump (Omnipod 5 G6 Pod, Gen 5,) misc, Use 1 each Every Other Day., Disp: 45 each, Rfl: 3    lactobacillus acidophilus (RISAQUAD) capsule capsule, Take 1 capsule by mouth Daily., Disp: 90 capsule, Rfl: 3    Lancets (freestyle) lancets, USE 1 LANCET TO CHECK BLOOD GLUCOSE LEVELS FOUR TIMES DAILY  AS DIRECTED, Disp: 100 each, Rfl: 3    Loratadine 10 MG capsule, Take 1 capsule by mouth Every Night., Disp: , Rfl:     multivitamin (THERAGRAN) tablet tablet, Take 1 tablet by mouth Daily., Disp: , Rfl:     omeprazole (priLOSEC) 20 MG capsule, Take 2 capsules by mouth 2 (Two) Times a Day., Disp: 30 capsule, Rfl: 0    PARoxetine (PAXIL) 10 MG tablet, Take 1 tablet by mouth Every Morning., Disp: , Rfl:     Jardiance 25 MG tablet tablet, TAKE ONE TABLET BY MOUTH ONCE DAILY. (Patient not taking: Reported on 2/7/2024), Disp: 30 tablet, Rfl: 6    methylcellulose (Citrucel) oral powder, Take 2 Applications by mouth Daily. (Patient not taking: Reported on 2/7/2024), Disp: 454 g, Rfl: 11  No current facility-administered medications for this visit.    Medicines reviewed by Laura Luis RPH on 2/7/2024 at  1:30 PM    Drug Interactions  Paroxetine + Jardiance, Novolog, Lantus and Ozempic: Selective Serotonin Reuptake Inhibitors may enhance the hypoglycemic effect of Agents with Blood Glucose Lowering Effects.      Metoprolol + Jardiance, Novolog, Lantus and Ozempic: Beta-Blockers (Beta1 Selective) may enhance the hypoglycemic effect of Antidiabetic Agents    Recommended Medications Assessment  Aspirin: Not Taking Currently  Statin: Not Taking Currently  ACEi/ARB: Not Taking Currently    Current 10-Year ASCVD Risk:     Adverse Drug Reactions  Adverse Reactions Experienced: none    Medication tolerability: Tolerating with no to minimal ADRs  Medication plan: Continue therapy with normal follow-up  Plan for ADR Management: none needed at this time      Adherence, Self-Administration, and Current Therapy Problems  Adherence related to the patient's specialty therapy was discussed with the patient.  New or Ongoing Barriers to Patient Adherence and/or Self-Administration: none  Methods for Supporting Patient Adherence and/or Self-Administration: none needed at this time    Goals of Therapy  Goals related to the patient's  specialty therapy was discussed with the patient. The Patient Goals segment of this outreach has been reviewed and updated.    Goals Addressed Today        Consistently take medications as prescribed      HEMOGLOBIN A1C < 7              Reassessment Plan & Follow-Up  Patient's diabetes is uncontrolled with A1C of 7.2%.  Medication Therapy Changes: none discussed with patient   Related Plans, Therapy Recommendations, or Issues to Be Addressed:   Location of patient's CGM and insulin pump are inappropriate. Patient applied both to the upper right shoulder, indicating administration is likely intramuscular rather than subcutaneously as indicated. Provider notified. Patient counseled on appropriate application sites for CGM and insulin pump.     Patient does not wish to use [facility] services at this time due to: loyalty to independent pharmacy    Attestation  I attest the patient was actively involved in and has agreed to the above plan of care. If the prescribed therapy is at any point deemed not appropriate based on the current or future assessments, a consultation will be initiated with the patient's specialty care provider to determine the best course of action. The revised plan of therapy will be documented along with any required assessments and/or additional patient education provided.    Laura Luis RPH  PGY-1 Community Pharmacy Resident  2/8/2024  07:40 EST

## 2024-02-09 RX ORDER — INSULIN PMP CART,AUT,G6/7,CNTR
1 EACH SUBCUTANEOUS EVERY OTHER DAY
Qty: 15 EACH | Refills: 3 | Status: SHIPPED | OUTPATIENT
Start: 2024-02-09

## 2024-02-09 NOTE — ASSESSMENT & PLAN NOTE
After reviewing her insulin pump download, it shows were patient administered her meal time bolus and then the pump auto bolused as her BG increased, which is what resulted in her significant hypo.    Very High: 56%  High: 26%  IN Range: 18%  Low: 0%  Very Low: 0%  Trend shows post meal hypers.    Reviewed settings and adjusted as below:  Basal:   12A: 1.4 u/hr  3A: 1.5 u/hr  CR: 1:150  ISF: 50  Corrected CR to 1:15, which was her prior settings.  Informed to notify office with overt values for setting adjustment as needed.  Follow-up at her next scheduled visit.

## 2024-02-15 RX ORDER — PROCHLORPERAZINE 25 MG/1
1 SUPPOSITORY RECTAL
Qty: 1 EACH | Refills: 3 | Status: SHIPPED | OUTPATIENT
Start: 2024-02-15

## 2024-02-19 RX ORDER — SEMAGLUTIDE 1.34 MG/ML
INJECTION, SOLUTION SUBCUTANEOUS
Qty: 3 ML | Refills: 2 | Status: SHIPPED | OUTPATIENT
Start: 2024-02-19 | End: 2024-02-21

## 2024-02-20 ENCOUNTER — TELEPHONE (OUTPATIENT)
Dept: ENDOCRINOLOGY | Facility: CLINIC | Age: 49
End: 2024-02-20
Payer: MEDICAID

## 2024-02-20 NOTE — TELEPHONE ENCOUNTER
Patient called and left voicemail. She is having lows at night and high during the day. She is wanting to know if she need to come in the office to be seen.

## 2024-02-21 ENCOUNTER — OFFICE VISIT (OUTPATIENT)
Dept: PULMONOLOGY | Facility: CLINIC | Age: 49
End: 2024-02-21
Payer: MEDICAID

## 2024-02-21 ENCOUNTER — OFFICE VISIT (OUTPATIENT)
Dept: ENDOCRINOLOGY | Facility: CLINIC | Age: 49
End: 2024-02-21
Payer: MEDICAID

## 2024-02-21 VITALS
DIASTOLIC BLOOD PRESSURE: 88 MMHG | SYSTOLIC BLOOD PRESSURE: 128 MMHG | HEIGHT: 66 IN | BODY MASS INDEX: 46.28 KG/M2 | TEMPERATURE: 97.9 F | OXYGEN SATURATION: 96 % | HEART RATE: 102 BPM | WEIGHT: 288 LBS

## 2024-02-21 VITALS
WEIGHT: 278.4 LBS | HEART RATE: 95 BPM | BODY MASS INDEX: 44.74 KG/M2 | HEIGHT: 66 IN | SYSTOLIC BLOOD PRESSURE: 128 MMHG | OXYGEN SATURATION: 97 % | DIASTOLIC BLOOD PRESSURE: 85 MMHG

## 2024-02-21 DIAGNOSIS — R91.1 PULMONARY NODULE: ICD-10-CM

## 2024-02-21 DIAGNOSIS — J45.20 MILD INTERMITTENT ASTHMA WITHOUT COMPLICATION: Primary | ICD-10-CM

## 2024-02-21 DIAGNOSIS — E13.9 LADA (LATENT AUTOIMMUNE DIABETES IN ADULTS), MANAGED AS TYPE 2: Primary | ICD-10-CM

## 2024-02-21 DIAGNOSIS — G47.33 OSA (OBSTRUCTIVE SLEEP APNEA): ICD-10-CM

## 2024-02-21 RX ORDER — SEMAGLUTIDE 0.68 MG/ML
0.25 INJECTION, SOLUTION SUBCUTANEOUS WEEKLY
Qty: 3 ML | Refills: 2 | Status: SHIPPED | OUTPATIENT
Start: 2024-02-21

## 2024-02-21 RX ORDER — ALBUTEROL SULFATE 90 UG/1
2 AEROSOL, METERED RESPIRATORY (INHALATION) EVERY 4 HOURS PRN
Qty: 6.7 G | Refills: 5 | Status: SHIPPED | OUTPATIENT
Start: 2024-02-21

## 2024-02-21 NOTE — ASSESSMENT & PLAN NOTE
After review of her insulin pump download, she is having more hypers than hypos and when she does have hypos, it's due to administering meal time insulin and and having to give extra corrective doses.   Very High: 57%  High: 27%  IN Range: 16%  Low: 0%  Very Low: 0%  Trend shows post meal hypers.    Reviewed settings and adjusted as below:  Basal:   12A: 1.3 u/hr  CR: 1:10  SF: 50  BG Threshold: 140  -Start Ozempic 0.25 mg weekly.  Patient has no personal history of pancreatitis, no family history of MEN syndrome or medullary thyroid cancer. Possible side effects including nausea, bloating, other GI upset and rarely pancreatitis were discussed. She was advised to call the office with any symptoms or concerns.   Informed to notify office with overt values for setting adjustment as needed.  Follow-up at her next scheduled visit.

## 2024-02-21 NOTE — PROGRESS NOTES
Chief Complaint   Patient presents with    Diabetes        Referring Provider  No ref. provider found     HPI   Marquita Vernon is a 48 y.o. female had concerns including Diabetes.    T2DM.    She has been having issues, noting overt values on her CGM causing overt hyper and hypos varying.  She has been having more hypers than hypos and has not been able to get her BG's in range.  She is asking about start on GLP-1.  She was on this in the past and tolerated it well.    Diabetes:  Diabetes was diagnosed 2009.  Complications include neuropathy.  [facility]ho [facility] was Feb 2022[facility].  Current medications for diabetes include Novolog in an Omnipod insulin pump.  Past meds: Metformin-GI issues, Januvia-insurance issue  She checks her blood sugar with Dexcom CGM.  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  She has been noting improving BG's recently without hypos.  Current pump settings:  Basal:   12A: 1 u/hr  CR: 1:150  ISF: 23     She has recently switched PDM and entered her settings in.    ACE/ARB:no, Statin: no  Labs:  A1C:8.3 (5/2022)    The following portions of the patient's history were reviewed and updated as appropriate: allergies, current medications, past family history, past medical history, past social history, past surgical history and problem list.    Diet:   Breakfast: doesn't typically eat  Lunch: salad or left overs  Dinner: veggies, meat,   Drinks: diet mtn dew, milk  Snacks: popcorn    Past Medical History:   Diagnosis Date    Abnormal ECG     Arthritis     Asthma     Diabetes mellitus     GERD (gastroesophageal reflux disease)     IBS (irritable bowel syndrome)     Pancreatitis 2017    Scoliosis      Past Surgical History:   Procedure Laterality Date    CARDIOVASCULAR STRESS TEST  11/27/2018    L. Cardiolite- Unable to walk. EF 69%. Negative.    CHOLECYSTECTOMY       "COLONOSCOPY      COLONOSCOPY N/A 9/21/2018    Procedure: COLONOSCOPY CPT CODE: 01171;  Surgeon: Micah Swartz MD;  Location: [facility] OR;  Service: Gastroenterology    COLONOSCOPY N/A 1/30/2024    Procedure: COLONOSCOPY FOR SCREENING;  Surgeon: Rosita Boyd MD;  Location: [facility] OR;  Service: Gastroenterology;  Laterality: N/A;    CYST REMOVAL      ECHO - CONVERTED  11/27/2018    EF 65%. No AS. Mild MR    ENDOSCOPY      ENDOSCOPY N/A 9/21/2018    Procedure: ESOPHAGOGASTRODUODENOSCOPY WITH BIOPSY CPT CODE: 07198;  Surgeon: Micah Swartz MD;  Location: [facility] OR;  Service: Gastroenterology    OTHER SURGICAL HISTORY  01/02/2019    Event monitor- baseline sinus, one 39 beat SVT, no V-tach, no AFib, no pauses    TUBAL ABDOMINAL LIGATION      UPPER GASTROINTESTINAL ENDOSCOPY  2015 2018      Family History   Problem Relation Age of Onset    Diabetes Other     Cancer Other     Lung cancer Other     Heart attack Father         MI in his late 40's    No Known Problems Sister       Social History     Socioeconomic History    Marital status: Single   Tobacco Use    Smoking status: Never    Smokeless tobacco: Never   Vaping Use    Vaping Use: Never used   Substance and Sexual Activity    Alcohol use: No    Drug use: No    Sexual activity: Defer      Allergies   Allergen Reactions    Ibuprofen Hives and Other (See Comments)     \"smiley\"    Latex Hives    Vaseline [Petrolatum] Hives      Current Outpatient Medications on File Prior to Visit   Medication Sig Dispense Refill    Continuous Blood Gluc  (Dexcom G6 ) device Use 1 each Take As Directed. 1 each 0    Continuous Blood Gluc Sensor (Dexcom G6 Sensor) USE AS DIRECTED PER PACKAGE INSTRUCTIONS. *CHANGE SENSOR EVERY 10 DAYS* 3 each 11    Continuous Blood Gluc Transmit (Dexcom G6 Transmitter) misc Change Every 3 (Three) Months. 1 each 3    gabapentin (NEURONTIN) 300 MG capsule Take 400 mg by mouth 3 (Three) Times a Day As Needed " (Pain).      glucagon (GlucaGen HypoKit) 1 MG injection Inject 1 mg into the appropriate muscle as directed by prescriber As Needed for Low Blood Sugar (for severe hypoglycemia). 1 each 0    glucose 5 g chewable tablet Chew 3 tablets As Needed for Low Blood Sugar. 50 tablet 12    glucose blood (FREESTYLE LITE) test strip 1 each by Other route 4 (Four) Times a Day. 250 each 2    HYDROcodone-acetaminophen (NORCO) 5-325 MG per tablet Take 1 tablet by mouth Every 8 (Eight) Hours As Needed. Only takes PRN      Insulin Aspart (novoLOG) 100 UNIT/ML injection For use in insulin pump.  MDD: 100 30 mL 3    Insulin Disposable Pump (Omnipod 5 G6 Intro, Gen 5,) kit USE AS DIRECTED. CHANGE EACH POD EVERY 72 HOURS. 1 kit 0    Insulin Disposable Pump (Omnipod 5 G6 Pod, Gen 5,) misc Use Every Other Day as directed. 15 each 3    Jardiance 25 MG tablet tablet TAKE ONE TABLET BY MOUTH ONCE DAILY. (Patient not taking: Reported on 2/7/2024) 30 tablet 6    lactobacillus acidophilus (RISAQUAD) capsule capsule Take 1 capsule by mouth Daily. 90 capsule 3    Lancets (freestyle) lancets USE 1 LANCET TO CHECK BLOOD GLUCOSE LEVELS FOUR TIMES DAILY AS DIRECTED 100 each 3    Loratadine 10 MG capsule Take 1 capsule by mouth Every Night.      methylcellulose (Citrucel) oral powder Take 2 Applications by mouth Daily. (Patient not taking: Reported on 2/7/2024) 454 g 11    multivitamin (THERAGRAN) tablet tablet Take 1 tablet by mouth Daily.      omeprazole (priLOSEC) 20 MG capsule Take 2 capsules by mouth 2 (Two) Times a Day. 30 capsule 0    PARoxetine (PAXIL) 10 MG tablet Take 1 tablet by mouth Every Morning.      [DISCONTINUED] Ozempic, 1 MG/DOSE, 4 MG/3ML solution pen-injector INJECT 1 MILLIGRAM SUBCUTANEOUSLY EVERY WEEK 3 mL 2     No current facility-administered medications on file prior to visit.        Review of Systems   Constitutional:  Positive for fatigue. Negative for unexpected weight gain and unexpected weight loss.   Eyes: Negative.   "  Endocrine: Negative for polydipsia, polyphagia and polyuria.   Skin:  Positive for bruise.   Psychiatric/Behavioral:  Positive for sleep disturbance.    All other systems reviewed and are negative.    /85 (BP Location: Right arm, Patient Position: Sitting, Cuff Size: Adult)   Pulse 95   Ht 167.6 cm (66\")   Wt 126 kg (278 lb 6.4 oz)   LMP 05/09/2018 (Approximate)   SpO2 97%   BMI 44.93 kg/m²      Physical Exam  Vitals reviewed.   Constitutional:       Appearance: Normal appearance.   Eyes:      Extraocular Movements: Extraocular movements intact.   Cardiovascular:      Rate and Rhythm: Tachycardia present.   Pulmonary:      Effort: Pulmonary effort is normal.   Skin:     General: Skin is warm and dry.   Neurological:      General: No focal deficit present.      Mental Status: She is alert and oriented to person, place, and time.   Psychiatric:         Mood and Affect: Mood normal.         Behavior: Behavior normal.         Thought Content: Thought content normal.         Judgment: Judgment normal.       CMP:  Lab Results   Component Value Date     (C) 08/14/2023    BUN 39 (H) 12/24/2023    CREATININE 1.44 (H) 12/24/2023    EGFRIFNONA 77 04/27/2019    BCR 27.1 (H) 12/24/2023     12/24/2023    K 4.5 12/24/2023    CO2 19.5 (L) 12/24/2023    CALCIUM 8.6 12/24/2023    ALBUMIN 3.7 12/24/2023    BILITOT 0.2 12/24/2023    ALKPHOS 92 12/24/2023    AST 28 12/24/2023    ALT 17 12/24/2023     Lipid Panel:  No results found for: \"CHOL\", \"TRIG\", \"HDL\", \"VLDL\", \"LDL\"  HbA1c:  Lab Results   Component Value Date    HGBA1C 7.2 (A) 10/31/2023    HGBA1C 7.5 (H) 08/13/2023     Glucose:  Lab Results   Component Value Date    POCGLU 188 (A) 01/30/2024     Microalbumin:  Lab Results   Component Value Date    MALBCRERATIO  04/04/2023      Comment:      Unable to calculate     TSH:  Lab Results   Component Value Date    TSH 0.708 09/27/2023       Assessment and Plan    Diagnoses and all orders for this visit:    1. 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   Very High: 57%  High: 27%  IN Range: 16%  Low: 0%  Very Low: 0%  Trend shows post meal hypers.    Reviewed settings and adjusted as below:  Basal:   12A: 1.3 u/hr  CR: 1:10  SF: 50  BG Threshold: 140  -Start Ozempic 0.25 mg weekly.  Patient has no personal history of pancreatitis, no family history of MEN syndrome or medullary thyroid cancer. Possible side effects including nausea, bloating, other GI upset and rarely pancreatitis were discussed. She was advised to call the office with any symptoms or concerns.   Informed to notify office with overt values for setting adjustment as needed.  Follow-up at her next scheduled visit.      Other orders  -     Semaglutide,0.25 or 0.5MG/DOS, (Ozempic, 0.25 or 0.5 MG/DOSE,) 2 MG/3ML solution pen-injector; Inject 0.25 mg under the skin into the appropriate area as directed 1 (One) Time Per Week.  Dispense: 3 mL; Refill: 2                 Return for Next scheduled follow up. The patient was instructed to contact the clinic with any interval questions or concerns.        This document has been electronically signed by ALIE Gonzalez  February 21, 2024 14:55 EST   Endocrinology
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
## 2024-03-01 RX ORDER — GLUCAGON 1 MG
1 KIT INJECTION AS NEEDED
Qty: 1 EACH | Refills: 4 | Status: SHIPPED | OUTPATIENT
Start: 2024-03-01

## 2024-03-18 RX ORDER — LANCETS 28 GAUGE
EACH MISCELLANEOUS
Qty: 100 EACH | Refills: 3 | Status: SHIPPED | OUTPATIENT
Start: 2024-03-18

## 2024-03-26 ENCOUNTER — HOSPITAL ENCOUNTER (EMERGENCY)
Facility: HOSPITAL | Age: 49
Discharge: HOME | End: 2024-03-27
Attending: INTERNAL MEDICINE | Admitting: INTERNAL MEDICINE
Payer: MEDICAID

## 2024-03-26 DIAGNOSIS — R73.9 HYPERGLYCEMIA: Primary | ICD-10-CM

## 2024-03-26 LAB
ALBUMIN SERPL-MCNC: 4.3 G/DL (ref 3.5–5.2)
ALBUMIN/GLOB SERPL: 1.2 G/DL
ALP SERPL-CCNC: 138 U/L (ref 39–117)
ALT SERPL W P-5'-P-CCNC: 20 U/L (ref 1–33)
ANION GAP SERPL CALCULATED.3IONS-SCNC: 13 MMOL/L (ref 5–15)
AST SERPL-CCNC: 15 U/L (ref 1–32)
BASOPHILS # BLD AUTO: 0.07 10*3/MM3 (ref 0–0.2)
BASOPHILS NFR BLD AUTO: 0.7 % (ref 0–1.5)
BILIRUB SERPL-MCNC: 0.3 MG/DL (ref 0–1.2)
BUN SERPL-MCNC: 28 MG/DL (ref 6–20)
BUN/CREAT SERPL: 20.7 (ref 7–25)
CALCIUM SPEC-SCNC: 9.4 MG/DL (ref 8.6–10.5)
CHLORIDE SERPL-SCNC: 106 MMOL/L (ref 98–107)
CO2 SERPL-SCNC: 18 MMOL/L (ref 22–29)
CREAT SERPL-MCNC: 1.35 MG/DL (ref 0.57–1)
DEPRECATED RDW RBC AUTO: 46 FL (ref 37–54)
EGFRCR SERPLBLD CKD-EPI 2021: 48.6 ML/MIN/1.73
EOSINOPHIL # BLD AUTO: 0.32 10*3/MM3 (ref 0–0.4)
EOSINOPHIL NFR BLD AUTO: 3.2 % (ref 0.3–6.2)
ERYTHROCYTE [DISTWIDTH] IN BLOOD BY AUTOMATED COUNT: 13.8 % (ref 12.3–15.4)
GLOBULIN UR ELPH-MCNC: 3.5 GM/DL
GLUCOSE BLDC GLUCOMTR-MCNC: 429 MG/DL (ref 70–130)
GLUCOSE SERPL-MCNC: 330 MG/DL (ref 65–99)
HCT VFR BLD AUTO: 38.3 % (ref 34–46.6)
HGB BLD-MCNC: 12.3 G/DL (ref 12–15.9)
HOLD SPECIMEN: NORMAL
HOLD SPECIMEN: NORMAL
IMM GRANULOCYTES # BLD AUTO: 0.08 10*3/MM3 (ref 0–0.05)
IMM GRANULOCYTES NFR BLD AUTO: 0.8 % (ref 0–0.5)
LIPASE SERPL-CCNC: 58 U/L (ref 13–60)
LYMPHOCYTES # BLD AUTO: 2.07 10*3/MM3 (ref 0.7–3.1)
LYMPHOCYTES NFR BLD AUTO: 20.9 % (ref 19.6–45.3)
MCH RBC QN AUTO: 29.3 PG (ref 26.6–33)
MCHC RBC AUTO-ENTMCNC: 32.1 G/DL (ref 31.5–35.7)
MCV RBC AUTO: 91.2 FL (ref 79–97)
MONOCYTES # BLD AUTO: 0.51 10*3/MM3 (ref 0.1–0.9)
MONOCYTES NFR BLD AUTO: 5.2 % (ref 5–12)
NEUTROPHILS NFR BLD AUTO: 6.84 10*3/MM3 (ref 1.7–7)
NEUTROPHILS NFR BLD AUTO: 69.2 % (ref 42.7–76)
NRBC BLD AUTO-RTO: 0 /100 WBC (ref 0–0.2)
PLATELET # BLD AUTO: 246 10*3/MM3 (ref 140–450)
PMV BLD AUTO: 11.6 FL (ref 6–12)
POTASSIUM SERPL-SCNC: 5.2 MMOL/L (ref 3.5–5.2)
PROT SERPL-MCNC: 7.8 G/DL (ref 6–8.5)
RBC # BLD AUTO: 4.2 10*6/MM3 (ref 3.77–5.28)
SODIUM SERPL-SCNC: 137 MMOL/L (ref 136–145)
WBC NRBC COR # BLD AUTO: 9.89 10*3/MM3 (ref 3.4–10.8)
WHOLE BLOOD HOLD COAG: NORMAL
WHOLE BLOOD HOLD SPECIMEN: NORMAL

## 2024-03-26 PROCEDURE — 36415 COLL VENOUS BLD VENIPUNCTURE: CPT

## 2024-03-26 PROCEDURE — 82948 REAGENT STRIP/BLOOD GLUCOSE: CPT

## 2024-03-26 PROCEDURE — 85025 COMPLETE CBC W/AUTO DIFF WBC: CPT | Performed by: STUDENT IN AN ORGANIZED HEALTH CARE EDUCATION/TRAINING PROGRAM

## 2024-03-26 PROCEDURE — 80053 COMPREHEN METABOLIC PANEL: CPT | Performed by: STUDENT IN AN ORGANIZED HEALTH CARE EDUCATION/TRAINING PROGRAM

## 2024-03-26 PROCEDURE — 63710000001 INSULIN LISPRO (HUMAN) PER 5 UNITS: Performed by: INTERNAL MEDICINE

## 2024-03-26 PROCEDURE — 25810000003 SODIUM CHLORIDE 0.9 % SOLUTION: Performed by: INTERNAL MEDICINE

## 2024-03-26 PROCEDURE — 83690 ASSAY OF LIPASE: CPT | Performed by: STUDENT IN AN ORGANIZED HEALTH CARE EDUCATION/TRAINING PROGRAM

## 2024-03-26 PROCEDURE — 99283 EMERGENCY DEPT VISIT LOW MDM: CPT

## 2024-03-26 RX ORDER — INSULIN LISPRO 100 [IU]/ML
8 INJECTION, SOLUTION INTRAVENOUS; SUBCUTANEOUS ONCE
Status: COMPLETED | OUTPATIENT
Start: 2024-03-26 | End: 2024-03-26

## 2024-03-26 RX ORDER — SODIUM CHLORIDE 0.9 % (FLUSH) 0.9 %
10 SYRINGE (ML) INJECTION AS NEEDED
Status: DISCONTINUED | OUTPATIENT
Start: 2024-03-26 | End: 2024-03-27 | Stop reason: HOSPADM

## 2024-03-26 RX ORDER — PROCHLORPERAZINE 25 MG/1
1 SUPPOSITORY RECTAL
Qty: 1 EACH | Refills: 3 | Status: SHIPPED | OUTPATIENT
Start: 2024-03-26

## 2024-03-26 RX ADMIN — INSULIN LISPRO 8 UNITS: 100 INJECTION, SOLUTION INTRAVENOUS; SUBCUTANEOUS at 22:11

## 2024-03-26 RX ADMIN — SODIUM CHLORIDE 1000 ML: 9 INJECTION, SOLUTION INTRAVENOUS at 22:11

## 2024-03-27 VITALS
SYSTOLIC BLOOD PRESSURE: 126 MMHG | HEIGHT: 66 IN | TEMPERATURE: 98.2 F | DIASTOLIC BLOOD PRESSURE: 95 MMHG | RESPIRATION RATE: 18 BRPM | BODY MASS INDEX: 47.09 KG/M2 | OXYGEN SATURATION: 97 % | HEART RATE: 99 BPM | WEIGHT: 293 LBS

## 2024-03-27 LAB — GLUCOSE BLDC GLUCOMTR-MCNC: 73 MG/DL (ref 70–130)

## 2024-03-27 PROCEDURE — 82948 REAGENT STRIP/BLOOD GLUCOSE: CPT

## 2024-03-27 NOTE — ED PROVIDER NOTES
"Subjective   History of Present Illness  Ms. Vernon is a 48-year-old female who presented to [facility] emergency department on 3/26/2024 with a chief complaint of elevated serum potassium and diarrhea.  Patient states she recently saw her primary care provider who did basic laboratory work and was told that her serum potassium was 7 and then repeat serum potassium was 6.9.  For this reason, she was instructed to report to the emergency department for further treatment and evaluation.  Patient does also report several episodes of diarrhea today but otherwise denies nausea, vomiting, fever, chills, sweats or rigors.        Review of Systems   Gastrointestinal:  Positive for diarrhea.   All other systems reviewed and are negative.      Past Medical History:   Diagnosis Date    Abnormal ECG     Arthritis     Asthma     Asthma, extrinsic 1979    Diabetes mellitus     GERD (gastroesophageal reflux disease)     IBS (irritable bowel syndrome)     Lung nodule 12/24/23    Pancreatitis 2017    Scoliosis        Allergies   Allergen Reactions    Ibuprofen Hives and Other (See Comments)     \"smiley\"    Latex Hives    Vaseline [Petrolatum] Hives       Past Surgical History:   Procedure Laterality Date    CARDIOVASCULAR STRESS TEST  11/27/2018    L. Cardiolite- Unable to walk. EF 69%. Negative.    CHOLECYSTECTOMY      COLONOSCOPY      COLONOSCOPY N/A 9/21/2018    Procedure: COLONOSCOPY CPT CODE: 67025;  Surgeon: Micah Swartz MD;  Location: [facility] OR;  Service: Gastroenterology    COLONOSCOPY N/A 1/30/2024    Procedure: COLONOSCOPY FOR SCREENING;  Surgeon: Rosita Boyd MD;  Location: [facility] OR;  Service: Gastroenterology;  Laterality: N/A;    CYST REMOVAL      ECHO - CONVERTED  11/27/2018    EF 65%. No AS. Mild MR    ENDOSCOPY      ENDOSCOPY N/A 9/21/2018    Procedure: ESOPHAGOGASTRODUODENOSCOPY WITH BIOPSY CPT CODE: 37539;  Surgeon: Micah Swartz MD;  Location: [facility] OR;  Service: " Gastroenterology    OTHER SURGICAL HISTORY  01/02/2019    Event monitor- baseline sinus, one 39 beat SVT, no V-tach, no AFib, no pauses    TUBAL ABDOMINAL LIGATION      UPPER GASTROINTESTINAL ENDOSCOPY  2015 2018       Family History   Problem Relation Age of Onset    Diabetes Other     Cancer Other     Lung cancer Other     Heart attack Father         MI in his late 40's    Hypertension Father     No Known Problems Sister     Diabetes Maternal Grandmother        Social History     Socioeconomic History    Marital status: Single   Tobacco Use    Smoking status: Never     Passive exposure: Current    Smokeless tobacco: Never   Vaping Use    Vaping status: Never Used   Substance and Sexual Activity    Alcohol use: No    Drug use: No    Sexual activity: Not Currently     Partners: Male     Birth control/protection: Post-menopausal, Tubal ligation, Surgical           Objective   Physical Exam  Constitutional:       Appearance: Normal appearance. She is obese.   HENT:      Head: Normocephalic and atraumatic.      Nose: Nose normal.      Mouth/Throat:      Mouth: Mucous membranes are moist.   Eyes:      Extraocular Movements: Extraocular movements intact.      Pupils: Pupils are equal, round, and reactive to light.   Cardiovascular:      Rate and Rhythm: Normal rate and regular rhythm.   Pulmonary:      Effort: Pulmonary effort is normal.      Breath sounds: Normal breath sounds.   Abdominal:      General: Abdomen is flat.      Palpations: Abdomen is soft.   Musculoskeletal:         General: Normal range of motion.      Cervical back: Normal range of motion and neck supple.   Skin:     General: Skin is warm.   Neurological:      General: No focal deficit present.      Mental Status: She is alert and oriented to person, place, and time.   Psychiatric:         Mood and Affect: Mood normal.         Procedures           ED Course                                             Medical Decision Making  Initial lab work obtained  in the emergency department demonstrates normal potassium of 5.2 and all other lab work was within normal limits except for elevated blood sugar.  Patient did receive Humalog 6 units subcu x 1 dose which has corrected patient's blood sugar.  Patient is now asymptomatic.  Will recommend patient be discharged home with close follow-up with primary care provider within 1 week of discharge.    Amount and/or Complexity of Data Reviewed  Labs: ordered.    Risk  Prescription drug management.        Final diagnoses:   Hyperglycemia       ED Disposition  ED Disposition       ED Disposition   Discharge    Condition   Stable    Comment   --               Lan Guerra, APRN  [address]  [facility] [address]  [phone]    In 1 week           Medication List      No changes were made to your prescriptions during this visit.            Sarwat Alford,   03/27/24 0020

## 2024-03-29 ENCOUNTER — OFFICE VISIT (OUTPATIENT)
Dept: ENDOCRINOLOGY | Facility: CLINIC | Age: 49
End: 2024-03-29
Payer: MEDICAID

## 2024-03-29 ENCOUNTER — HOSPITAL ENCOUNTER (OUTPATIENT)
Dept: CT IMAGING | Facility: HOSPITAL | Age: 49
Discharge: HOME | End: 2024-03-29
Payer: MEDICAID

## 2024-03-29 VITALS
DIASTOLIC BLOOD PRESSURE: 90 MMHG | SYSTOLIC BLOOD PRESSURE: 135 MMHG | OXYGEN SATURATION: 98 % | BODY MASS INDEX: 47.32 KG/M2 | HEART RATE: 97 BPM | WEIGHT: 293 LBS

## 2024-03-29 DIAGNOSIS — R91.1 PULMONARY NODULE: ICD-10-CM

## 2024-03-29 DIAGNOSIS — E13.9 LADA (LATENT AUTOIMMUNE DIABETES IN ADULTS), MANAGED AS TYPE 2: Primary | ICD-10-CM

## 2024-03-29 LAB
CREAT BLDA-MCNC: 1.4 MG/DL (ref 0.6–1.3)
EXPIRATION DATE: ABNORMAL
EXPIRATION DATE: ABNORMAL
GLUCOSE BLDC GLUCOMTR-MCNC: 200 MG/DL (ref 70–130)
HBA1C MFR BLD: 7.9 % (ref 4.5–5.7)
Lab: ABNORMAL
Lab: ABNORMAL

## 2024-03-29 PROCEDURE — 25510000001 IOPAMIDOL 61 % SOLUTION: Performed by: INTERNAL MEDICINE

## 2024-03-29 PROCEDURE — 82565 ASSAY OF CREATININE: CPT

## 2024-03-29 PROCEDURE — 71260 CT THORAX DX C+: CPT

## 2024-03-29 RX ORDER — SEMAGLUTIDE 0.68 MG/ML
0.5 INJECTION, SOLUTION SUBCUTANEOUS WEEKLY
Qty: 3 ML | Refills: 2 | Status: SHIPPED | OUTPATIENT
Start: 2024-03-29

## 2024-03-29 RX ADMIN — IOPAMIDOL 87 ML: 612 INJECTION, SOLUTION INTRAVENOUS at 09:04

## 2024-03-29 NOTE — ASSESSMENT & PLAN NOTE
-Diabetes is unchanged with A1c 7.9.  -Discussed dietary and exercise guidelines with patient.  -Discussed the importance of yearly eye exams.  -Discussed the importance of checking BG's regularly. Continue using Dexcom CGM.    2 week pump data download is as follows:  Very High: 21%  High: 58%  In Range: 21%  Low: 0%  Very Low: 0%  Trend shows overall hypers with post supper hypers.  -Continue with current pump settings:  Basal:   12A: 1.4 u/hr  3A: 1.55 u/hr  CR: 1:15  ISF: 23   Discussed importance of administering meal time boluses to prevent overt hypers with rebound hypos.  -Increase Ozempic 0.5 mg weekly.  Patient has no personal history of pancreatitis, no family history of MEN syndrome or medullary thyroid cancer. Possible side effects including nausea, bloating, other GI upset and rarely pancreatitis were discussed. She was advised to call the office with any symptoms or concerns.   -S/S hypoglycemia reviewed with Rule of 15's advised.  -Follow-up in 3 months.

## 2024-03-29 NOTE — PROGRESS NOTES
Chief Complaint   Patient presents with    Follow-up        Referring Provider  No ref. provider found     HPI   Marquita Vernon is a 48 y.o. female had concerns including Follow-up.    T2DM.    She was recently in the hospital and was noted to have increased BG's.  She was not in DKA.  She reports that she was dehydrated.  They rehydrated her in the ER and sent her home.    Diabetes:  Diabetes was diagnosed 2009.  Complications include neuropathy.  Last ophtho exam was Feb 2022, [facility], [facility].  Current medications for diabetes include Novolog in an Omnipod insulin pump, Ozempic 0.25 mg weekly.  Past meds: Metformin-GI issues, Januvia-insurance issue  She checks her blood sugar with Dexcom CGM.  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  She has been noting improving BG's recently without hypos.  Current pump settings:  Basal:   12A: 1 u/hr  CR: 1:150  ISF: 23     She has recently switched PDM and entered her settings in.    ACE/ARB:no, Statin: no  Labs:  A1C:8.3 (5/2022)    The following portions of the patient's history were reviewed and updated as appropriate: allergies, current medications, past family history, past medical history, past social history, past surgical history and problem list.    Diet:   Breakfast: doesn't typically eat  Lunch: salad or left overs  Dinner: veggies, meat,   Drinks: diet mtn dew, milk  Snacks: popcorn    Past Medical History:   Diagnosis Date    Abnormal ECG     Arthritis     Asthma     Asthma, extrinsic 1979    Diabetes mellitus     GERD (gastroesophageal reflux disease)     IBS (irritable bowel syndrome)     Lung nodule 12/24/23    Pancreatitis 2017    Scoliosis      Past Surgical History:   Procedure Laterality Date    CARDIOVASCULAR STRESS TEST  11/27/2018    L. Cardiolite- Unable to walk. EF 69%. Negative.    CHOLECYSTECTOMY      COLONOSCOPY       "COLONOSCOPY N/A 9/21/2018    Procedure: COLONOSCOPY CPT CODE: 88074;  Surgeon: Micah Swartz MD;  Location: [facility] OR;  Service: Gastroenterology    COLONOSCOPY N/A 1/30/2024    Procedure: COLONOSCOPY FOR SCREENING;  Surgeon: Rosita Boyd MD;  Location: [facility] OR;  Service: Gastroenterology;  Laterality: N/A;    CYST REMOVAL      ECHO - CONVERTED  11/27/2018    EF 65%. No AS. Mild MR    ENDOSCOPY      ENDOSCOPY N/A 9/21/2018    Procedure: ESOPHAGOGASTRODUODENOSCOPY WITH BIOPSY CPT CODE: 67502;  Surgeon: Micah Swartz MD;  Location: [facility] OR;  Service: Gastroenterology    OTHER SURGICAL HISTORY  01/02/2019    Event monitor- baseline sinus, one 39 beat SVT, no V-tach, no AFib, no pauses    TUBAL ABDOMINAL LIGATION      UPPER GASTROINTESTINAL ENDOSCOPY  2015 2018      Family History   Problem Relation Age of Onset    Diabetes Other     Cancer Other     Lung cancer Other     Heart attack Father         MI in his late 40's    Hypertension Father     No Known Problems Sister     Diabetes Maternal Grandmother       Social History     Socioeconomic History    Marital status: Single   Tobacco Use    Smoking status: Never     Passive exposure: Current    Smokeless tobacco: Never   Vaping Use    Vaping status: Never Used   Substance and Sexual Activity    Alcohol use: No    Drug use: No    Sexual activity: Not Currently     Partners: Male     Birth control/protection: Post-menopausal, Tubal ligation, Surgical      Allergies   Allergen Reactions    Ibuprofen Hives and Other (See Comments)     \"smiley\"    Latex Hives    Vaseline [Petrolatum] Hives      Current Outpatient Medications on File Prior to Visit   Medication Sig Dispense Refill    albuterol sulfate  (90 Base) MCG/ACT inhaler Inhale 2 puffs Every 4 (Four) Hours As Needed for Wheezing. 6.7 g 5    Continuous Blood Gluc  (Dexcom G6 ) device Use 1 each Take As Directed. 1 each 0    Continuous Blood Gluc Sensor (Dexcom " G6 Sensor) USE AS DIRECTED PER PACKAGE INSTRUCTIONS. *CHANGE SENSOR EVERY 10 DAYS* 3 each 11    Continuous Blood Gluc Transmit (Dexcom G6 Transmitter) misc Change Every 3 (Three) Months. 1 each 3    gabapentin (NEURONTIN) 400 MG capsule Take 1 capsule by mouth 3 (Three) Times a Day As Needed (Pain).      Glucagon HCl (Glucagon Emergency) 1 MG/ML reconstituted solution INJECT 1 ML AS DIRECTED AS NEEDED (SEVERE HYPOGLYCEMIA). 1 each 4    glucose 5 g chewable tablet Chew 3 tablets As Needed for Low Blood Sugar. 50 tablet 12    glucose blood (FREESTYLE LITE) test strip 1 each by Other route 4 (Four) Times a Day. 250 each 2    HYDROcodone-acetaminophen (NORCO) 5-325 MG per tablet Take 1 tablet by mouth Every 8 (Eight) Hours As Needed. Only takes PRN      Insulin Aspart (novoLOG) 100 UNIT/ML injection For use in insulin pump.  MDD: 100 30 mL 3    Insulin Disposable Pump (Omnipod 5 G6 Intro, Gen 5,) kit USE AS DIRECTED. CHANGE EACH POD EVERY 72 HOURS. 1 kit 0    Insulin Disposable Pump (Omnipod 5 G6 Pod, Gen 5,) misc Use Every Other Day as directed. 15 each 3    lactobacillus acidophilus (RISAQUAD) capsule capsule Take 1 capsule by mouth Daily. 90 capsule 3    Lancets (freestyle) lancets USE 1 LANCET TO CHECK BLOOD GLUCOSE LEVELS FOUR TIMES DAILY AS DIRECTED 100 each 3    Loratadine 10 MG capsule Take 1 capsule by mouth Every Night.      multivitamin (THERAGRAN) tablet tablet Take 1 tablet by mouth Daily.      omeprazole (priLOSEC) 20 MG capsule Take 2 capsules by mouth 2 (Two) Times a Day. 30 capsule 0    PARoxetine (PAXIL) 10 MG tablet Take 1 tablet by mouth Every Morning.      [DISCONTINUED] Semaglutide,0.25 or 0.5MG/DOS, (Ozempic, 0.25 or 0.5 MG/DOSE,) 2 MG/3ML solution pen-injector Inject 0.25 mg under the skin into the appropriate area as directed 1 (One) Time Per Week. 3 mL 2     Current Facility-Administered Medications on File Prior to Visit   Medication Dose Route Frequency Provider Last Rate Last Admin     "[COMPLETED] iopamidol (ISOVUE-300) 61 % injection 100 mL  100 mL Intravenous Once in imaging Tom Darling MD   87 mL at 03/29/24 0904        Review of Systems   Constitutional:  Positive for fatigue. Negative for unexpected weight gain and unexpected weight loss.   Eyes: Negative.    Endocrine: Negative for polydipsia, polyphagia and polyuria.   Skin:  Positive for bruise.   Psychiatric/Behavioral:  Positive for sleep disturbance.    All other systems reviewed and are negative.    /90 (BP Location: Left arm, Patient Position: Sitting, Cuff Size: Large Adult)   Pulse 97   Wt 133 kg (293 lb 3.2 oz)   LMP 05/09/2018 (Approximate)   SpO2 98%   BMI 47.32 kg/m²      Physical Exam  Vitals reviewed.   Constitutional:       Appearance: Normal appearance.   Eyes:      Extraocular Movements: Extraocular movements intact.   Cardiovascular:      Rate and Rhythm: Tachycardia present.   Pulmonary:      Effort: Pulmonary effort is normal.   Skin:     General: Skin is warm and dry.   Neurological:      General: No focal deficit present.      Mental Status: She is alert and oriented to person, place, and time.   Psychiatric:         Mood and Affect: Mood normal.         Behavior: Behavior normal.         Thought Content: Thought content normal.         Judgment: Judgment normal.       CMP:  Lab Results   Component Value Date     (C) 08/14/2023    BUN 28 (H) 03/26/2024    CREATININE 1.40 (H) 03/29/2024    EGFRIFNONA 77 04/27/2019    BCR 20.7 03/26/2024     03/26/2024    K 5.2 03/26/2024    CO2 18.0 (L) 03/26/2024    CALCIUM 9.4 03/26/2024    ALBUMIN 4.3 03/26/2024    BILITOT 0.3 03/26/2024    ALKPHOS 138 (H) 03/26/2024    AST 15 03/26/2024    ALT 20 03/26/2024     Lipid Panel:  No results found for: \"CHOL\", \"TRIG\", \"HDL\", \"VLDL\", \"LDL\"  HbA1c:  Lab Results   Component Value Date    HGBA1C 7.9 (A) 03/29/2024    HGBA1C 7.2 (A) 10/31/2023     Glucose:  Lab Results   Component Value Date    POCGLU 200 (A) " 03/29/2024     Microalbumin:  Lab Results   Component Value Date    JEFF  04/04/2023      Comment:      Unable to calculate     TSH:  Lab Results   Component Value Date    TSH 0.708 09/27/2023       Assessment and Plan    Diagnoses and all orders for this visit:    1. 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  -Discussed dietary and exercise guidelines with patient.  -Discussed the importance of yearly eye exams.  -Discussed the importance of checking BG's regularly. Continue using Dexcom CGM.    2 week pump data download is as follows:  Very High: 21%  High: 58%  In Range: 21%  Low: 0%  Very Low: 0%  Trend shows overall hypers with post supper hypers.  -Continue with current pump settings:  Basal:   12A: 1.4 u/hr  3A: 1.55 u/hr  CR: 1:15  ISF: 23   Discussed importance of administering meal time boluses to prevent overt hypers with rebound hypos.  -Increase Ozempic 0.5 mg weekly.  Patient has no personal history of pancreatitis, no family history of MEN syndrome or medullary thyroid cancer. Possible side effects including nausea, bloating, other GI upset and rarely pancreatitis were discussed. She was advised to call the office with any symptoms or concerns.   -S/S hypoglycemia reviewed with Rule of 15's advised.  -Follow-up in 3 months.    Orders:  -     POC Glucose, Blood  -     POC Glycosylated Hemoglobin (Hb A1C)  -     Semaglutide,0.25 or 0.5MG/DOS, (Ozempic, 0.25 or 0.5 MG/DOSE,) 2 MG/3ML solution pen-injector; Inject 0.5 mg under the skin into the appropriate area as directed 1 (One) Time Per Week.  Dispense: 3 mL; Refill: 2                 Return in about 3 months (around 6/29/2024) for Follow-up appointment, A1C. The patient was instructed to contact the clinic with any interval questions or concerns.        This document has been electronically signed by ALIE Gonzalez  March 29, 2024 11:31 EDT    Endocrinology

## 2024-04-02 ENCOUNTER — OFFICE VISIT (OUTPATIENT)
Dept: PULMONOLOGY | Facility: CLINIC | Age: 49
End: 2024-04-02
Payer: MEDICAID

## 2024-04-02 VITALS
BODY MASS INDEX: 47.09 KG/M2 | TEMPERATURE: 97.9 F | WEIGHT: 293 LBS | SYSTOLIC BLOOD PRESSURE: 150 MMHG | RESPIRATION RATE: 18 BRPM | DIASTOLIC BLOOD PRESSURE: 100 MMHG | HEART RATE: 86 BPM | OXYGEN SATURATION: 93 % | HEIGHT: 66 IN

## 2024-04-02 DIAGNOSIS — R91.1 PULMONARY NODULE: ICD-10-CM

## 2024-04-02 DIAGNOSIS — J45.20 MILD INTERMITTENT ASTHMA WITHOUT COMPLICATION: Primary | ICD-10-CM

## 2024-04-02 DIAGNOSIS — E66.01 MORBID OBESITY WITH BMI OF 45.0-49.9, ADULT: ICD-10-CM

## 2024-04-02 DIAGNOSIS — G47.33 OSA (OBSTRUCTIVE SLEEP APNEA): ICD-10-CM

## 2024-04-02 RX ORDER — CYCLOBENZAPRINE HCL 5 MG
TABLET ORAL
COMMUNITY
Start: 2024-03-26

## 2024-04-02 RX ORDER — DAPAGLIFLOZIN 10 MG/1
TABLET, FILM COATED ORAL
COMMUNITY
Start: 2024-03-26

## 2024-04-02 RX ORDER — CETIRIZINE HYDROCHLORIDE 10 MG/1
TABLET ORAL
COMMUNITY
Start: 2024-03-26

## 2024-04-02 RX ORDER — DICLOFENAC SODIUM 75 MG/1
TABLET, DELAYED RELEASE ORAL
COMMUNITY
Start: 2024-03-21

## 2024-04-02 RX ORDER — ERGOCALCIFEROL 1.25 MG/1
CAPSULE ORAL
COMMUNITY
Start: 2024-03-09
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
## 2024-04-08 DIAGNOSIS — E13.9 LADA (LATENT AUTOIMMUNE DIABETES IN ADULTS), MANAGED AS TYPE 2: ICD-10-CM

## 2024-04-08 RX ORDER — BLOOD-GLUCOSE METER
1 KIT MISCELLANEOUS 4 TIMES DAILY
Qty: 250 EACH | Refills: 2 | Status: SHIPPED | OUTPATIENT
Start: 2024-04-08

## 2024-04-08 RX ORDER — SEMAGLUTIDE 0.68 MG/ML
0.5 INJECTION, SOLUTION SUBCUTANEOUS WEEKLY
Qty: 3 ML | Refills: 2 | Status: SHIPPED | OUTPATIENT
Start: 2024-04-08

## 2024-04-08 RX ORDER — PROCHLORPERAZINE 25 MG/1
1 SUPPOSITORY RECTAL TAKE AS DIRECTED
Qty: 1 EACH | Refills: 0 | Status: SHIPPED | OUTPATIENT
Start: 2024-04-08

## 2024-04-22 RX ORDER — PEN NEEDLE, DIABETIC 31 GX5/16"
NEEDLE, DISPOSABLE MISCELLANEOUS
Qty: 100 EACH | Refills: 5 | Status: SHIPPED | OUTPATIENT
Start: 2024-04-22

## 2024-04-29 RX ORDER — INSULIN ASPART 100 [IU]/ML
INJECTION, SOLUTION INTRAVENOUS; SUBCUTANEOUS
Qty: 30 ML | Refills: 3 | Status: SHIPPED | OUTPATIENT
Start: 2024-04-29

## 2024-06-03 RX ORDER — INSULIN GLARGINE 100 [IU]/ML
75 INJECTION, SOLUTION SUBCUTANEOUS NIGHTLY
Qty: 30 ML | Refills: 4 | Status: SHIPPED | OUTPATIENT
Start: 2024-06-03

## 2024-06-03 RX ORDER — INSULIN GLARGINE 100 [IU]/ML
INJECTION, SOLUTION SUBCUTANEOUS
Qty: 30 ML | Refills: 4 | Status: SHIPPED | OUTPATIENT
Start: 2024-06-03 | End: 2024-06-03 | Stop reason: SDUPTHER

## 2024-07-02 RX ORDER — ALBUTEROL SULFATE 90 UG/1
2 AEROSOL, METERED RESPIRATORY (INHALATION) EVERY 4 HOURS PRN
Qty: 18 G | Refills: 5 | Status: SHIPPED | OUTPATIENT
Start: 2024-07-02

## 2024-08-01 ENCOUNTER — OFFICE VISIT (OUTPATIENT)
Dept: ENDOCRINOLOGY | Facility: CLINIC | Age: 49
End: 2024-08-01
Payer: MEDICAID

## 2024-08-01 ENCOUNTER — SPECIALTY PHARMACY (OUTPATIENT)
Dept: PHARMACY | Facility: HOSPITAL | Age: 49
End: 2024-08-01
Payer: MEDICAID

## 2024-08-01 VITALS
HEART RATE: 101 BPM | BODY MASS INDEX: 47.86 KG/M2 | WEIGHT: 293 LBS | OXYGEN SATURATION: 97 % | SYSTOLIC BLOOD PRESSURE: 120 MMHG | DIASTOLIC BLOOD PRESSURE: 82 MMHG

## 2024-08-01 DIAGNOSIS — E13.9 LADA (LATENT AUTOIMMUNE DIABETES IN ADULTS), MANAGED AS TYPE 2: Primary | ICD-10-CM

## 2024-08-01 RX ORDER — SEMAGLUTIDE 1.34 MG/ML
1 INJECTION, SOLUTION SUBCUTANEOUS WEEKLY
Qty: 3 ML | Refills: 2 | Status: SHIPPED | OUTPATIENT
Start: 2024-08-01

## 2024-08-01 NOTE — PROGRESS NOTES
Specialty Pharmacy Patient Management Program  Endocrinology Reassessment     Marquita Vernon is a 49 y.o. female seen by an Endocrinology Provider for Type 2 Diabetes and enrolled in the Endocrinology Patient Management program offered by [facility].  A follow-up outreach was conducted, including assessment of continued therapy appropriateness, medication adherence, and side effect incidence and management for Ozempic, insulin therapy, and CGM.    Patient currently takes Farxiga 10 mg daily, Ozempic 1 mg weekly and is using Novolog in her Omniopod 5. She uses a Dexcom G6 to monitor her BG and reports average BG around 200-250. She denies any hypoglyemic episodes. She denies any issues with pump or CGM at this time.    Patient denies personal/family history of thyroid cancer, reports history of pancreatitis (2018, patient denies any issues since being on Ozempic), and denies issues with recurrent UTI/yeast infections.      In the past, the patient has not tried any other diabetes medications.     Changes to Insurance Coverage or Financial Support  [employer]      Relevant Past Medical History and Comorbidities  Relevant medical history and concomitant health conditions were discussed with the patient. The patient's chart has been reviewed for relevant past medical history and comorbid health conditions and updated as necessary.   Past Medical History:   Diagnosis Date    Abnormal ECG     Arthritis     Asthma     Asthma, extrinsic 1979    Diabetes mellitus     GERD (gastroesophageal reflux disease)     IBS (irritable bowel syndrome)     Lung nodule 12/24/23    Pancreatitis 2017    Scoliosis      Social History     Socioeconomic History    Marital status: Single   Tobacco Use    Smoking status: Never     Passive exposure: Current    Smokeless tobacco: Never   Vaping Use    Vaping status: Never Used   Substance and Sexual Activity    Alcohol use: No    Drug use: No    Sexual activity: Not  "Currently     Partners: Male     Birth control/protection: Post-menopausal, Tubal ligation, Surgical       Problem list reviewed by Laura Luis RPH on 8/1/2024 at  8:33 AM    Hospitalizations and Urgent Care Since Last Assessment  ED Visits, Admissions or Hospitalizations : 2 - DKA  Urgent Office Visits: None    Allergies  Known allergies and reactions were discussed with the patient. The patient's chart has been reviewed for allergy information and updated as necessary.   Allergies   Allergen Reactions    Ibuprofen Hives and Other (See Comments)     \"smiley\"    Latex Hives    Vaseline [Petrolatum] Hives       Allergies reviewed by Laura Luis RPH on 8/1/2024 at  8:30 AM    Relevant Laboratory Values  A1C Last 3 Results          8/13/2023    15:23 8/13/2023    21:43 10/31/2023    13:50 3/29/2024    09:50   HGBA1C Last 3 Results   Hemoglobin A1C 7.70  7.5     7.2  7.9       Details          This result is from an external source.             Lab Results   Component Value Date    HGBA1C 7.9 (A) 03/29/2024     Lab Results   Component Value Date    GLUCOSE 330 (H) 03/26/2024    CALCIUM 9.4 03/26/2024     03/26/2024    K 5.2 03/26/2024    CO2 18.0 (L) 03/26/2024     03/26/2024    BUN 28 (H) 03/26/2024    CREATININE 1.40 (H) 03/29/2024    EGFRIFNONA 77 04/27/2019    BCR 20.7 03/26/2024    ANIONGAP 13.0 03/26/2024     No results found for: \"CHOL\", \"CHLPL\", \"TRIG\", \"HDL\", \"LDL\", \"LDLDIRECT\"        Current Medication List  This medication list has been reviewed with the patient and evaluated for any interactions or necessary modifications/recommendations, and updated to include all prescription medications, OTC medications, and supplements the patient is currently taking. This list reflects what is contained in the patient's profile, which has also been marked as reviewed to communicate to other providers it is the most up to date version of the patient's current medication therapy.     Current " Outpatient Medications:     cetirizine (zyrTEC) 10 MG tablet, , Disp: , Rfl:     Continuous Blood Gluc  (Dexcom G6 ) device, Use 1 each Take As Directed., Disp: 1 each, Rfl: 0    Continuous Blood Gluc Sensor (Dexcom G6 Sensor), USE AS DIRECTED PER PACKAGE INSTRUCTIONS. *CHANGE SENSOR EVERY 10 DAYS*, Disp: 3 each, Rfl: 11    Continuous Blood Gluc Transmit (Dexcom G6 Transmitter) misc, Change Every 3 (Three) Months., Disp: 1 each, Rfl: 3    cyclobenzaprine (FLEXERIL) 5 MG tablet, Take 1 tablet by mouth 3 (Three) Times a Day As Needed for Muscle Spasms., Disp: , Rfl:     diclofenac (VOLTAREN) 75 MG EC tablet, , Disp: , Rfl:     Farxiga 10 MG tablet, , Disp: , Rfl:     gabapentin (NEURONTIN) 400 MG capsule, Take 1 capsule by mouth 3 (Three) Times a Day As Needed (Pain)., Disp: , Rfl:     Glucagon HCl (Glucagon Emergency) 1 MG/ML reconstituted solution, INJECT 1 ML AS DIRECTED AS NEEDED (SEVERE HYPOGLYCEMIA)., Disp: 1 each, Rfl: 4    glucose 5 g chewable tablet, Chew 3 tablets As Needed for Low Blood Sugar., Disp: 50 tablet, Rfl: 12    glucose blood (FREESTYLE LITE) test strip, 1 each by Other route 4 (Four) Times a Day., Disp: 250 each, Rfl: 2    HYDROcodone-acetaminophen (NORCO) 5-325 MG per tablet, Take 1 tablet by mouth Every 8 (Eight) Hours As Needed. Only takes PRN, Disp: , Rfl:     Insulin Aspart (novoLOG) 100 UNIT/ML injection, FOR USE IN INSULIN PUMP. MAX DAILY DOSE  UNITS PER DAY, Disp: 30 mL, Rfl: 3    Insulin Disposable Pump (Omnipod 5 G6 Intro, Gen 5,) kit, USE AS DIRECTED. CHANGE EACH POD EVERY 72 HOURS., Disp: 1 kit, Rfl: 0    Insulin Disposable Pump (Omnipod 5 G6 Pod, Gen 5,) misc, Use Every Other Day as directed., Disp: 15 each, Rfl: 3    Insulin Pen Needle (B-D ULTRAFINE III SHORT PEN) 31G X 8 MM misc, USE AS DIRECTED TO INJECT INSULIN FOUR TIMES DAILY, Disp: 100 each, Rfl: 5    lactobacillus acidophilus (RISAQUAD) capsule capsule, Take 1 capsule by mouth Daily., Disp: 90 capsule,  Rfl: 3    Lancets (freestyle) lancets, USE 1 LANCET TO CHECK BLOOD GLUCOSE LEVELS FOUR TIMES DAILY AS DIRECTED, Disp: 100 each, Rfl: 3    multivitamin (THERAGRAN) tablet tablet, Take 1 tablet by mouth Daily., Disp: , Rfl:     omeprazole (priLOSEC) 20 MG capsule, Take 2 capsules by mouth 2 (Two) Times a Day., Disp: 30 capsule, Rfl: 0    PARoxetine (PAXIL) 10 MG tablet, Take 1 tablet by mouth Every Morning., Disp: , Rfl:     Ventolin  (90 Base) MCG/ACT inhaler, INHALE 2 PUFFS BY MOUTH EVERY 4 HOURS AS NEEDED FOR WHEEZING, Disp: 18 g, Rfl: 5    vitamin D (ERGOCALCIFEROL) 1.25 MG (87357 UT) capsule capsule, Take 1 capsule by mouth Every 7 (Seven) Days. Saturdays, Disp: , Rfl:     Semaglutide, 1 MG/DOSE, (Ozempic, 1 MG/DOSE,) 4 MG/3ML solution pen-injector, Inject 1 mg under the skin into the appropriate area as directed 1 (One) Time Per Week., Disp: 3 mL, Rfl: 2    Medicines reviewed by Laura Luis RPH on 8/1/2024 at  8:32 AM  Medicines reviewed by Laura Luis RPH on 8/1/2024 at  8:33 AM    Drug Interactions  Paroxetine + Jardiance, Novolog, Lantus and Ozempic: Selective Serotonin Reuptake Inhibitors may enhance the hypoglycemic effect of Agents with Blood Glucose Lowering Effects.      Metoprolol + Jardiance, Novolog, Lantus and Ozempic: Beta-Blockers (Beta1 Selective) may enhance the hypoglycemic effect of Antidiabetic Agents    Recommended Medications Assessment  Aspirin: Not Taking Currently  Statin: Not Taking Currently  ACEi/ARB: Not Taking Currently       Adverse Drug Reactions  Adverse Reactions Experienced: none          Plan for ADR Management: none needed at this time      Adherence, Self-Administration, and Current Therapy Problems  Adherence related to the patient's specialty therapy was discussed with the patient.  New or Ongoing Barriers to Patient Adherence and/or Self-Administration: none  Methods for Supporting Patient Adherence and/or Self-Administration: none needed at this  time    Goals of Therapy  Goals related to the patient's specialty therapy was discussed with the patient. The Patient Goals segment of this outreach has been reviewed and updated.    Goals Addressed Today        Consistently take medications as prescribed              Reassessment Plan & Follow-Up  Patient's diabetes remains uncontrolled with A1C up to 13.2% up from 7.9%.  Medication Therapy Changes per ALIE Gonzalez: None discussed with patient  Related Plans, Therapy Recommendations, or Issues to Be Addressed:   Patient may benefit from insulin pump setting adjustments to help improve BG control.    Patient does not wish to use [facility] services at this time due to: loyalty to independent pharmacy    Attestation  I attest the patient was actively involved in and has agreed to the above plan of care. If the prescribed therapy is at any point deemed not appropriate based on the current or future assessments, a consultation will be initiated with the patient's specialty care provider to determine the best course of action. The revised plan of therapy will be documented along with any required assessments and/or additional patient education provided.    Laura Luis, PharmD  8/1/2024  15:28 EDT

## 2024-08-01 NOTE — ASSESSMENT & PLAN NOTE
-Diabetes is above goal with A1c 8.1.  -Discussed dietary and exercise guidelines with patient.  -Discussed the importance of yearly eye exams.  -Discussed the importance of checking BG's regularly. Continue using Dexcom CGM.    1 week pump data download is as follows:  Very High: 27%  High: 54%  In Range: 19%  Low: 0%  Very Low: 0%  Trend shows overall hypers with post supper hypers.  -Continue with current pump settings:  Basal:   12A: 1.4 u/hr  3A: 1.55 u/hr  CR: 1:15  ISF: 23   Discussed importance of administering meal time boluses to prevent overt hypers with rebound hypos.  -Increase Ozempic 1 mg weekly.  Patient has no personal history of pancreatitis, no family history of MEN syndrome or medullary thyroid cancer. Possible side effects including nausea, bloating, other GI upset and rarely pancreatitis were discussed. She was advised to call the office with any symptoms or concerns.   -S/S hypoglycemia reviewed with Rule of 15's advised.  -Follow-up in 3 months.

## 2024-08-01 NOTE — PROGRESS NOTES
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 provider found     HPI   Marquita Vernon is a 49 y.o. 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    T2DM.    She has noted increased BG's recently.  She reports that she is tolerating her medication well.  She reports that she was without an insulin pump pod for about 3 days and feels like she is still trying to get it under control from that.  She reports that she has had it back on for the last week or so.    Diabetes:  Diabetes was diagnosed 2009.  Complications include neuropathy.  Last ophtho exam was Feb 2022, [facility].  Current medications for diabetes include Novolog in an Omnipod insulin pump, Ozempic 0.5 mg weekly.  Past meds: Metformin-GI issues, Januvia-insurance issue  She checks her blood sugar with Dexcom CGM.  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  She has been noting improving BG's recently without hypos.  Current pump settings:  Basal:   12A: 1 u/hr  CR: 1:150  ISF: 23     She has recently switched PDM and entered her settings in.    ACE/ARB:no, Statin: no  Labs:  A1C:8.3 (5/2022)    The following portions of the patient's history were reviewed and updated as appropriate: allergies, current medications, past family history, past medical history, past social history, past surgical history and problem list.    Diet:   Breakfast: doesn't typically eat  Lunch: salad or left overs  Dinner: veggies, meat,   Drinks: diet mtn dew, milk  Snacks: popcorn    Past Medical History:   Diagnosis Date    Abnormal ECG     Arthritis     Asthma     Asthma, extrinsic 1979    Diabetes mellitus     GERD (gastroesophageal reflux disease)     IBS (irritable bowel syndrome)     Lung nodule 12/24/23     "Pancreatitis 2017    Scoliosis      Past Surgical History:   Procedure Laterality Date    CARDIOVASCULAR STRESS TEST  11/27/2018    L. Cardiolite- Unable to walk. EF 69%. Negative.    CHOLECYSTECTOMY      COLONOSCOPY      COLONOSCOPY N/A 9/21/2018    Procedure: COLONOSCOPY CPT CODE: 76337;  Surgeon: Micah Swartz MD;  Location: [facility];  Service: Gastroenterology    COLONOSCOPY N/A 1/30/2024    Procedure: COLONOSCOPY FOR SCREENING;  Surgeon: Rosita Boyd MD;  Location: [facility];  Service: Gastroenterology;  Laterality: N/A;    CYST REMOVAL      ECHO - CONVERTED  11/27/2018    EF 65%. No AS. Mild MR    ENDOSCOPY      ENDOSCOPY N/A 9/21/2018    Procedure: ESOPHAGOGASTRODUODENOSCOPY WITH BIOPSY CPT CODE: 95590;  Surgeon: Micah Swartz MD;  Location: [facility];  Service: Gastroenterology    OTHER SURGICAL HISTORY  01/02/2019    Event monitor- baseline sinus, one 39 beat SVT, no V-tach, no AFib, no pauses    TUBAL ABDOMINAL LIGATION      UPPER GASTROINTESTINAL ENDOSCOPY  2015 2018      Family History   Problem Relation Age of Onset    Diabetes Other     Cancer Other     Lung cancer Other     Heart attack Father         MI in his late 40's    Hypertension Father     No Known Problems Sister     Diabetes Maternal Grandmother       Social History     Socioeconomic History    Marital status: Single   Tobacco Use    Smoking status: Never     Passive exposure: Current    Smokeless tobacco: Never   Vaping Use    Vaping status: Never Used   Substance and Sexual Activity    Alcohol use: No    Drug use: No    Sexual activity: Not Currently     Partners: Male     Birth control/protection: Post-menopausal, Tubal ligation, Surgical      Allergies   Allergen Reactions    Ibuprofen Hives and Other (See Comments)     \"smiley\"    Latex Hives    Vaseline [Petrolatum] Hives      Current Outpatient Medications on File Prior to Visit   Medication Sig Dispense Refill    cetirizine (zyrTEC) 10 MG tablet       " Continuous Blood Gluc  (Dexcom G6 ) device Use 1 each Take As Directed. 1 each 0    Continuous Blood Gluc Sensor (Dexcom G6 Sensor) USE AS DIRECTED PER PACKAGE INSTRUCTIONS. *CHANGE SENSOR EVERY 10 DAYS* 3 each 11    Continuous Blood Gluc Transmit (Dexcom G6 Transmitter) misc Change Every 3 (Three) Months. 1 each 3    cyclobenzaprine (FLEXERIL) 5 MG tablet Take 1 tablet by mouth 3 (Three) Times a Day As Needed for Muscle Spasms.      diclofenac (VOLTAREN) 75 MG EC tablet       Farxiga 10 MG tablet       gabapentin (NEURONTIN) 400 MG capsule Take 1 capsule by mouth 3 (Three) Times a Day As Needed (Pain).      Glucagon HCl (Glucagon Emergency) 1 MG/ML reconstituted solution INJECT 1 ML AS DIRECTED AS NEEDED (SEVERE HYPOGLYCEMIA). 1 each 4    glucose 5 g chewable tablet Chew 3 tablets As Needed for Low Blood Sugar. 50 tablet 12    glucose blood (FREESTYLE LITE) test strip 1 each by Other route 4 (Four) Times a Day. 250 each 2    HYDROcodone-acetaminophen (NORCO) 5-325 MG per tablet Take 1 tablet by mouth Every 8 (Eight) Hours As Needed. Only takes PRN      Insulin Aspart (novoLOG) 100 UNIT/ML injection FOR USE IN INSULIN PUMP. MAX DAILY DOSE  UNITS PER DAY 30 mL 3    Insulin Disposable Pump (Omnipod 5 G6 Intro, Gen 5,) kit USE AS DIRECTED. CHANGE EACH POD EVERY 72 HOURS. 1 kit 0    Insulin Disposable Pump (Omnipod 5 G6 Pod, Gen 5,) misc Use Every Other Day as directed. 15 each 3    Insulin Pen Needle (B-D ULTRAFINE III SHORT PEN) 31G X 8 MM misc USE AS DIRECTED TO INJECT INSULIN FOUR TIMES DAILY 100 each 5    lactobacillus acidophilus (RISAQUAD) capsule capsule Take 1 capsule by mouth Daily. 90 capsule 3    Lancets (freestyle) lancets USE 1 LANCET TO CHECK BLOOD GLUCOSE LEVELS FOUR TIMES DAILY AS DIRECTED 100 each 3    multivitamin (THERAGRAN) tablet tablet Take 1 tablet by mouth Daily.      omeprazole (priLOSEC) 20 MG capsule Take 2 capsules by mouth 2 (Two) Times a Day. 30 capsule 0     PARoxetine (PAXIL) 10 MG tablet Take 1 tablet by mouth Every Morning.      Ventolin  (90 Base) MCG/ACT inhaler INHALE 2 PUFFS BY MOUTH EVERY 4 HOURS AS NEEDED FOR WHEEZING 18 g 5    vitamin D (ERGOCALCIFEROL) 1.25 MG (69569 UT) capsule capsule Take 1 capsule by mouth Every 7 (Seven) Days. Saturdays      [DISCONTINUED] Insulin Glargine (Lantus SoloStar) 100 UNIT/ML injection pen Inject 75 Units under the skin into the appropriate area as directed Every Night. 30 mL 4    [DISCONTINUED] Loratadine 10 MG capsule Take 1 capsule by mouth Every Night.      [DISCONTINUED] Semaglutide,0.25 or 0.5MG/DOS, (Ozempic, 0.25 or 0.5 MG/DOSE,) 2 MG/3ML solution pen-injector Inject 0.5 mg under the skin into the appropriate area as directed 1 (One) Time Per Week. (Patient taking differently: Inject 0.5 mg under the skin into the appropriate area as directed 1 (One) Time Per Week. Saturdays) 3 mL 2     No current facility-administered medications on file prior to visit.        Review of Systems   Constitutional:  Positive for fatigue. Negative for unexpected weight gain and unexpected weight loss.   Eyes: Negative.    Endocrine: Negative for polydipsia, polyphagia and polyuria.   Skin:  Positive for bruise.   Psychiatric/Behavioral:  Positive for sleep disturbance.    All other systems reviewed and are negative.    /82 (BP Location: Right arm, Patient Position: Sitting, Cuff Size: Adult)   Pulse 101   Wt 134 kg (296 lb 6.4 oz)   LMP 05/09/2018 (Approximate)   SpO2 97%   BMI 47.86 kg/m²      Physical Exam  Vitals reviewed.   Constitutional:       Appearance: Normal appearance.   Eyes:      Extraocular Movements: Extraocular movements intact.   Cardiovascular:      Rate and Rhythm: Tachycardia present.   Pulmonary:      Effort: Pulmonary effort is normal.   Skin:     General: Skin is warm and dry.   Neurological:      General: No focal deficit present.      Mental Status: She is alert and oriented to person, place, and  "time.   Psychiatric:         Mood and Affect: Mood normal.         Behavior: Behavior normal.         Thought Content: Thought content normal.         Judgment: Judgment normal.       CMP:  Lab Results   Component Value Date     (C) 08/14/2023    BUN 28 (H) 03/26/2024    CREATININE 1.40 (H) 03/29/2024    EGFRIFNONA 77 04/27/2019    BCR 20.7 03/26/2024     03/26/2024    K 5.2 03/26/2024    CO2 18.0 (L) 03/26/2024    CALCIUM 9.4 03/26/2024    ALBUMIN 4.3 03/26/2024    BILITOT 0.3 03/26/2024    ALKPHOS 138 (H) 03/26/2024    AST 15 03/26/2024    ALT 20 03/26/2024     Lipid Panel:  No results found for: \"CHOL\", \"TRIG\", \"HDL\", \"VLDL\", \"LDL\"  HbA1c:  Lab Results   Component Value Date    HGBA1C 7.9 (A) 03/29/2024    HGBA1C 7.2 (A) 10/31/2023   06/2024: 8.1 (at PCP)    Glucose:  Lab Results   Component Value Date    POCGLU 200 (A) 03/29/2024     Microalbumin:  Lab Results   Component Value Date    MALBCRERATIO  04/04/2023      Comment:      Unable to calculate     TSH:  Lab Results   Component Value Date    TSH 0.708 09/27/2023       Assessment and Plan    Diagnoses and all orders for this visit:    1. 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  -Discussed dietary and exercise guidelines with patient.  -Discussed the importance of yearly eye exams.  -Discussed the importance of checking BG's regularly. Continue using Dexcom CGM.    1 week pump data download is as follows:  Very High: 27%  High: 54%  In Range: 19%  Low: 0%  Very Low: 0%  Trend shows overall hypers with post supper hypers.  -Continue with current pump settings:  Basal:   12A: 1.4 u/hr  3A: 1.55 u/hr  CR: 1:15  ISF: 23   Discussed importance of administering meal time boluses to prevent overt hypers with rebound hypos.  -Increase Ozempic 1 mg weekly.  Patient has no personal history of pancreatitis, no family history of MEN syndrome or medullary thyroid " cancer. Possible side effects including nausea, bloating, other GI upset and rarely pancreatitis were discussed. She was advised to call the office with any symptoms or concerns.   -S/S hypoglycemia reviewed with Rule of 15's advised.  -Follow-up in 3 months.      Other orders  -     Semaglutide, 1 MG/DOSE, (Ozempic, 1 MG/DOSE,) 4 MG/3ML solution pen-injector; Inject 1 mg under the skin into the appropriate area as directed 1 (One) Time Per Week.  Dispense: 3 mL; Refill: 2                   Return in about 3 months (around 11/1/2024) for Follow-up appointment, A1C. The patient was instructed to contact the clinic with any interval questions or concerns.        This document has been electronically signed by ALIE Gonzalez  August 1, 2024 08:56 EDT   Endocrinology    Please note that portions of this document were completed with a voice recognition program. Efforts were made to edit the dictations, but occasionally words are mis-transcribed.

## 2024-08-18 NOTE — TELEPHONE ENCOUNTER
Patient's vitals are within normal parameters.   She reports intermittent uterine cramping that radiates to her right hip. PRN oral pain medication given in addition to aqua K heating pad and repositioning. Patient reports this has been helpful.  Patient has not called for feeding assistance. RN/writer continues to offer assistance with breastfeeding and encourage pumping/hand expression with each bottle.   Lochia appropriate and fundus firm, midline, and 1 below umbilicus.      Goal Outcome Evaluation:      Plan of Care Reviewed With: patient    Overall Patient Progress: improvingOverall Patient Progress: improving       Dannielle Mercedes RN       Regarding: Prescription Question  Contact: [phone]  ----- Message from Mychart, Generic sent at 5/23/2019  3:49 PM EDT -----    I saw on tv a new probolic that works of a night where ppl can sleep an I a candidate for it.

## 2024-08-19 RX ORDER — BLOOD-GLUCOSE METER
1 KIT MISCELLANEOUS 4 TIMES DAILY
Qty: 250 EACH | Refills: 2 | Status: SHIPPED | OUTPATIENT
Start: 2024-08-19

## 2024-08-19 RX ORDER — PROCHLORPERAZINE 25 MG/1
1 SUPPOSITORY RECTAL TAKE AS DIRECTED
Qty: 1 EACH | Refills: 0 | Status: SHIPPED | OUTPATIENT
Start: 2024-08-19

## 2024-08-19 RX ORDER — SEMAGLUTIDE 1.34 MG/ML
1 INJECTION, SOLUTION SUBCUTANEOUS WEEKLY
Qty: 3 ML | Refills: 2 | Status: SHIPPED | OUTPATIENT
Start: 2024-08-19

## 2024-08-29 RX ORDER — PROCHLORPERAZINE 25 MG/1
SUPPOSITORY RECTAL
Qty: 3 EACH | Refills: 3 | Status: SHIPPED | OUTPATIENT
Start: 2024-08-29

## 2024-09-16 RX ORDER — SEMAGLUTIDE 1.34 MG/ML
1 INJECTION, SOLUTION SUBCUTANEOUS WEEKLY
Qty: 3 ML | Refills: 2 | Status: SHIPPED | OUTPATIENT
Start: 2024-09-16

## 2024-09-16 RX ORDER — INSULIN ASPART 100 [IU]/ML
INJECTION, SOLUTION INTRAVENOUS; SUBCUTANEOUS
Qty: 30 ML | Refills: 3 | Status: SHIPPED | OUTPATIENT
Start: 2024-09-16

## 2024-10-07 ENCOUNTER — HOSPITAL ENCOUNTER (OUTPATIENT)
Dept: CT IMAGING | Facility: HOSPITAL | Age: 49
Discharge: HOME | End: 2024-10-07
Admitting: INTERNAL MEDICINE
Payer: MEDICAID

## 2024-10-07 DIAGNOSIS — R91.1 PULMONARY NODULE: ICD-10-CM

## 2024-10-07 PROCEDURE — 71250 CT THORAX DX C-: CPT

## 2024-10-09 ENCOUNTER — OFFICE VISIT (OUTPATIENT)
Dept: PULMONOLOGY | Facility: CLINIC | Age: 49
End: 2024-10-09
Payer: MEDICAID

## 2024-10-09 VITALS
HEART RATE: 98 BPM | BODY MASS INDEX: 47.09 KG/M2 | WEIGHT: 293 LBS | OXYGEN SATURATION: 95 % | TEMPERATURE: 98.3 F | DIASTOLIC BLOOD PRESSURE: 88 MMHG | SYSTOLIC BLOOD PRESSURE: 142 MMHG | HEIGHT: 66 IN

## 2024-10-09 DIAGNOSIS — E66.01 MORBID OBESITY WITH BMI OF 45.0-49.9, ADULT: ICD-10-CM

## 2024-10-09 DIAGNOSIS — R91.1 PULMONARY NODULE: ICD-10-CM

## 2024-10-09 DIAGNOSIS — J45.20 MILD INTERMITTENT ASTHMA WITHOUT COMPLICATION: Primary | ICD-10-CM

## 2024-10-09 DIAGNOSIS — G47.33 OSA (OBSTRUCTIVE SLEEP APNEA): ICD-10-CM

## 2024-10-09 RX ORDER — PAROXETINE 40 MG/1
TABLET, FILM COATED ORAL EVERY 24 HOURS
COMMUNITY

## 2024-10-09 RX ORDER — FLUTICASONE PROPIONATE AND SALMETEROL XINAFOATE 115; 21 UG/1; UG/1
2 AEROSOL, METERED RESPIRATORY (INHALATION)
Qty: 1 EACH | Refills: 5 | Status: SHIPPED | OUTPATIENT
Start: 2024-10-09

## 2024-10-09 RX ORDER — ALBUTEROL SULFATE 90 UG/1
2 INHALANT RESPIRATORY (INHALATION) EVERY 4 HOURS PRN
Qty: 18 G | Refills: 5 | Status: SHIPPED | OUTPATIENT
Start: 2024-10-09

## 2024-10-09 RX ORDER — GABAPENTIN 600 MG/1
TABLET ORAL
COMMUNITY
Start: 2024-10-03

## 2024-10-09 RX ORDER — FLUTICASONE PROPIONATE AND SALMETEROL XINAFOATE 115; 21 UG/1; UG/1
AEROSOL, METERED RESPIRATORY (INHALATION)
COMMUNITY
Start: 2024-09-21 | End: 2024-10-09 | Stop reason: SDUPTHER

## 2024-10-09 RX ORDER — LEVOCETIRIZINE DIHYDROCHLORIDE 5 MG/1
TABLET, FILM COATED ORAL
COMMUNITY
Start: 2024-08-24
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
## 2024-11-13 RX ORDER — ACYCLOVIR 400 MG/1
1 TABLET ORAL
Qty: 3 EACH | Refills: 5 | Status: SHIPPED | OUTPATIENT
Start: 2024-11-13

## 2024-11-14 RX ORDER — GLUCAGON 1 MG
1 KIT INJECTION AS NEEDED
Qty: 1 EACH | Refills: 4 | Status: SHIPPED | OUTPATIENT
Start: 2024-11-14 | End: 2024-11-15 | Stop reason: SDUPTHER

## 2024-11-14 RX ORDER — LANCETS 28 GAUGE
EACH MISCELLANEOUS
Qty: 100 EACH | Refills: 3 | Status: SHIPPED | OUTPATIENT
Start: 2024-11-14 | End: 2024-11-15 | Stop reason: SDUPTHER

## 2024-11-14 NOTE — PROGRESS NOTES
Chief Complaint   Patient presents with   • Diarrhea   • Abdominal Pain       Marquita Vernon is a 43 y.o. female who presents to the office today for follow up appointment regarding Diarrhea and Abdominal Pain.    HPI  Since her last office visit, she is feeling some better. Diarrhea has decreased in frequency. She has 1 stool daily which is loose now. She took Flagyl as directed for 10 days and that seemed to help with her diarrhea. Abdominal pain is still present, generalized and sporadic which can be very severe. She had diarrhea all day approx 1 week ago but it seems to be gradually improving. She was not able to take H pylori breath test after last was positive. She is currently taking the Prilosec 20 mg twice daily for GERD.    9/1/2018 stool studies:  Occult blood positive  C diff negative  Stool culture normal  Pancreatic elastase could not be completed because stool too watery     EGD/colonoscopy was performed in 2013 and she was diagnosed with H. Pylori and completed the treatment at that time. There is also history of pancreatitis.  Takes ultram as needed and has uncontrolled DM type 2.       Abdominal film 12/2/2017:  IMPRESSION:  Nonobstructive bowel gas pattern.     CT abd/pelv 8/9/2018:  Impression:  1. QUESTION MILD ENTERITIS BOWEL GAS PATTERN. NO BOWEL OBSTRUCTION OR  INFLAMMATORY CHANGES OF THE GI TRACT.  2. OTHERWISE NO ACUTE FINDINGS IDENTIFIED WITHIN ABDOMEN OR PELVIS.  SPECIFICALLY, THE APPENDIX IS NONINFLAMED.  3. SMALL SLIDING HIATAL HERNIA. FAT ONLY CONTAINING ANTERIOR ABDOMINAL  WALL HERNIA.  4. OTHER NONACUTE/INCIDENTAL FINDINGS AS ABOVE.     Review of Systems   Constitutional: Positive for fatigue.   HENT: Negative for trouble swallowing.    Eyes: Negative.    Respiratory: Negative.    Cardiovascular: Negative for chest pain.   Gastrointestinal: Positive for abdominal distention, abdominal pain, diarrhea, nausea and vomiting. Negative for anal bleeding, blood in stool, constipation and rectal  "pain.   Endocrine: Negative for heat intolerance.   Genitourinary: Negative.    Musculoskeletal: Positive for arthralgias, back pain and myalgias.   Skin: Negative.    Allergic/Immunologic: Positive for environmental allergies. Negative for food allergies.   Neurological: Negative for dizziness and light-headedness.   Hematological: Bruises/bleeds easily.   Psychiatric/Behavioral: The patient is not nervous/anxious.      Past Medical History:   Diagnosis Date   • Asthma    • Diabetes mellitus (CMS/HCC)    • IBS (irritable bowel syndrome)    • Scoliosis      Past Surgical History:   Procedure Laterality Date   • CHOLECYSTECTOMY     • COLONOSCOPY     • CYST REMOVAL     • ENDOSCOPY     • TUBAL ABDOMINAL LIGATION       Family History   Problem Relation Age of Onset   • Diabetes Other    • Cancer Other    • Lung cancer Other      Social History   Substance Use Topics   • Smoking status: Never Smoker   • Smokeless tobacco: Never Used   • Alcohol use No       CURRENT MEDICATION:  •  Cholecalciferol (VITAMIN D3) 5000 units capsule capsule, Take 5,000 Units by mouth Daily., Disp: , Rfl:   •  insulin aspart (novoLOG FLEXPEN) 100 UNIT/ML solution pen-injector sc pen, Inject  under the skin 3 (Three) Times a Day With Meals., Disp: , Rfl:   •  insulin glargine (LANTUS) 100 UNIT/ML injection, Inject  under the skin Daily., Disp: , Rfl:   •  montelukast (SINGULAIR) 10 MG tablet, Take 10 mg by mouth Every Night., Disp: , Rfl:   •  omeprazole (priLOSEC) 20 MG capsule, Take 1 capsule by mouth 2 (Two) Times a Day Before Meals., Disp: 20 capsule, Rfl: 0  •  PARoxetine (PAXIL) 10 MG tablet, Take 10 mg by mouth Every Morning., Disp: , Rfl:   •  traMADol (ULTRAM) 50 MG tablet, Take 50 mg by mouth Every 12 (Twelve) Hours., Disp: , Rfl:     ALLERGIES:  Ibuprofen and Latex    VISIT VITALS:  /90   Pulse 90   Ht 170.2 cm (67\")   Wt 110 kg (243 lb)   SpO2 94%   BMI 38.06 kg/m²     Physical Exam   Constitutional: She is oriented to " person, place, and time. She appears well-developed and well-nourished. No distress.   HENT:   Head: Normocephalic and atraumatic.   Nose: Nose normal.   Mouth/Throat: Oropharynx is clear and moist.   Eyes: Conjunctivae are normal. Right eye exhibits no discharge. Left eye exhibits no discharge. No scleral icterus.   Neck: Normal range of motion. No JVD present.   Cardiovascular: Normal rate, regular rhythm and normal heart sounds.  Exam reveals no gallop and no friction rub.    No murmur heard.  Pulmonary/Chest: Effort normal and breath sounds normal. No respiratory distress. She has no wheezes. She has no rales. She exhibits no tenderness.   Abdominal: Soft. Bowel sounds are normal. She exhibits no mass. There is tenderness (generalized, moderate).   Musculoskeletal: Normal range of motion. She exhibits no edema or deformity.   Neurological: She is alert and oriented to person, place, and time. Coordination normal.   Skin: Skin is warm and dry. No rash noted. She is not diaphoretic. No erythema.   Psychiatric: She has a normal mood and affect. Her behavior is normal. Judgment and thought content normal.   Vitals reviewed.      Assessment/Plan     1. Diarrhea, unspecified type    2. Generalized abdominal pain    3. Positive fecal occult blood test    4. Abnormal CT scan    5. History of Helicobacter pylori infection    6. Gastroesophageal reflux disease, esophagitis presence not specified      Orders Placed This Encounter   Procedures   • H. Pylori Breath Test - , Lung   • Follow Anesthesia Guidelines / Standing Orders     ESOPHAGOGASTRODUODENOSCOPY WITH BIOPSY CPT CODE: 08479 (N/A) COLONOSCOPY  She will need an esophagogastroduodenoscopy and colonoscopy performed with IV general sedation. All of the risks, benefits and alternatives of these procedures have been discussed with her, all of her questions have been answered and she has elected to proceed. She should follow up in the office after these procedures to  discuss the results and further recommendations can be made at that time.    New Medications Ordered This Visit   Medications   • raNITIdine (ZANTAC) 300 MG tablet     Sig: Take 1 tablet by mouth 2 (Two) Times a Day.     Dispense:  28 tablet     Refill:  0   • polyethylene glycol (GoLYTELY) 236 g solution     Sig: Starting at 6pm the day before procedure, drink 8 ounces every 30 minutes until all gone or stools are clear. May add flavor packet.     Dispense:  4000 mL     Refill:  0     H pylori breath test will be completed in 2 weeks. She will hold PPI during this time and take Zantac instead.             Return for follow up after procedure to discuss results.      Electronically signed 9/7/2018 1:36 PM  Dianna Walters PA-C, [facility]       2024

## 2024-11-15 RX ORDER — LANCETS 28 GAUGE
EACH MISCELLANEOUS
Qty: 100 EACH | Refills: 3 | Status: SHIPPED | OUTPATIENT
Start: 2024-11-15

## 2024-11-15 RX ORDER — GLUCAGON 1 MG
1 KIT INJECTION AS NEEDED
Qty: 1 EACH | Refills: 4 | Status: SHIPPED | OUTPATIENT
Start: 2024-11-15

## 2024-11-19 RX ORDER — BLOOD-GLUCOSE METER
1 KIT MISCELLANEOUS 4 TIMES DAILY
Qty: 250 EACH | Refills: 2 | Status: SHIPPED | OUTPATIENT
Start: 2024-11-19

## 2024-11-19 RX ORDER — SEMAGLUTIDE 1.34 MG/ML
1 INJECTION, SOLUTION SUBCUTANEOUS WEEKLY
Qty: 3 ML | Refills: 2 | Status: SHIPPED | OUTPATIENT
Start: 2024-11-19

## 2024-12-09 RX ORDER — ACYCLOVIR 400 MG/1
1 TABLET ORAL
Qty: 3 EACH | Refills: 5 | Status: SHIPPED | OUTPATIENT
Start: 2024-12-09

## 2024-12-09 RX ORDER — INSULIN ASPART 100 [IU]/ML
INJECTION, SOLUTION INTRAVENOUS; SUBCUTANEOUS
Qty: 30 ML | Refills: 3 | Status: SHIPPED | OUTPATIENT
Start: 2024-12-09

## 2024-12-10 RX ORDER — PROCHLORPERAZINE 25 MG/1
1 SUPPOSITORY RECTAL
Qty: 1 EACH | Refills: 3 | Status: SHIPPED | OUTPATIENT
Start: 2024-12-10

## 2024-12-18 ENCOUNTER — OFFICE VISIT (OUTPATIENT)
Dept: ENDOCRINOLOGY | Facility: CLINIC | Age: 49
End: 2024-12-18
Payer: MEDICAID

## 2024-12-18 ENCOUNTER — HOSPITAL ENCOUNTER (OUTPATIENT)
Facility: HOSPITAL | Age: 49
Discharge: HOME | End: 2024-12-18
Payer: MEDICAID

## 2024-12-18 VITALS
SYSTOLIC BLOOD PRESSURE: 156 MMHG | HEART RATE: 99 BPM | BODY MASS INDEX: 47.68 KG/M2 | OXYGEN SATURATION: 94 % | DIASTOLIC BLOOD PRESSURE: 91 MMHG | WEIGHT: 293 LBS

## 2024-12-18 DIAGNOSIS — Z12.31 VISIT FOR SCREENING MAMMOGRAM: ICD-10-CM

## 2024-12-18 DIAGNOSIS — E13.9 LADA (LATENT AUTOIMMUNE DIABETES IN ADULTS), MANAGED AS TYPE 2: Primary | ICD-10-CM

## 2024-12-18 PROCEDURE — 77063 BREAST TOMOSYNTHESIS BI: CPT | Performed by: RADIOLOGY

## 2024-12-18 PROCEDURE — 77063 BREAST TOMOSYNTHESIS BI: CPT

## 2024-12-18 PROCEDURE — 82570 ASSAY OF URINE CREATININE: CPT | Performed by: NURSE PRACTITIONER

## 2024-12-18 PROCEDURE — 82043 UR ALBUMIN QUANTITATIVE: CPT | Performed by: NURSE PRACTITIONER

## 2024-12-18 PROCEDURE — 77067 SCR MAMMO BI INCL CAD: CPT | Performed by: RADIOLOGY

## 2024-12-18 PROCEDURE — 77067 SCR MAMMO BI INCL CAD: CPT

## 2024-12-18 RX ORDER — SEMAGLUTIDE 2.68 MG/ML
2 INJECTION, SOLUTION SUBCUTANEOUS WEEKLY
Qty: 3 ML | Refills: 2 | Status: SHIPPED | OUTPATIENT
Start: 2024-12-18 | End: 2024-12-23 | Stop reason: SDUPTHER

## 2024-12-18 NOTE — ASSESSMENT & PLAN NOTE
-Diabetes is above goal with A1c 8.1.  -Discussed dietary and exercise guidelines with patient.  -Discussed the importance of yearly eye exams.  -Discussed the importance of checking BG's regularly. Continue using Dexcom CGM.    1 week pump data download is as follows:  Very High: 26%  High: 42%  In Range: 32%  Low: 0%  Very Low: 0%  Trend shows overall hypers with post supper hypers.  -Adjustments to insulin pump settings:  Basal:   12A: 1.8 u/hr  CR: 1:25  ISF: 75  BG Correction Threshold: 110  BG Target Range: 110   Discussed importance of administering meal time boluses to prevent overt hypers with rebound hypos.  -Increase Ozempic 2 mg weekly.  Patient has no personal history of pancreatitis, no family history of MEN syndrome or medullary thyroid cancer. Possible side effects including nausea, bloating, other GI upset and rarely pancreatitis were discussed. She was advised to call the office with any symptoms or concerns.   -S/S hypoglycemia reviewed with Rule of 15's advised.  -Follow-up in 3 months.

## 2024-12-18 NOTE — PROGRESS NOTES
Chief Complaint   Patient presents with    Diabetes     F/u last A1c done on 10/1/24 value 8.2. [facility] are faxing her labs        Referring Provider  No ref. provider found     HPI   Marquita Vernon is a 49 y.o. female had concerns including Diabetes (F/u last A1c done on 10/1/24 value 8.2. [facility] are faxing her labs).    ECHO treated as T2DM.    She has noted increased BG's recently.  She reports that she is tolerating her medication well.  She reports that she has been using her insulin pump with GLP-1 and is doing well. She denies any adverse effects.    Diabetes:  Diabetes was diagnosed 2009.  Complications include neuropathy.  Last ophtho exam was Feb 2022, [facility].  Current medications for diabetes include Novolog in an Omnipod insulin pump, Ozempic 1 mg weekly.  Past meds: Metformin-GI issues, Januvia-insurance issue  She checks her blood sugar with Dexcom CGM.  Hypos: occasionally, mostly overnight/early morning    Patient noted to be Latent onset Type 1 diabetic with positive CHEYANNE antibodies.  She has been noting improving BG's recently without hypos.  Current pump settings:  Basal:   12A: 1.7 u/hr  CR: 1:25  ISF: 75  BG Correction Threshold: 130  BG Target Range: 110     She has recently switched PDM and entered her settings in.    ACE/ARB:no, Statin: no  Labs:  A1C:8.3 (5/2022)    The following portions of the patient's history were reviewed and updated as appropriate: allergies, current medications, past family history, past medical history, past social history, past surgical history and problem list.    Diet:   Breakfast: doesn't typically eat  Lunch: salad or left overs  Dinner: veggies, meat,   Drinks: diet mtn dew, milk  Snacks: popcorn    Past Medical History:   Diagnosis Date    Abnormal ECG     Arthritis     Asthma     Asthma, extrinsic 1979    Diabetes mellitus     Diabetes mellitus type I 11/2009    GERD (gastroesophageal reflux disease)     Gestational  "diabetes 12/2002    Hearing aid worn     IBS (irritable bowel syndrome)     Lung nodule 12/24/23    Pancreatitis 2017    Scoliosis     Vitamin D deficiency 2017     Past Surgical History:   Procedure Laterality Date    CARDIOVASCULAR STRESS TEST  11/27/2018    L. Cardiolite- Unable to walk. EF 69%. Negative.    CHOLECYSTECTOMY      COLONOSCOPY      COLONOSCOPY N/A 9/21/2018    Procedure: COLONOSCOPY CPT CODE: 71089;  Surgeon: Micah Swartz MD;  Location: [facility] OR;  Service: Gastroenterology    COLONOSCOPY N/A 1/30/2024    Procedure: COLONOSCOPY FOR SCREENING;  Surgeon: Rosita Boyd MD;  Location: [facility] OR;  Service: Gastroenterology;  Laterality: N/A;    CYST REMOVAL      ECHO - CONVERTED  11/27/2018    EF 65%. No AS. Mild MR    ENDOSCOPY      ENDOSCOPY N/A 9/21/2018    Procedure: ESOPHAGOGASTRODUODENOSCOPY WITH BIOPSY CPT CODE: 40727;  Surgeon: Micah Swartz MD;  Location: [facility] OR;  Service: Gastroenterology    OTHER SURGICAL HISTORY  01/02/2019    Event monitor- baseline sinus, one 39 beat SVT, no V-tach, no AFib, no pauses    TUBAL ABDOMINAL LIGATION      UPPER GASTROINTESTINAL ENDOSCOPY  2015 2018      Family History   Problem Relation Age of Onset    Heart attack Father         MI in his late 40's    Hypertension Father     No Known Problems Sister     Diabetes Maternal Grandmother     Diabetes Other     Cancer Other     Lung cancer Other     Breast cancer Neg Hx       Social History     Socioeconomic History    Marital status: Single   Tobacco Use    Smoking status: Never     Passive exposure: Current    Smokeless tobacco: Never   Vaping Use    Vaping status: Never Used   Substance and Sexual Activity    Alcohol use: No    Drug use: No    Sexual activity: Yes     Partners: Male     Birth control/protection: Post-menopausal, Tubal ligation, Surgical      Allergies   Allergen Reactions    Ibuprofen Hives and Other (See Comments)     \"smiley\"    Latex Hives    Vaseline " [Petrolatum] Hives      Current Outpatient Medications on File Prior to Visit   Medication Sig Dispense Refill    Advair -21 MCG/ACT inhaler Inhale 2 puffs 2 (Two) Times a Day. 1 each 5    albuterol sulfate  (90 Base) MCG/ACT inhaler Inhale 2 puffs Every 4 (Four) Hours As Needed for Wheezing. 18 g 5    cetirizine (zyrTEC) 10 MG tablet       Continuous Glucose Sensor (Dexcom G7 Sensor) misc Use 1 each Every 10 (Ten) Days. 3 each 5    cyclobenzaprine (FLEXERIL) 5 MG tablet Take 1 tablet by mouth 3 (Three) Times a Day As Needed for Muscle Spasms.      diclofenac (VOLTAREN) 75 MG EC tablet       Farxiga 10 MG tablet       gabapentin (NEURONTIN) 600 MG tablet       Glucagon HCl (Glucagon Emergency) 1 MG/ML reconstituted solution Inject 1 mL as directed As Needed (severe hypoglycemia). 1 each 4    glucose blood (FREESTYLE LITE) test strip 1 each by Other route 4 (Four) Times a Day. 250 each 2    HYDROcodone-acetaminophen (NORCO) 5-325 MG per tablet Take 1 tablet by mouth Every 8 (Eight) Hours As Needed. Only takes PRN      Insulin Aspart (novoLOG) 100 UNIT/ML injection FOR USE IN INSULIN PUMP. MAX DAILY DOSE  UNITS PER DAY 30 mL 3    Insulin Disposable Pump (Omnipod 5 G6 Intro, Gen 5,) kit USE AS DIRECTED. CHANGE EACH POD EVERY 72 HOURS. 1 kit 0    Insulin Disposable Pump (Omnipod 5 G6 Pod, Gen 5,) misc Use Every Other Day as directed. 15 each 3    lactobacillus acidophilus (RISAQUAD) capsule capsule Take 1 capsule by mouth Daily. 90 capsule 3    Lancets (freestyle) lancets USE 1 LANCET TO CHECK BLOOD GLUCOSE LEVELS FOUR TIMES DAILY 100 each 3    levocetirizine (XYZAL) 5 MG tablet       multivitamin (THERAGRAN) tablet tablet Take 1 tablet by mouth Daily.      omeprazole (priLOSEC) 20 MG capsule Take 2 capsules by mouth 2 (Two) Times a Day. 30 capsule 0    PARoxetine (PAXIL) 40 MG tablet Daily.      Ventolin  (90 Base) MCG/ACT inhaler INHALE 2 PUFFS BY MOUTH EVERY 4 HOURS AS NEEDED FOR WHEEZING  18 g 5    vitamin D (ERGOCALCIFEROL) 1.25 MG (38176 UT) capsule capsule Take 1 capsule by mouth Every 7 (Seven) Days. Saturdays      [DISCONTINUED] Semaglutide, 1 MG/DOSE, (Ozempic, 1 MG/DOSE,) 4 MG/3ML solution pen-injector Inject 1 mg under the skin into the appropriate area as directed 1 (One) Time Per Week. 3 mL 2    [DISCONTINUED] Continuous Glucose  (Dexcom G6 ) device Use 1 each Take As Directed. 1 each 0    [DISCONTINUED] Continuous Glucose Transmitter (Dexcom G6 Transmitter) misc Use 1 each Every 3 (Three) Months. 1 each 3    [DISCONTINUED] Insulin Pen Needle (B-D ULTRAFINE III SHORT PEN) 31G X 8 MM misc USE AS DIRECTED TO INJECT INSULIN FOUR TIMES DAILY 100 each 5     No current facility-administered medications on file prior to visit.        Review of Systems   Constitutional:  Positive for fatigue. Negative for unexpected weight gain and unexpected weight loss.   Eyes: Negative.    Endocrine: Negative for polydipsia, polyphagia and polyuria.   Skin:  Positive for bruise.   Psychiatric/Behavioral:  Positive for sleep disturbance.    All other systems reviewed and are negative.    /91 (BP Location: Right arm, Patient Position: Sitting, Cuff Size: Adult)   Pulse 99   Wt 134 kg (295 lb 6.4 oz)   LMP 05/09/2018 (Approximate)   SpO2 94%   BMI 47.68 kg/m²      Physical Exam  Vitals reviewed.   Constitutional:       Appearance: Normal appearance.   Eyes:      Extraocular Movements: Extraocular movements intact.   Cardiovascular:      Rate and Rhythm: Tachycardia present.   Pulmonary:      Effort: Pulmonary effort is normal.   Skin:     General: Skin is warm and dry.   Neurological:      General: No focal deficit present.      Mental Status: She is alert and oriented to person, place, and time.   Psychiatric:         Mood and Affect: Mood normal.         Behavior: Behavior normal.         Thought Content: Thought content normal.         Judgment: Judgment normal.       CMP:  Lab Results  "  Component Value Date    Glucose 200 (A) 03/29/2024    Glucose 164 (H) 08/18/2023    Glucose 733 (C) 08/14/2023    Glucose >=1,000 (A) 08/13/2023    Glucose, UA Negative 10/11/2023    BUN 28 (H) 03/26/2024    BUN/Creatinine Ratio 20.7 03/26/2024    Creatinine 1.40 (H) 03/29/2024    Creatinine, Urine 31.6 04/04/2023    Creatinine, Urine 31.6 04/04/2023    Ketones, UA Negative 10/11/2023    KIT EXPIRATION DATE n/a 09/23/2021    KIT LOT NUMBER n/a 09/23/2021    CO2 18.0 (L) 03/26/2024    CO2 Content 5.5 (L) 08/13/2023    Calcium 9.4 03/26/2024    Albumin 4.3 03/26/2024    AST (SGOT) 15 03/26/2024    ALT (SGPT) 20 03/26/2024     Lipid Panel:  No results found for: \"CHOL\", \"TRIG\", \"HDL\", \"VLDL\", \"LDL\"  HbA1c:  Lab Results   Component Value Date    HGBA1C 7.9 (A) 03/29/2024 06/2024: 8.1 (at PCP)  8.2 (10/1/2024)    Glucose:  Lab Results   Component Value Date    POCGLU 200 (A) 03/29/2024     Microalbumin:  Lab Results   Component Value Date    MALBCRERATIO  04/04/2023      Comment:      Unable to calculate     TSH:  Lab Results   Component Value Date    TSH 0.708 09/27/2023       Assessment and Plan    Diagnoses and all orders for this visit:    1. 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  -Discussed dietary and exercise guidelines with patient.  -Discussed the importance of yearly eye exams.  -Discussed the importance of checking BG's regularly. Continue using Dexcom CGM.    1 week pump data download is as follows:  Very High: 26%  High: 42%  In Range: 32%  Low: 0%  Very Low: 0%  Trend shows overall hypers with post supper hypers.  -Adjustments to insulin pump settings:  Basal:   12A: 1.8 u/hr  CR: 1:25  ISF: 75  BG Correction Threshold: 110  BG Target Range: 110   Discussed importance of administering meal time boluses to prevent overt hypers with rebound hypos.  -Increase Ozempic 2 mg weekly.  Patient has no personal history " of pancreatitis, no family history of MEN syndrome or medullary thyroid cancer. Possible side effects including nausea, bloating, other GI upset and rarely pancreatitis were discussed. She was advised to call the office with any symptoms or concerns.   -S/S hypoglycemia reviewed with Rule of 15's advised.  -Follow-up in 3 months.    Orders:  -     Microalbumin / Creatinine Urine Ratio - Urine, Clean Catch    Other orders  -     Semaglutide, 2 MG/DOSE, (Ozempic, 2 MG/DOSE,) 8 MG/3ML solution pen-injector; Inject 2 mg under the skin into the appropriate area as directed 1 (One) Time Per Week.  Dispense: 3 mL; Refill: 2                     Return in about 3 months (around 3/18/2025) for Follow-up appointment, A1C. The patient was instructed to contact the clinic with any interval questions or concerns.        This document has been electronically signed by ALIE Gonzalez  December 18, 2024 12:13 EST   Endocrinology    Please note that portions of this document were completed with a voice recognition program. Efforts were made to edit the dictations, but occasionally words are mis-transcribed.

## 2024-12-19 LAB
ALBUMIN/CREAT UR: 10 MG/G CREAT (ref 0–29)
CREAT UR-MCNC: 171.5 MG/DL
MICROALBUMIN UR-MCNC: 17.6 UG/ML

## 2024-12-19 RX ORDER — ACYCLOVIR 400 MG/1
1 TABLET ORAL TAKE AS DIRECTED
Qty: 1 EACH | Refills: 0 | Status: SHIPPED | OUTPATIENT
Start: 2024-12-19

## 2024-12-20 RX ORDER — INSULIN PMP CART,AUT,G6/7,CNTR
1 EACH SUBCUTANEOUS EVERY OTHER DAY
Qty: 15 EACH | Refills: 3 | Status: SHIPPED | OUTPATIENT
Start: 2024-12-20 | End: 2024-12-23 | Stop reason: SDUPTHER

## 2024-12-23 RX ORDER — INSULIN PMP CART,AUT,G6/7,CNTR
1 EACH SUBCUTANEOUS EVERY OTHER DAY
Qty: 15 EACH | Refills: 3 | Status: SHIPPED | OUTPATIENT
Start: 2024-12-23 | End: 2024-12-30 | Stop reason: SDUPTHER

## 2024-12-23 RX ORDER — SEMAGLUTIDE 2.68 MG/ML
2 INJECTION, SOLUTION SUBCUTANEOUS WEEKLY
Qty: 3 ML | Refills: 2 | Status: SHIPPED | OUTPATIENT
Start: 2024-12-23

## 2024-12-30 RX ORDER — INSULIN PMP CART,AUT,G6/7,CNTR
1 EACH SUBCUTANEOUS EVERY OTHER DAY
Qty: 15 EACH | Refills: 3 | Status: SHIPPED | OUTPATIENT
Start: 2024-12-30

## 2024-12-30 RX ORDER — FLURBIPROFEN SODIUM 0.3 MG/ML
SOLUTION/ DROPS OPHTHALMIC
Qty: 100 EACH | Refills: 5 | Status: SHIPPED | OUTPATIENT
Start: 2024-12-30

## 2024-12-30 RX ORDER — INSULIN ASPART 100 [IU]/ML
INJECTION, SOLUTION INTRAVENOUS; SUBCUTANEOUS
Qty: 30 ML | Refills: 3 | Status: SHIPPED | OUTPATIENT
Start: 2024-12-30

## 2024-12-30 RX ORDER — GLUCAGON 1 MG
1 KIT INJECTION AS NEEDED
Qty: 1 EACH | Refills: 4 | Status: SHIPPED | OUTPATIENT
Start: 2024-12-30

## 2024-12-30 RX ORDER — PROCHLORPERAZINE 25 MG/1
SUPPOSITORY RECTAL
Qty: 3 EACH | Refills: 3 | Status: SHIPPED | OUTPATIENT
Start: 2024-12-30

## 2024-12-30 RX ORDER — INSULIN GLARGINE 100 [IU]/ML
INJECTION, SOLUTION SUBCUTANEOUS
Qty: 30 ML | Refills: 4 | Status: SHIPPED | OUTPATIENT
Start: 2024-12-30

## 2025-01-13 RX ORDER — BLOOD-GLUCOSE METER
1 KIT MISCELLANEOUS 4 TIMES DAILY
Qty: 250 EACH | Refills: 2 | Status: SHIPPED | OUTPATIENT
Start: 2025-01-13

## 2025-01-13 RX ORDER — GLUCAGON 1 MG
1 KIT INJECTION AS NEEDED
Qty: 1 EACH | Refills: 4 | Status: SHIPPED | OUTPATIENT
Start: 2025-01-13

## 2025-01-13 RX ORDER — SEMAGLUTIDE 2.68 MG/ML
2 INJECTION, SOLUTION SUBCUTANEOUS WEEKLY
Qty: 3 ML | Refills: 2 | Status: SHIPPED | OUTPATIENT
Start: 2025-01-13

## 2025-01-13 RX ORDER — INSULIN PMP CART,AUT,G6/7,CNTR
1 EACH SUBCUTANEOUS EVERY OTHER DAY
Qty: 15 EACH | Refills: 3 | Status: SHIPPED | OUTPATIENT
Start: 2025-01-13

## 2025-03-18 RX ORDER — SIMETHICONE 125 MG
CAPSULE ORAL
Qty: 50 TABLET | Refills: 12 | Status: SHIPPED | OUTPATIENT
Start: 2025-03-18 | End: 2025-03-24 | Stop reason: SDUPTHER

## 2025-03-24 RX ORDER — ALBUTEROL SULFATE 90 UG/1
2 INHALANT RESPIRATORY (INHALATION) EVERY 4 HOURS PRN
Qty: 18 G | Refills: 5 | Status: SHIPPED | OUTPATIENT
Start: 2025-03-24

## 2025-03-24 RX ORDER — SEMAGLUTIDE 2.68 MG/ML
2 INJECTION, SOLUTION SUBCUTANEOUS WEEKLY
Qty: 3 ML | Refills: 2 | Status: SHIPPED | OUTPATIENT
Start: 2025-03-24

## 2025-03-24 RX ORDER — GLUCAGON 1 MG
1 KIT INJECTION AS NEEDED
Qty: 1 EACH | Refills: 4 | Status: SHIPPED | OUTPATIENT
Start: 2025-03-24

## 2025-03-24 RX ORDER — ALBUTEROL SULFATE 90 UG/1
2 AEROSOL, METERED RESPIRATORY (INHALATION) EVERY 4 HOURS PRN
Qty: 18 G | Refills: 5 | Status: SHIPPED | OUTPATIENT
Start: 2025-03-24

## 2025-03-31 RX ORDER — INSULIN ASPART 100 [IU]/ML
INJECTION, SOLUTION INTRAVENOUS; SUBCUTANEOUS
Qty: 30 ML | Refills: 3 | Status: SHIPPED | OUTPATIENT
Start: 2025-03-31

## 2025-04-02 ENCOUNTER — OFFICE VISIT (OUTPATIENT)
Dept: ENDOCRINOLOGY | Facility: CLINIC | Age: 50
End: 2025-04-02
Payer: MEDICAID

## 2025-04-02 VITALS
BODY MASS INDEX: 47.61 KG/M2 | DIASTOLIC BLOOD PRESSURE: 88 MMHG | HEART RATE: 94 BPM | WEIGHT: 293 LBS | OXYGEN SATURATION: 98 % | SYSTOLIC BLOOD PRESSURE: 148 MMHG

## 2025-04-02 DIAGNOSIS — E13.9 LADA (LATENT AUTOIMMUNE DIABETES IN ADULTS), MANAGED AS TYPE 2: Primary | ICD-10-CM

## 2025-04-02 NOTE — PROGRESS NOTES
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 Last labs done 4/1/2025 being faxed         Referring Provider  No ref. provider found     HPI   Marquita Vernon is a 49 y.o. 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 Last labs done 4/1/2025 being faxed ).    ECHO treated as T2DM.    She has noted increased BG's recently.  She reports that she is tolerating her medication well.  She reports that she has been using her insulin pump with GLP-1 and is doing well. She denies any adverse effects. She has noted improvements in her hypos and these happen rarely now.      Diabetes:  Diabetes was diagnosed 2009.  Complications include neuropathy.  Last ophtho exam was Feb 2022, [facility].  Current medications for diabetes include Novolog in an Omnipod insulin pump, Ozempic 2 mg weekly.  Past meds: Metformin-GI issues, Januvia-insurance issue  She checks her blood sugar with Dexcom CGM.  Hypos: occasionally, mostly overnight/early morning    Patient noted to be Latent onset Type 1 diabetic with positive CHEYANNE antibodies.  She has been noting improving BG's recently without hypos.  Current pump settings:  Basal:   12A: 1.85 u/hr  CR: 1:25  ISF: 75  BG Correction Threshold: 130  BG Target Range: 110     ACE/ARB:no, Statin: no  Labs:  A1C:8.3 (5/2022)    The following portions of the patient's history were reviewed and updated as appropriate: allergies, current medications, past family history, past medical history, past social history, past surgical history and problem list.    Diet:   Breakfast: doesn't typically eat  Lunch: salad or left overs  Dinner: veggies, meat,   Drinks: diet mtn dew, milk  Snacks: popcorn    Past Medical History:   Diagnosis Date    Abnormal ECG     Arthritis     Asthma     Asthma, extrinsic 1979    Diabetes mellitus     Diabetes mellitus type I  11/2009    GERD (gastroesophageal reflux disease)     Gestational diabetes 12/2002    Hearing aid worn     IBS (irritable bowel syndrome)     Lung nodule 12/24/23    Pancreatitis 2017    Scoliosis     Vitamin D deficiency 2017     Past Surgical History:   Procedure Laterality Date    CARDIOVASCULAR STRESS TEST  11/27/2018    L. Cardiolite- Unable to walk. EF 69%. Negative.    CHOLECYSTECTOMY      COLONOSCOPY      COLONOSCOPY N/A 9/21/2018    Procedure: COLONOSCOPY CPT CODE: 49148;  Surgeon: Micah Swartz MD;  Location: [facility] OR;  Service: Gastroenterology    COLONOSCOPY N/A 1/30/2024    Procedure: COLONOSCOPY FOR SCREENING;  Surgeon: Rosita Boyd MD;  Location: [facility] OR;  Service: Gastroenterology;  Laterality: N/A;    CYST REMOVAL      ECHO - CONVERTED  11/27/2018    EF 65%. No AS. Mild MR    ENDOSCOPY      ENDOSCOPY N/A 9/21/2018    Procedure: ESOPHAGOGASTRODUODENOSCOPY WITH BIOPSY CPT CODE: 55655;  Surgeon: Micah Swartz MD;  Location: [facility] OR;  Service: Gastroenterology    OTHER SURGICAL HISTORY  01/02/2019    Event monitor- baseline sinus, one 39 beat SVT, no V-tach, no AFib, no pauses    TUBAL ABDOMINAL LIGATION      UPPER GASTROINTESTINAL ENDOSCOPY  2015 2018      Family History   Problem Relation Age of Onset    Heart attack Father         MI in his late 40's    Hypertension Father     No Known Problems Sister     Diabetes Maternal Grandmother     Diabetes Other     Cancer Other     Lung cancer Other     Breast cancer Neg Hx       Social History     Socioeconomic History    Marital status: Single   Tobacco Use    Smoking status: Never     Passive exposure: Current    Smokeless tobacco: Never   Vaping Use    Vaping status: Never Used   Substance and Sexual Activity    Alcohol use: No    Drug use: No    Sexual activity: Yes     Partners: Male     Birth control/protection: Post-menopausal, Tubal ligation, Surgical      Allergies   Allergen Reactions    Ibuprofen Hives and  "Other (See Comments)     \"smiley\"    Latex Hives    Vaseline [Petrolatum] Hives      Current Outpatient Medications on File Prior to Visit   Medication Sig Dispense Refill    Advair -21 MCG/ACT inhaler Inhale 2 puffs 2 (Two) Times a Day. 1 each 5    albuterol sulfate  (90 Base) MCG/ACT inhaler Inhale 2 puffs Every 4 (Four) Hours As Needed for Wheezing. 18 g 5    cetirizine (zyrTEC) 10 MG tablet       Continuous Glucose  (Dexcom G7 ) device Use 1 each Take As Directed. 1 each 0    Continuous Glucose Sensor (Dexcom G6 Sensor) USE TO MONITOR BLOOD GLUCOSE LEVELS AS DIRECTED. CHANGE SENSOR EVERY 10 DAYS. 3 each 3    cyclobenzaprine (FLEXERIL) 5 MG tablet Take 1 tablet by mouth 3 (Three) Times a Day As Needed for Muscle Spasms.      diclofenac (VOLTAREN) 75 MG EC tablet       Farxiga 10 MG tablet       gabapentin (NEURONTIN) 600 MG tablet       Glucagon HCl (Glucagon Emergency) 1 MG/ML reconstituted solution Inject 1 mL as directed As Needed (severe hypoglycemia). 1 each 4    glucose (GNP WATERMELON GLUCOSE) 4 GM chewable tablet Chew 1 tablet As Needed for Low Blood Sugar. 50 tablet 5    glucose blood (FREESTYLE LITE) test strip 1 each by Other route 4 (Four) Times a Day. 250 each 2    HYDROcodone-acetaminophen (NORCO) 5-325 MG per tablet Take 1 tablet by mouth Every 8 (Eight) Hours As Needed. Only takes PRN      Insulin Aspart (novoLOG) 100 UNIT/ML injection FOR USE IN INSULIN PUMP. MAX DAILY DOSE  UNITS PER DAY 30 mL 3    Insulin Disposable Pump (Omnipod 5 QnsI2T9 Pods Gen 5) misc Use 1 each Every Other Day. 15 each 3    Insulin Disposable Pump (Omnipod 5 G6 Pod, Gen 5,) misc Use Every Other Day as directed. 15 each 3    lactobacillus acidophilus (RISAQUAD) capsule capsule Take 1 capsule by mouth Daily. 90 capsule 3    Lancets (freestyle) lancets USE 1 LANCET TO CHECK BLOOD GLUCOSE LEVELS FOUR TIMES DAILY 100 each 3    levocetirizine (XYZAL) 5 MG tablet       multivitamin (THERAGRAN) " tablet tablet Take 1 tablet by mouth Daily.      omeprazole (priLOSEC) 20 MG capsule Take 2 capsules by mouth 2 (Two) Times a Day. 30 capsule 0    PARoxetine (PAXIL) 40 MG tablet Daily.      Semaglutide, 2 MG/DOSE, (Ozempic, 2 MG/DOSE,) 8 MG/3ML solution pen-injector Inject 2 mg under the skin into the appropriate area as directed 1 (One) Time Per Week. 3 mL 2    vitamin D (ERGOCALCIFEROL) 1.25 MG (45118 UT) capsule capsule Take 1 capsule by mouth Every 7 (Seven) Days. Saturdays      Insulin Disposable Pump (Omnipod 5 G6 Intro, Gen 5,) kit USE AS DIRECTED. CHANGE EACH POD EVERY 72 HOURS. 1 kit 0    Insulin Pen Needle (B-D UF III MINI PEN NEEDLES) 31G X 5 MM misc USE AS DIRECTED TO INJECT INSULIN FOUR TIMES DAILY 100 each 5    Lantus SoloStar 100 UNIT/ML injection pen INJECT 75 UNITS SUBCUTANEOUSLY EVERY NIGHT 30 mL 4    Ventolin  (90 Base) MCG/ACT inhaler Inhale 2 puffs Every 4 (Four) Hours As Needed for Wheezing. 18 g 5     No current facility-administered medications on file prior to visit.        Review of Systems   Constitutional:  Positive for fatigue. Negative for unexpected weight gain and unexpected weight loss.   Eyes: Negative.    Endocrine: Negative for polydipsia, polyphagia and polyuria.   Skin:  Positive for bruise.   Psychiatric/Behavioral:  Positive for sleep disturbance.    All other systems reviewed and are negative.    /88 (BP Location: Right arm, Patient Position: Sitting, Cuff Size: Large Adult)   Pulse 94   Wt 134 kg (295 lb)   LMP 05/09/2018 (Approximate)   SpO2 98%   BMI 47.61 kg/m²      Physical Exam  Vitals reviewed.   Constitutional:       Appearance: Normal appearance.   Eyes:      Extraocular Movements: Extraocular movements intact.   Cardiovascular:      Rate and Rhythm: Tachycardia present.   Pulmonary:      Effort: Pulmonary effort is normal.   Skin:     General: Skin is warm and dry.   Neurological:      General: No focal deficit present.      Mental Status: She is  "alert and oriented to person, place, and time.   Psychiatric:         Mood and Affect: Mood normal.         Behavior: Behavior normal.         Thought Content: Thought content normal.         Judgment: Judgment normal.       CMP:  Lab Results   Component Value Date    Glucose 200 (A) 03/29/2024    Glucose 164 (H) 08/18/2023    Glucose 733 (C) 08/14/2023    Glucose >=1,000 (A) 08/13/2023    Glucose, UA Negative 10/11/2023    BUN 28 (H) 03/26/2024    BUN/Creatinine Ratio 20.7 03/26/2024    Creatinine 1.40 (H) 03/29/2024    Creatinine, Urine 171.5 12/18/2024    Creatinine, Urine 31.6 04/04/2023    Creatinine, Urine 31.6 04/04/2023    Ketones, UA Negative 10/11/2023    KIT EXPIRATION DATE n/a 09/23/2021    KIT LOT NUMBER n/a 09/23/2021    CO2 18.0 (L) 03/26/2024    CO2 Content 5.5 (L) 08/13/2023    Calcium 9.4 03/26/2024    Albumin 4.3 03/26/2024    AST (SGOT) 15 03/26/2024    ALT (SGPT) 20 03/26/2024     Lipid Panel:  No results found for: \"CHOL\", \"TRIG\", \"HDL\", \"VLDL\", \"LDL\"  HbA1c:  Lab Results   Component Value Date    HGBA1C 7.9 (A) 03/29/2024 06/2024: 8.1 (at PCP)  8.2 (10/1/2024)    Glucose:  Lab Results   Component Value Date    POCGLU 200 (A) 03/29/2024     Microalbumin:  Lab Results   Component Value Date    MALBCRERATIO 10 12/18/2024     TSH:  Lab Results   Component Value Date    TSH 0.708 09/27/2023       Assessment and Plan    Diagnoses and all orders for this visit:    1. 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  A1c was done at PCP yesterday and has not resulted yet.  -Discussed dietary and exercise guidelines with patient.  -Discussed the importance of yearly eye exams.  -Discussed the importance of checking BG's regularly. Continue using Dexcom CGM.    2 week pump data download is as follows:  Very High: 20%  High: 53%  In Range: 27%  Low: 0%  Very Low: 0%  Trend shows overall hypers with post supper " hypers.  -Adjustments to insulin pump settings:  Basal:   12A: 1.8 u/hr  CR: 1:25  ISF: 75  BG Correction Threshold: 130  BG Target Range: 110   Discussed importance of administering meal time boluses to prevent overt hypers with rebound hypos.  -Continue Ozempic 2 mg weekly.  Patient has no personal history of pancreatitis, no family history of MEN syndrome or medullary thyroid cancer. Possible side effects including nausea, bloating, other GI upset and rarely pancreatitis were discussed. She was advised to call the office with any symptoms or concerns.   -Discussed other insulin pumps with patient.  She is interested in switching to Beta Islet insulin pump to help better control BG's.  Will start the paperwork for her to get pump and complete training.  -Discussed Glucagon and appropriate time of use.  -S/S hypoglycemia reviewed with Rule of 15's advised.  -Follow-up in 3 months.        Return in about 3 months (around 7/2/2025) for Follow-up appointment. The patient was instructed to contact the clinic with any interval questions or concerns.        This document has been electronically signed by ALIE Gonzalez  April 2, 2025 10:39 EDT   Endocrinology    Please note that portions of this document were completed with a voice recognition program. Efforts were made to edit the dictations, but occasionally words are mis-transcribed.

## 2025-04-02 NOTE — ASSESSMENT & PLAN NOTE
-Diabetes is above goal with BG's above goal.  A1c was done at PCP yesterday and has not resulted yet.  -Discussed dietary and exercise guidelines with patient.  -Discussed the importance of yearly eye exams.  -Discussed the importance of checking BG's regularly. Continue using Dexcom CGM.    2 week pump data download is as follows:  Very High: 20%  High: 53%  In Range: 27%  Low: 0%  Very Low: 0%  Trend shows overall hypers with post supper hypers.  -Adjustments to insulin pump settings:  Basal:   12A: 1.8 u/hr  CR: 1:25  ISF: 75  BG Correction Threshold: 130  BG Target Range: 110   Discussed importance of administering meal time boluses to prevent overt hypers with rebound hypos.  -Continue Ozempic 2 mg weekly.  Patient has no personal history of pancreatitis, no family history of MEN syndrome or medullary thyroid cancer. Possible side effects including nausea, bloating, other GI upset and rarely pancreatitis were discussed. She was advised to call the office with any symptoms or concerns.   -Discussed other insulin pumps with patient.  She is interested in switching to Beta Islet insulin pump to help better control BG's.  Will start the paperwork for her to get pump and complete training.  -Discussed Glucagon and appropriate time of use.  -S/S hypoglycemia reviewed with Rule of 15's advised.  -Follow-up in 3 months.

## 2025-04-22 RX ORDER — SEMAGLUTIDE 2.68 MG/ML
2 INJECTION, SOLUTION SUBCUTANEOUS WEEKLY
Qty: 3 ML | Refills: 2 | Status: SHIPPED | OUTPATIENT
Start: 2025-04-22

## 2025-05-06 RX ORDER — ACYCLOVIR 400 MG/1
TABLET ORAL
Qty: 3 EACH | Refills: 6 | Status: SHIPPED | OUTPATIENT
Start: 2025-05-06

## 2025-05-07 ENCOUNTER — TELEPHONE (OUTPATIENT)
Dept: ENDOCRINOLOGY | Facility: CLINIC | Age: 50
End: 2025-05-07
Payer: MEDICAID

## 2025-05-07 NOTE — TELEPHONE ENCOUNTER
Patient called and says that her pump is giving her 18-24 units of insulin when she puts her meals in and is making her sugar run low (98) and going down. Please advise.

## 2025-05-07 NOTE — TELEPHONE ENCOUNTER
She needs to be counting her carbs appropriately.  She can put in less carbs that will keep it from lowering.  Remind patient that a low blood sugar is less than 70.

## 2025-05-22 ENCOUNTER — HOSPITAL ENCOUNTER (EMERGENCY)
Facility: HOSPITAL | Age: 50
Discharge: HOME | End: 2025-05-23
Payer: MEDICAID

## 2025-05-22 DIAGNOSIS — E16.2 HYPOGLYCEMIA: Primary | ICD-10-CM

## 2025-05-22 DIAGNOSIS — T85.694A: ICD-10-CM

## 2025-05-22 LAB
ALBUMIN SERPL-MCNC: 3.8 G/DL (ref 3.5–5.2)
ALBUMIN/GLOB SERPL: 1.3 G/DL
ALP SERPL-CCNC: 110 U/L (ref 39–117)
ALT SERPL W P-5'-P-CCNC: 11 U/L (ref 1–33)
ANION GAP SERPL CALCULATED.3IONS-SCNC: 12.3 MMOL/L (ref 5–15)
AST SERPL-CCNC: 19 U/L (ref 1–32)
BASOPHILS # BLD AUTO: 0.08 10*3/MM3 (ref 0–0.2)
BASOPHILS NFR BLD AUTO: 0.6 % (ref 0–1.5)
BILIRUB SERPL-MCNC: 0.4 MG/DL (ref 0–1.2)
BUN SERPL-MCNC: 21 MG/DL (ref 6–20)
BUN/CREAT SERPL: 15.1 (ref 7–25)
CALCIUM SPEC-SCNC: 8.9 MG/DL (ref 8.6–10.5)
CHLORIDE SERPL-SCNC: 105 MMOL/L (ref 98–107)
CO2 SERPL-SCNC: 22.7 MMOL/L (ref 22–29)
CREAT SERPL-MCNC: 1.39 MG/DL (ref 0.57–1)
DEPRECATED RDW RBC AUTO: 48.3 FL (ref 37–54)
EGFRCR SERPLBLD CKD-EPI 2021: 46.3 ML/MIN/1.73
EOSINOPHIL # BLD AUTO: 0.74 10*3/MM3 (ref 0–0.4)
EOSINOPHIL NFR BLD AUTO: 5.8 % (ref 0.3–6.2)
ERYTHROCYTE [DISTWIDTH] IN BLOOD BY AUTOMATED COUNT: 14.2 % (ref 12.3–15.4)
GLOBULIN UR ELPH-MCNC: 3 GM/DL
GLUCOSE BLDC GLUCOMTR-MCNC: 80 MG/DL (ref 70–130)
GLUCOSE SERPL-MCNC: 160 MG/DL (ref 65–99)
HCT VFR BLD AUTO: 43.7 % (ref 34–46.6)
HGB BLD-MCNC: 13.2 G/DL (ref 12–15.9)
IMM GRANULOCYTES # BLD AUTO: 0.07 10*3/MM3 (ref 0–0.05)
IMM GRANULOCYTES NFR BLD AUTO: 0.5 % (ref 0–0.5)
LYMPHOCYTES # BLD AUTO: 3.31 10*3/MM3 (ref 0.7–3.1)
LYMPHOCYTES NFR BLD AUTO: 26 % (ref 19.6–45.3)
MAGNESIUM SERPL-MCNC: 1.7 MG/DL (ref 1.6–2.6)
MCH RBC QN AUTO: 28 PG (ref 26.6–33)
MCHC RBC AUTO-ENTMCNC: 30.2 G/DL (ref 31.5–35.7)
MCV RBC AUTO: 92.6 FL (ref 79–97)
MONOCYTES # BLD AUTO: 0.61 10*3/MM3 (ref 0.1–0.9)
MONOCYTES NFR BLD AUTO: 4.8 % (ref 5–12)
NEUTROPHILS NFR BLD AUTO: 62.3 % (ref 42.7–76)
NEUTROPHILS NFR BLD AUTO: 7.94 10*3/MM3 (ref 1.7–7)
NRBC BLD AUTO-RTO: 0 /100 WBC (ref 0–0.2)
PLATELET # BLD AUTO: 232 10*3/MM3 (ref 140–450)
PMV BLD AUTO: 13 FL (ref 6–12)
POTASSIUM SERPL-SCNC: 4.4 MMOL/L (ref 3.5–5.2)
PROT SERPL-MCNC: 6.8 G/DL (ref 6–8.5)
RBC # BLD AUTO: 4.72 10*6/MM3 (ref 3.77–5.28)
SODIUM SERPL-SCNC: 140 MMOL/L (ref 136–145)
TSH SERPL DL<=0.05 MIU/L-ACNC: 0.36 UIU/ML (ref 0.27–4.2)
WBC NRBC COR # BLD AUTO: 12.75 10*3/MM3 (ref 3.4–10.8)

## 2025-05-22 PROCEDURE — 82948 REAGENT STRIP/BLOOD GLUCOSE: CPT

## 2025-05-22 PROCEDURE — 36415 COLL VENOUS BLD VENIPUNCTURE: CPT

## 2025-05-22 PROCEDURE — 83735 ASSAY OF MAGNESIUM: CPT

## 2025-05-22 PROCEDURE — 99283 EMERGENCY DEPT VISIT LOW MDM: CPT

## 2025-05-22 PROCEDURE — 80053 COMPREHEN METABOLIC PANEL: CPT

## 2025-05-22 PROCEDURE — 84443 ASSAY THYROID STIM HORMONE: CPT

## 2025-05-22 PROCEDURE — 85025 COMPLETE CBC W/AUTO DIFF WBC: CPT

## 2025-05-23 ENCOUNTER — TELEPHONE (OUTPATIENT)
Dept: ENDOCRINOLOGY | Facility: CLINIC | Age: 50
End: 2025-05-23
Payer: MEDICAID

## 2025-05-23 VITALS
TEMPERATURE: 97.8 F | BODY MASS INDEX: 45.32 KG/M2 | WEIGHT: 282 LBS | RESPIRATION RATE: 16 BRPM | DIASTOLIC BLOOD PRESSURE: 97 MMHG | HEIGHT: 66 IN | OXYGEN SATURATION: 94 % | HEART RATE: 86 BPM | SYSTOLIC BLOOD PRESSURE: 130 MMHG

## 2025-05-23 RX ORDER — GLUCAGON 1 MG
1 KIT INJECTION AS NEEDED
Qty: 1 EACH | Refills: 4 | Status: SHIPPED | OUTPATIENT
Start: 2025-05-23

## 2025-05-23 NOTE — DISCHARGE INSTRUCTIONS
The patient has been given information on hypoglycemia and how to prevent it and how to treat it.  The patient has agreed that she will return to a basal bolus regimen via subcutaneous injections and discontinue the insulin pump at this time.  She has agreed to call the endocrinology clinic in the morning with the hopes that she can get a same-day outpatient appointment.  If she cannot, the patient will continue with a basal bolus regimen over the course of the weekend until she can be seen.  The patient knows that she should return to the emergency department should she have any other complications over the weekend.

## 2025-05-23 NOTE — ED PROVIDER NOTES
"Subjective   History of Present Illness  Patient presents today with a 1 to 2-day history of feeling generally unwell.  The patient states she recently got an insulin pump a few days ago and has had 2 separate episodes of hypoglycemia today.  Patient had 2 take glucagon and on the second occasion has also had to take additional orange juice before it came above 80.  The patient was brought into the emergency department for further evaluation and management.  The patient states that she is also on the Glucophage and Ozempic (but her chart indicates she is on Farxiga and NOT Glucophage).  The patient has vomited today and felt significantly nauseous as well as having a headache.      Review of Systems   Constitutional:  Positive for activity change and appetite change. Negative for chills, diaphoresis and fever.   Respiratory: Negative.     Cardiovascular: Negative.    Gastrointestinal:  Positive for nausea and vomiting. Negative for abdominal distention, abdominal pain, constipation and diarrhea.   Genitourinary: Negative.    Musculoskeletal: Negative.    Skin: Negative.    Neurological:  Positive for light-headedness and headaches. Negative for tremors, seizures, syncope, facial asymmetry, speech difficulty, weakness and numbness.   Psychiatric/Behavioral: Negative.         Past Medical History:   Diagnosis Date    Abnormal ECG     Arthritis     Asthma     Asthma, extrinsic 1979    Diabetes mellitus     Diabetes mellitus type I 11/2009    GERD (gastroesophageal reflux disease)     Gestational diabetes 12/2002    Hearing aid worn     IBS (irritable bowel syndrome)     Lung nodule 12/24/23    Pancreatitis 2017    Scoliosis     Vitamin D deficiency 2017       Allergies   Allergen Reactions    Ibuprofen Hives and Other (See Comments)     \"smiley\"    Latex Hives    Vaseline [Petrolatum] Hives       Past Surgical History:   Procedure Laterality Date    CARDIOVASCULAR STRESS TEST  11/27/2018    L. Cardiolite- Unable to " walk. EF 69%. Negative.    CHOLECYSTECTOMY      COLONOSCOPY      COLONOSCOPY N/A 9/21/2018    Procedure: COLONOSCOPY CPT CODE: 51714;  Surgeon: Micah Swartz MD;  Location: [facility] OR;  Service: Gastroenterology    COLONOSCOPY N/A 1/30/2024    Procedure: COLONOSCOPY FOR SCREENING;  Surgeon: Rostia Boyd MD;  Location: [facility] OR;  Service: Gastroenterology;  Laterality: N/A;    CYST REMOVAL      ECHO - CONVERTED  11/27/2018    EF 65%. No AS. Mild MR    ENDOSCOPY      ENDOSCOPY N/A 9/21/2018    Procedure: ESOPHAGOGASTRODUODENOSCOPY WITH BIOPSY CPT CODE: 59220;  Surgeon: Mciah Swartz MD;  Location: [facility] OR;  Service: Gastroenterology    OTHER SURGICAL HISTORY  01/02/2019    Event monitor- baseline sinus, one 39 beat SVT, no V-tach, no AFib, no pauses    TUBAL ABDOMINAL LIGATION      UPPER GASTROINTESTINAL ENDOSCOPY  2015 2018       Family History   Problem Relation Age of Onset    Heart attack Father         MI in his late 40's    Hypertension Father     No Known Problems Sister     Diabetes Maternal Grandmother     Diabetes Other     Cancer Other     Lung cancer Other     Breast cancer Neg Hx        Social History     Socioeconomic History    Marital status: Single   Tobacco Use    Smoking status: Never     Passive exposure: Current    Smokeless tobacco: Never   Vaping Use    Vaping status: Never Used   Substance and Sexual Activity    Alcohol use: No    Drug use: No    Sexual activity: Yes     Partners: Male     Birth control/protection: Post-menopausal, Tubal ligation, Surgical           Objective   Physical Exam  Vitals and nursing note reviewed.   HENT:      Head: Normocephalic and atraumatic.      Mouth/Throat:      Pharynx: Oropharynx is clear. No oropharyngeal exudate or posterior oropharyngeal erythema.   Eyes:      General:         Right eye: No discharge.         Left eye: No discharge.      Extraocular Movements: Extraocular movements intact.      Conjunctiva/sclera:  Conjunctivae normal.      Pupils: Pupils are equal, round, and reactive to light.   Cardiovascular:      Rate and Rhythm: Normal rate and regular rhythm.      Heart sounds: Normal heart sounds. No murmur heard.     No friction rub. No gallop.   Pulmonary:      Effort: Pulmonary effort is normal. No respiratory distress.      Breath sounds: Normal breath sounds. No stridor.   Abdominal:      General: There is no distension.      Palpations: Abdomen is soft. There is no mass.      Tenderness: There is no abdominal tenderness.      Hernia: No hernia is present.   Skin:     Coloration: Skin is not jaundiced.      Findings: No bruising, erythema or rash.   Neurological:      General: No focal deficit present.      Mental Status: She is alert and oriented to person, place, and time. Mental status is at baseline.      Cranial Nerves: No cranial nerve deficit.   Psychiatric:         Behavior: Behavior normal.         Thought Content: Thought content normal.         Judgment: Judgment normal.         Procedures           ED Course  ED Course as of 05/23/25 0053   Fri May 23, 2025   0000 Glucose(!): 160 [RA]   0000 BUN(!): 21 [RA]   0000 Creatinine(!): 1.39 [RA]      ED Course User Index  [RA] Abhilash Garcia MD                                                       Medical Decision Making  Patient presents today with a 1 to 2-day history of feeling generally unwell.  The patient states she recently got an insulin pump a few days ago and has had 2 separate episodes of hypoglycemia today.  Patient had 2 take glucagon and on the second occasion has also had to take additional orange juice before it came above 80.  The patient was brought into the emergency department for further evaluation and management.  The patient states that she is also on the Glucophage and Ozempic (but her chart indicates she is on Farxiga and NOT Glucophage).  The patient has vomited today and felt significantly nauseous as well as having a  headache.    Differential diagnosis for the patient's presentation would include non-compliance, inaccurate insulin pump use, medical device malfunction, overdose, inadequate caloric/carbohydrate intake.  The patient has been diagnosed with poor coloration of the insulin pump to her caloric/carbohydrate intake.    The patient has been given information on hypoglycemia and how to prevent it and how to treat it.  The patient has agreed that she will return to a basal bolus regimen via subcutaneous injections and discontinue the insulin pump at this time.  She has agreed to call the endocrinology clinic in the morning with the hopes that she can get a same-day outpatient appointment.  If she cannot, the patient will continue with a basal bolus regimen over the course of the weekend until she can be seen.  The patient knows that she should return to the emergency department should she have any other complications over the weekend.    Problems Addressed:  Hypoglycemia: complicated acute illness or injury  Mechanical complication, insulin pump, initial encounter: complicated acute illness or injury    Amount and/or Complexity of Data Reviewed  Labs: ordered. Decision-making details documented in ED Course.        Final diagnoses:   Hypoglycemia   Mechanical complication, insulin pump, initial encounter       ED Disposition  ED Disposition       ED Disposition   Discharge    Condition   Stable    Comment   --               Lan Guerra, APRN  [address]  [facility] [address]  [phone]    Schedule an appointment as soon as possible for a visit in 2 days      Endocrinology  Patient will contact Ruth in the endocrinology department to discuss possibly being seen same-day for an outpatient appointment to discuss insulin pump management             Medication List      No changes were made to your prescriptions during this visit.            Abhilash Garcia MD  05/23/25 0054

## 2025-05-23 NOTE — TELEPHONE ENCOUNTER
Patient calling b/c she was seen in the ER last night for her sugar being below 49 and needs helping adjusting her bolus.

## 2025-05-27 ENCOUNTER — OFFICE VISIT (OUTPATIENT)
Dept: ENDOCRINOLOGY | Facility: CLINIC | Age: 50
End: 2025-05-27
Payer: MEDICAID

## 2025-05-27 VITALS
HEART RATE: 105 BPM | OXYGEN SATURATION: 97 % | WEIGHT: 290 LBS | SYSTOLIC BLOOD PRESSURE: 143 MMHG | DIASTOLIC BLOOD PRESSURE: 85 MMHG | BODY MASS INDEX: 46.81 KG/M2

## 2025-05-27 DIAGNOSIS — E13.9 LADA (LATENT AUTOIMMUNE DIABETES IN ADULTS), MANAGED AS TYPE 2: Primary | ICD-10-CM

## 2025-05-27 LAB
EXPIRATION DATE: ABNORMAL
HBA1C MFR BLD: 6.4 % (ref 4.5–5.7)
Lab: ABNORMAL

## 2025-05-27 NOTE — PROGRESS NOTES
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 provider found     HPI   Marquita Vernon is a 50 y.o. 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    ECHO treated as T2DM.    She has noted improving BG's since her last phone call in to the office and adjusting her meal announcements prior to eating meals.  She is noting that she is doing better since then.     Diabetes:  Diabetes was diagnosed 2009.  Complications include neuropathy.  [facility].  Current medications for diabetes include Novolog in an Omnipod insulin pump, Ozempic 2 mg weekly.  Past meds: Metformin-GI issues, Januvia-insurance issue  She checks her blood sugar with Dexcom CGM.  Hypos: occasionally, mostly overnight/early morning    Patient noted to be Latent onset Type 1 diabetic with positive CHEYANNE antibodies.  She has been noting improving BG's recently without hypos.    ACE/ARB:no, Statin: no  Labs:  A1C:8.3 (5/2022)    The following portions of the patient's history were reviewed and updated as appropriate: allergies, current medications, past family history, past medical history, past social history, past surgical history and problem list.    Diet:   Breakfast: doesn't typically eat  Lunch: salad or left overs  Dinner: veggies, meat,   Drinks: diet mtn dew, milk  Snacks: popcorn    Past Medical History:   Diagnosis Date    Abnormal ECG     Arthritis     Asthma     Asthma, extrinsic 1979    Diabetes mellitus     Diabetes mellitus type I 11/2009    GERD (gastroesophageal reflux disease)     Gestational diabetes 12/2002    Hearing aid worn     IBS (irritable bowel syndrome)     Lung nodule 12/24/23    Pancreatitis 2017    Scoliosis     Vitamin D deficiency 2017     Past Surgical History:   Procedure Laterality Date     "CARDIOVASCULAR STRESS TEST  11/27/2018    L. Cardiolite- Unable to walk. EF 69%. Negative.    CHOLECYSTECTOMY      COLONOSCOPY      COLONOSCOPY N/A 9/21/2018    Procedure: COLONOSCOPY CPT CODE: 20653;  Surgeon: Micah Swartz MD;  Location: [facility] OR;  Service: Gastroenterology    COLONOSCOPY N/A 1/30/2024    Procedure: COLONOSCOPY FOR SCREENING;  Surgeon: Rosita Boyd MD;  Location: [facility] OR;  Service: Gastroenterology;  Laterality: N/A;    CYST REMOVAL      ECHO - CONVERTED  11/27/2018    EF 65%. No AS. Mild MR    ENDOSCOPY      ENDOSCOPY N/A 9/21/2018    Procedure: ESOPHAGOGASTRODUODENOSCOPY WITH BIOPSY CPT CODE: 53097;  Surgeon: Micah Swartz MD;  Location: [facility] OR;  Service: Gastroenterology    OTHER SURGICAL HISTORY  01/02/2019    Event monitor- baseline sinus, one 39 beat SVT, no V-tach, no AFib, no pauses    TUBAL ABDOMINAL LIGATION      UPPER GASTROINTESTINAL ENDOSCOPY  2015 2018      Family History   Problem Relation Age of Onset    Heart attack Father         MI in his late 40's    Hypertension Father     No Known Problems Sister     Diabetes Maternal Grandmother     Diabetes Other     Cancer Other     Lung cancer Other     Breast cancer Neg Hx       Social History     Socioeconomic History    Marital status: Single   Tobacco Use    Smoking status: Never     Passive exposure: Current    Smokeless tobacco: Never   Vaping Use    Vaping status: Never Used   Substance and Sexual Activity    Alcohol use: No    Drug use: No    Sexual activity: Not Currently     Partners: Male     Birth control/protection: Post-menopausal, Tubal ligation, Surgical      Allergies   Allergen Reactions    Ibuprofen Hives and Other (See Comments)     \"smiley\"    Latex Hives    Vaseline [Petrolatum] Hives      Current Outpatient Medications on File Prior to Visit   Medication Sig Dispense Refill    cetirizine (zyrTEC) 10 MG tablet       Continuous Glucose  (Dexcom G7 ) device Use 1 " each Take As Directed. 1 each 0    Continuous Glucose Sensor (Dexcom G7 Sensor) misc USE TO MONITOR BLOOD GLUCOSE LEVELS AS DIRECTED. CHANGE SENSOR EVERY 10 DAYS. 3 each 6    cyclobenzaprine (FLEXERIL) 5 MG tablet Take 1 tablet by mouth 3 (Three) Times a Day As Needed for Muscle Spasms.      diclofenac (VOLTAREN) 75 MG EC tablet       gabapentin (NEURONTIN) 600 MG tablet       Glucagon HCl (Glucagon Emergency) 1 MG/ML reconstituted solution Inject 1 mL as directed As Needed (severe hypoglycemia). 1 each 4    glucose (GNP WATERMELON GLUCOSE) 4 GM chewable tablet Chew 1 tablet As Needed for Low Blood Sugar. 50 tablet 5    glucose blood (FREESTYLE LITE) test strip 1 each by Other route 4 (Four) Times a Day. 250 each 2    Insulin Aspart (novoLOG) 100 UNIT/ML injection FOR USE IN INSULIN PUMP. MAX DAILY DOSE  UNITS PER DAY 30 mL 3    lactobacillus acidophilus (RISAQUAD) capsule capsule Take 1 capsule by mouth Daily. 90 capsule 3    Lancets (freestyle) lancets USE 1 LANCET TO CHECK BLOOD GLUCOSE LEVELS FOUR TIMES DAILY 100 each 3    levocetirizine (XYZAL) 5 MG tablet       multivitamin (THERAGRAN) tablet tablet Take 1 tablet by mouth Daily.      omeprazole (priLOSEC) 20 MG capsule Take 2 capsules by mouth 2 (Two) Times a Day. 30 capsule 0    PARoxetine (PAXIL) 40 MG tablet Daily.      Semaglutide, 2 MG/DOSE, (Ozempic, 2 MG/DOSE,) 8 MG/3ML solution pen-injector Inject 2 mg under the skin into the appropriate area as directed 1 (One) Time Per Week. 3 mL 2    vitamin D (ERGOCALCIFEROL) 1.25 MG (29270 UT) capsule capsule Take 1 capsule by mouth Every 7 (Seven) Days. Saturdays       No current facility-administered medications on file prior to visit.        Review of Systems   Constitutional:  Positive for fatigue. Negative for unexpected weight gain and unexpected weight loss.   Eyes: Negative.    Endocrine: Negative for polydipsia, polyphagia and polyuria.   Skin:  Positive for bruise.   Psychiatric/Behavioral:   "Positive for sleep disturbance.    All other systems reviewed and are negative.    /85 (BP Location: Right arm, Patient Position: Sitting, Cuff Size: Large Adult)   Pulse 105   Wt 132 kg (290 lb)   LMP 05/09/2018 (Approximate)   SpO2 97%   BMI 46.81 kg/m²      Physical Exam  Vitals reviewed.   Constitutional:       Appearance: Normal appearance.   Eyes:      Extraocular Movements: Extraocular movements intact.   Cardiovascular:      Rate and Rhythm: Tachycardia present.   Pulmonary:      Effort: Pulmonary effort is normal.   Skin:     General: Skin is warm and dry.   Neurological:      General: No focal deficit present.      Mental Status: She is alert and oriented to person, place, and time.   Psychiatric:         Mood and Affect: Mood normal.         Behavior: Behavior normal.         Thought Content: Thought content normal.         Judgment: Judgment normal.       CMP:  Lab Results   Component Value Date    Glucose 160 (H) 05/22/2025    Glucose 80 05/22/2025    Glucose 164 (H) 08/18/2023    Glucose 733 (C) 08/14/2023    Glucose >=1,000 (A) 08/13/2023    Glucose, UA Negative 10/11/2023    BUN 21 (H) 05/22/2025    BUN/Creatinine Ratio 15.1 05/22/2025    Creatinine 1.39 (H) 05/22/2025    Creatinine 1.40 (H) 03/29/2024    Creatinine, Urine 171.5 12/18/2024    Creatinine, Urine 31.6 04/04/2023    Creatinine, Urine 31.6 04/04/2023    Ketones, UA Negative 10/11/2023    KIT EXPIRATION DATE n/a 09/23/2021    KIT LOT NUMBER n/a 09/23/2021    CO2 22.7 05/22/2025    CO2 Content 5.5 (L) 08/13/2023    Calcium 8.9 05/22/2025    Albumin 3.8 05/22/2025    AST (SGOT) 19 05/22/2025    ALT (SGPT) 11 05/22/2025     Lipid Panel:  No results found for: \"CHOL\", \"TRIG\", \"HDL\", \"VLDL\", \"LDL\"  HbA1c:  Lab Results   Component Value Date    HGBA1C 6.4 (A) 05/27/2025 06/2024: 8.1 (at PCP)  8.2 (10/1/2024)    Glucose:  Lab Results   Component Value Date    POCGLU 80 05/22/2025     Microalbumin:  Lab Results   Component Value Date "    JEFF 10 12/18/2024     TSH:  Lab Results   Component Value Date    TSH 0.357 05/22/2025       Assessment and Plan    Diagnoses and all orders for this visit:    1. 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  -Discussed dietary and exercise guidelines with patient.  -Discussed the importance of yearly eye exams.  -Discussed the importance of checking BG's regularly. Continue using Dexcom CGM.    2 week pump data download is as follows:  Very High: 3.7%  High: 23.8%  In Range: 71.3%  Low: 0.7%  Very Low: 0.5%  Trend shows overall regulated.   -Discussed importance of announcing meals regularly prior to eating.  Discussed importance of administering meal time boluses to prevent overt hypers with rebound hypos.  -Continue Ozempic 2 mg weekly.  Patient has no personal history of pancreatitis, no family history of MEN syndrome or medullary thyroid cancer. Possible side effects including nausea, bloating, other GI upset and rarely pancreatitis were discussed. She was advised to call the office with any symptoms or concerns.   -Discussed Glucagon and appropriate time of use.  -S/S hypoglycemia reviewed with Rule of 15's advised.  -Follow-up in 3 months.    Orders:  -     POC Glycosylated Hemoglobin (Hb A1C)      Return in about 3 months (around 8/27/2025) for A1C, Follow-up appointment. The patient was instructed to contact the clinic with any interval questions or concerns.        This document has been electronically signed by ALIE Gonzalez  May 30, 2025 10:59 EDT   Endocrinology    Please note that portions of this document were completed with a voice recognition program. Efforts were made to edit the dictations, but occasionally words are mis-transcribed.

## 2025-05-29 ENCOUNTER — OFFICE VISIT (OUTPATIENT)
Dept: PULMONOLOGY | Facility: CLINIC | Age: 50
End: 2025-05-29
Payer: MEDICAID

## 2025-05-29 VITALS
WEIGHT: 289.4 LBS | HEART RATE: 114 BPM | HEIGHT: 66 IN | BODY MASS INDEX: 46.51 KG/M2 | OXYGEN SATURATION: 95 % | TEMPERATURE: 97.7 F | DIASTOLIC BLOOD PRESSURE: 70 MMHG | SYSTOLIC BLOOD PRESSURE: 102 MMHG

## 2025-05-29 DIAGNOSIS — E66.01 MORBID OBESITY WITH BMI OF 45.0-49.9, ADULT: ICD-10-CM

## 2025-05-29 DIAGNOSIS — J45.20 MILD INTERMITTENT ASTHMA WITHOUT COMPLICATION: ICD-10-CM

## 2025-05-29 DIAGNOSIS — R91.1 PULMONARY NODULE: ICD-10-CM

## 2025-05-29 DIAGNOSIS — G47.33 OSA (OBSTRUCTIVE SLEEP APNEA): Primary | ICD-10-CM

## 2025-05-29 PROCEDURE — 99214 OFFICE O/P EST MOD 30 MIN: CPT | Performed by: INTERNAL MEDICINE

## 2025-05-29 PROCEDURE — 1160F RVW MEDS BY RX/DR IN RCRD: CPT | Performed by: INTERNAL MEDICINE

## 2025-05-29 PROCEDURE — 1159F MED LIST DOCD IN RCRD: CPT | Performed by: INTERNAL MEDICINE

## 2025-05-29 RX ORDER — ATORVASTATIN CALCIUM 10 MG/1
TABLET, FILM COATED ORAL
COMMUNITY

## 2025-05-29 RX ORDER — ALBUTEROL SULFATE 90 UG/1
2 INHALANT RESPIRATORY (INHALATION) EVERY 4 HOURS PRN
Qty: 18 G | Refills: 5 | Status: SHIPPED | OUTPATIENT
Start: 2025-05-29

## 2025-05-29 RX ORDER — FLUTICASONE PROPIONATE 50 MCG
SPRAY, SUSPENSION (ML) NASAL EVERY 12 HOURS SCHEDULED
COMMUNITY

## 2025-05-29 RX ORDER — HYDROCODONE BITARTRATE AND ACETAMINOPHEN 7.5; 325 MG/1; MG/1
TABLET ORAL EVERY 8 HOURS SCHEDULED
COMMUNITY
Start: 2025-04-29 | End: 2025-05-29

## 2025-05-29 RX ORDER — FLUTICASONE PROPIONATE AND SALMETEROL XINAFOATE 115; 21 UG/1; UG/1
2 AEROSOL, METERED RESPIRATORY (INHALATION)
Qty: 1 EACH | Refills: 5 | Status: SHIPPED | OUTPATIENT
Start: 2025-05-29
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
## 2025-05-30 NOTE — ASSESSMENT & PLAN NOTE
-Diabetes is at goal with A1c 6.4.  -Discussed dietary and exercise guidelines with patient.  -Discussed the importance of yearly eye exams.  -Discussed the importance of checking BG's regularly. Continue using Dexcom CGM.    2 week pump data download is as follows:  Very High: 3.7%  High: 23.8%  In Range: 71.3%  Low: 0.7%  Very Low: 0.5%  Trend shows overall regulated.   -Discussed importance of announcing meals regularly prior to eating.  Discussed importance of administering meal time boluses to prevent overt hypers with rebound hypos.  -Continue Ozempic 2 mg weekly.  Patient has no personal history of pancreatitis, no family history of MEN syndrome or medullary thyroid cancer. Possible side effects including nausea, bloating, other GI upset and rarely pancreatitis were discussed. She was advised to call the office with any symptoms or concerns.   -Discussed Glucagon and appropriate time of use.  -S/S hypoglycemia reviewed with Rule of 15's advised.  -Follow-up in 3 months.

## 2025-06-16 RX ORDER — SEMAGLUTIDE 2.68 MG/ML
2 INJECTION, SOLUTION SUBCUTANEOUS WEEKLY
Qty: 3 ML | Refills: 2 | Status: SHIPPED | OUTPATIENT
Start: 2025-06-16

## 2025-06-16 RX ORDER — BLOOD-GLUCOSE METER
1 KIT MISCELLANEOUS 4 TIMES DAILY
Qty: 250 EACH | Refills: 2 | Status: SHIPPED | OUTPATIENT
Start: 2025-06-16

## 2025-07-07 RX ORDER — ALBUTEROL SULFATE 90 UG/1
2 AEROSOL, METERED RESPIRATORY (INHALATION) EVERY 4 HOURS PRN
Qty: 18 G | Refills: 5 | Status: SHIPPED | OUTPATIENT
Start: 2025-07-07

## 2025-07-16 RX ORDER — SEMAGLUTIDE 2.68 MG/ML
INJECTION, SOLUTION SUBCUTANEOUS
Qty: 3 ML | Refills: 2 | Status: SHIPPED | OUTPATIENT
Start: 2025-07-16

## 2025-08-07 ENCOUNTER — TRANSCRIBE ORDERS (OUTPATIENT)
Dept: ADMINISTRATIVE | Facility: HOSPITAL | Age: 50
End: 2025-08-07
Payer: MEDICAID

## 2025-08-07 DIAGNOSIS — E28.39 OVARIAN FAILURE: Primary | ICD-10-CM

## 2025-08-08 RX ORDER — INSULIN ASPART 100 [IU]/ML
INJECTION, SOLUTION INTRAVENOUS; SUBCUTANEOUS
Qty: 30 ML | Refills: 3 | Status: SHIPPED | OUTPATIENT
Start: 2025-08-08

## 2025-08-08 RX ORDER — GLUCAGON 1 MG
1 KIT INJECTION AS NEEDED
Qty: 1 EACH | Refills: 4 | Status: SHIPPED | OUTPATIENT
Start: 2025-08-08

## 2025-08-15 RX ORDER — INSULIN ASPART 100 [IU]/ML
INJECTION, SOLUTION INTRAVENOUS; SUBCUTANEOUS
Qty: 30 ML | Refills: 3 | Status: SHIPPED | OUTPATIENT
Start: 2025-08-15

## 2025-08-18 ENCOUNTER — TELEMEDICINE (OUTPATIENT)
Dept: ENDOCRINOLOGY | Facility: CLINIC | Age: 50
End: 2025-08-18
Payer: MEDICAID

## 2025-08-18 VITALS — BODY MASS INDEX: 46.45 KG/M2 | WEIGHT: 289 LBS | HEIGHT: 66 IN

## 2025-08-18 DIAGNOSIS — E13.9 LADA (LATENT AUTOIMMUNE DIABETES IN ADULTS), MANAGED AS TYPE 2: Primary | ICD-10-CM

## 2025-08-18 PROCEDURE — 99214 OFFICE O/P EST MOD 30 MIN: CPT | Performed by: NURSE PRACTITIONER

## 2025-08-18 PROCEDURE — 3044F HG A1C LEVEL LT 7.0%: CPT | Performed by: NURSE PRACTITIONER

## 2025-08-18 RX ORDER — LANCETS 28 GAUGE
EACH MISCELLANEOUS
Qty: 100 EACH | Refills: 3 | Status: SHIPPED | OUTPATIENT
Start: 2025-08-18

## (undated) DEVICE — CONN Y IRR DISP 1P/U

## (undated) DEVICE — Device: Brand: ENDOGATOR

## (undated) DEVICE — SUCTION CANISTER, 1500CC, RIGID: Brand: DEROYAL

## (undated) DEVICE — SINGLE PORT MANIFOLD: Brand: NEPTUNE 2

## (undated) DEVICE — Device: Brand: DEFENDO AIR/WATER/SUCTION AND BIOPSY VALVE

## (undated) DEVICE — THE BITE BLOCK MAXI, LATEX FREE STRAP IS USED TO PROTECT THE ENDOSCOPE INSERTION TUBE FROM BEING BITTEN BY THE PATIENT.

## (undated) DEVICE — Device

## (undated) DEVICE — TUBING, SUCTION, 1/4" X 20', STRAIGHT: Brand: MEDLINE INDUSTRIES, INC.

## (undated) DEVICE — SYR LUERLOK 30CC

## (undated) DEVICE — ENDOGATOR AUXILIARY WATER JET CONNECTOR: Brand: ENDOGATOR

## (undated) DEVICE — ENDOCUFF VISION PRP SM 10.4

## (undated) DEVICE — GOWN,REINF,POLY,ECL,PP SLV,XL: Brand: MEDLINE

## (undated) DEVICE — FRCP BX RADJAW4 NDL 2.8 240CM LG OG BX40